# Patient Record
Sex: FEMALE | Race: WHITE | NOT HISPANIC OR LATINO | Employment: OTHER | URBAN - METROPOLITAN AREA
[De-identification: names, ages, dates, MRNs, and addresses within clinical notes are randomized per-mention and may not be internally consistent; named-entity substitution may affect disease eponyms.]

---

## 2017-01-30 ENCOUNTER — OFFICE VISIT (OUTPATIENT)
Dept: NEUROLOGY | Facility: CLINIC | Age: 72
End: 2017-01-30
Payer: MEDICARE

## 2017-01-30 VITALS
RESPIRATION RATE: 18 BRPM | DIASTOLIC BLOOD PRESSURE: 66 MMHG | BODY MASS INDEX: 29.87 KG/M2 | SYSTOLIC BLOOD PRESSURE: 112 MMHG | HEIGHT: 66 IN | WEIGHT: 185.88 LBS | HEART RATE: 76 BPM

## 2017-01-30 DIAGNOSIS — G56.03 CARPAL TUNNEL SYNDROME, BILATERAL: ICD-10-CM

## 2017-01-30 DIAGNOSIS — G24.3 CERVICAL DYSTONIA: Primary | ICD-10-CM

## 2017-01-30 DIAGNOSIS — M48.061 LUMBAR STENOSIS: ICD-10-CM

## 2017-01-30 PROCEDURE — 99999 PR PBB SHADOW E&M-EST. PATIENT-LVL III: CPT | Mod: PBBFAC,,, | Performed by: PSYCHIATRY & NEUROLOGY

## 2017-01-30 PROCEDURE — 99214 OFFICE O/P EST MOD 30 MIN: CPT | Mod: S$PBB,,, | Performed by: PSYCHIATRY & NEUROLOGY

## 2017-01-30 PROCEDURE — 99213 OFFICE O/P EST LOW 20 MIN: CPT | Mod: PBBFAC | Performed by: PSYCHIATRY & NEUROLOGY

## 2017-01-30 NOTE — PROGRESS NOTES
HPI: Evy Mendoza is a 71 y.o. female with history of Bilateral CTS prior s/p remote right CTR as well as history of cervical spinal meningioma removal 4 years ago after presenting with numb hands. Now with pain and numbness in the last 3 digits of the left hands (improved). MRI Cervical spine 2014 showed prior fusion and disc bulging and NF foraminal narrowing. EMG 2014 showed Mild Bilateral CTS, No ulnar neuropathy, reduced CMAP from likely prior cervical myelopathy  Now with increased neck pain and lumbar radicular pain.   NOTE CERVICAL DYSTONIA ON EXAM  Since the last visit with Botox in 11/2016, she had good response with Botox. She had this over 2 months ago. She had more response to the medication than first dose.She has a significant improvement in her pain and symptoms return closer to botox.   She is not having any facial pain.She states she has remote history of TN and had remote brain scan for this.   Currently being treated for URI/sinus infection.   Her lumbar pain depends on her activities.   This responds to rest  CTS symptoms are not very bothersome  Review of Systems   Constitutional: Negative for fever.   Eyes: Negative for double vision.   Respiratory: Negative for hemoptysis.    Cardiovascular: Negative for leg swelling.   Gastrointestinal: Negative for blood in stool.   Genitourinary: Negative for hematuria.   Musculoskeletal: Positive for back pain and neck pain.   Skin: Negative for rash.   Neurological: Negative for sensory change and headaches.   Psychiatric/Behavioral: The patient does not have insomnia.            Exam:  Gen Appearance, well developed/nourished in no apparent distress  CV: 2+ distal pulses with no edema or swelling    Neuro:  MS: Awake, alert, Sustains attention. Recent/remote memory intact, Language is full to spontaneous speech/comprehension. Fund of Knowledge is full  CN: Optic discs are flat with normal vasculature, PERRL, Extraoccular movements and visual  fields are full. Normal facial sensation and strength, Hearing symmetric, Tongue and Palate are midline and strong. Shoulder Shrug symmetric and strong.  Motor: Normal bulk, tone, no abnormal movements. 5/5 strength bilateral upper/lower extremities with 2+ reflexes and no clonus  Sensory: symmetric to  temp, and vibration.  Romberg negative  Cerebellar: Finger-nose intact  Gait: Normal stance, no ataxia  There is clear right more than left levator and trapezius muscle spasm. Neck is right lateral and vasu-flex as prior        EMG 2014: 1. Mild Bilateral Carpal Tunnel Syndrome, worse on the left  2. NO evidence of ulnar neuropathy on this study  3. Reduced CMAP in the arms and not legs suggesting this is from   from prior cervical myelopathy and not polyneuropathy    8/2016 MRI C spine: Posterior decompression with fusion of the C3-C6 levels.  There are moderate changes of the cervical spine, most pronounced at C6-7, without central canal stenosis or neural foraminal narrowing.    No abnormal cord edema or postcontrast enhancement.    8/2016 MRI L spine:   Slight retrolisthesis of L2 on L3 and L3 on L4 by approximately 4 mm.    Multilevel degenerative changes of the lumbar spine contributing to central canal stenosis and neural foraminal narrowing as detailed in the above level by level description.    Assessment/Plan: Evy Mendoza is a 71 y.o. female with history of Bilateral CTS prior s/p remote right CTR as well as history of cervical spinal meningioma removal 4 years ago after presenting with numb hands. Now with pain and numbness in the last 3 digits of the left hands (improved). MRI Cervical spine 2014 showed prior fusion and disc bulging and NF foraminal narrowing. EMG 2014 showed Mild Bilateral CTS, No ulnar neuropathy, reduced CMAP from likely prior cervical myelopathy  Now with increased neck pain and lumbar radicular pain.   NOTE CERVICAL DYSTONIA ON EXAM    I recommend:     1. Patient followed  up with Dr Block in 2014 and was discharged as she opted against spine surgery.   2. MRI  C spine 8/2016: no meningioma/ some degenerative changes without spinal stenosis. MRI L spine 8/2016: Moderate central canal stenosis. No further imaging needed at this time.   3.  Botox for cervical dystonia is starting to help: 200 units/ continue q 3 months for now/ watch for continued improvement.   4.  She is not interested in further treatment of Lumbar or cervical disc disease at this time.   5. CTS symptoms are not very active currently  6. PCP managing Trigeminal Neuralgia (she states she had brain imaging for this with Dr Joy years ago.) Tegretol helps  -- will monitor. This did not improve with Botox      RTC for Botox and continue q 3 months

## 2017-01-30 NOTE — MR AVS SNAPSHOT
[facility]  [address] [facility] [address]  Phone: [phone]  Fax: [phone]                  Evy Mendoza   2017 2:15 PM   Office Visit    Description:  Female : 1945   Provider:  Terry Clinton MD   Department:  [facility]           Reason for Visit     Neurologic Problem           Diagnoses this Visit        Comments    Cervical dystonia    -  Primary     Lumbar stenosis         Carpal tunnel syndrome, bilateral                To Do List           Goals (5 Years of Data)     None      Follow-Up and Disposition     Return for botox .      [facility]     [facility] -  Assistance  Registered nurses in the [facility] provide clinical advisement, health education, appointment booking, and other advisory services.  Call for this free service at [phone].             Medications           Message regarding Medications     Verify the changes and/or additions to your medication regime listed below are the same as discussed with your clinician today.  If any of these changes or additions are incorrect, please notify your healthcare provider.        These medications were administered today        Dose Freq    onabotulinumtoxina injection 200 Units 200 Units Once    Sig: Inject 200 Units into the muscle once.    Class: Normal    Route: Intramuscular      STOP taking these medications     back brace Misc 1 Device by Misc.(Non-Drug; Combo Route) route daily as needed.           Verify that the below list of medications is an accurate representation of the medications you are currently taking.  If none reported, the list may be blank. If incorrect, please contact your healthcare provider. Carry this list with you in case of emergency.           Current Medications     ACETAMINOPHEN (TYLENOL 8 HOUR ORAL) Take 1 tablet by mouth daily as needed.    alendronate (FOSAMAX) 70 MG tablet Take 1 tablet (70  "mg total) by mouth every 7 days.    calcium-vitamin D3 (CALCIUM 500 + D) 500 mg(1,250mg) -200 unit per tablet Take 1 tablet by mouth 2 (two) times daily with meals.    carbamazepine (TEGRETOL) 200 mg tablet Take 200 mg by mouth 2 (two) times daily.     cetirizine (ZYRTEC) 10 MG tablet Take 1 tablet by mouth continuous prn.     cyanocobalamin 500 MCG tablet Take 500 mcg by mouth once daily.    docusate sodium (COLACE) 100 MG capsule Take 100 mg by mouth once daily.    estrogen, conjugated,-medroxyprogesterone 0.3-1.5 mg (PREMPRO) 0.3-1.5 mg per tablet Take 1 tablet by mouth once daily.    fish oil-omega-3 fatty acids 300-1,000 mg capsule Take 1 g by mouth 3 (three) times daily.    fluticasone (FLONASE) 50 mcg/actuation nasal spray 1 spray by Nasal route continuous prn.     glucosamine-chondroitin 500-400 mg tablet Take 1 tablet by mouth 3 (three) times daily.    losartan-hydrochlorothiazide 50-12.5 mg (HYZAAR) 50-12.5 mg per tablet Take 1 tablet by mouth once daily.    metoprolol succinate (TOPROL-XL) 25 MG 24 hr tablet Take 25 mg by mouth once daily.    multivitamin (ONE DAILY MULTIVITAMIN) per tablet Take 1 tablet by mouth once daily.    olopatadine (PATADAY) 0.2 % Drop Place 1 drop into both eyes every evening.    pentosan polysulfate (ELMIRON) 100 mg capsule Take 1 tablet by mouth 3 (three) times daily.     rabeprazole (ACIPHEX) 20 mg tablet Take 20 mg by mouth once daily.    tramadol 100 mg TM24 Take 100 mg by mouth 2 (two) times daily as needed.     VYTORIN 10-20 10-20 mg per tablet Take 1 tablet by mouth once daily.           Clinical Reference Information           Vital Signs - Last Recorded  Most recent update: 1/30/2017  2:29 PM by Geno Cordova MA    BP Pulse Resp Ht Wt BMI    112/66 (BP Location: Right arm, Patient Position: Sitting, BP Method: Manual) 76 18 5' 6" (1.676 m) 84.3 kg (185 lb 13.6 oz) 30 kg/m2      Blood Pressure          Most Recent Value    BP  112/66      Allergies as of 1/30/2017  "    No Known Drug Allergies      Immunizations Administered on Date of Encounter - 1/30/2017     None      Orders Placed During Today's Visit      Normal Orders This Visit    Prior Authorization Order

## 2017-02-10 ENCOUNTER — PROCEDURE VISIT (OUTPATIENT)
Dept: NEUROLOGY | Facility: CLINIC | Age: 72
End: 2017-02-10
Payer: MEDICARE

## 2017-02-10 DIAGNOSIS — G24.3 CERVICAL DYSTONIA: ICD-10-CM

## 2017-02-10 PROCEDURE — 64616 CHEMODENERV MUSC NECK DYSTON: CPT | Mod: 50,S$PBB,, | Performed by: PSYCHIATRY & NEUROLOGY

## 2017-02-10 PROCEDURE — 64616 CHEMODENERV MUSC NECK DYSTON: CPT | Mod: PBBFAC | Performed by: PSYCHIATRY & NEUROLOGY

## 2017-02-10 RX ADMIN — ONABOTULINUMTOXINA 200 UNITS: 100 INJECTION, POWDER, LYOPHILIZED, FOR SOLUTION INTRADERMAL; INTRAMUSCULAR at 03:02

## 2017-02-10 NOTE — PROCEDURES
Procedures   BOTOX PROCEDURE NOTE     Date of Procedure: 2/10/17    Reason for Proceedure: Cervical dystonia      Informed consent was obtained prior to performing this study. Two patient identifiers were confirmed with the patient prior to performing this study. A time out to determine correct patient and and agreement on procedure performed was conducted prior to the injections.     Procedure Details: After informed consent obtained the patient's head and upper neck was cleansed with alcohol rub and 200 Units of Botox (diluted 1:1) was injected in the following bilateral muscles:   40 units in each Trapezius*  20 units in each Levator Scapulae*  10 units in each Splenius Capitus*  10 units in each Longissimus*  10 units in each Spenius Cervices*  10 units in each Semispinalis Capitus*    *= denotes bilateral injections    The patient tolerated the procedure well with less than 0.25cc of blood loss. She was observed for several minutes post injection and given a handout from [employer] regarding when and where to seek help if side effects are experienced.  RTC in 12 weeks are more for more Botox

## 2017-05-05 ENCOUNTER — PROCEDURE VISIT (OUTPATIENT)
Dept: NEUROLOGY | Facility: CLINIC | Age: 72
End: 2017-05-05
Payer: MEDICARE

## 2017-05-05 DIAGNOSIS — G24.3 CERVICAL DYSTONIA: Primary | ICD-10-CM

## 2017-05-05 PROCEDURE — 64616 CHEMODENERV MUSC NECK DYSTON: CPT | Mod: 50,S$PBB | Performed by: PSYCHIATRY & NEUROLOGY

## 2017-05-05 RX ORDER — LIDOCAINE AND PRILOCAINE 25; 25 MG/G; MG/G
CREAM TOPICAL 2 TIMES DAILY PRN
Qty: 30 G | Refills: 3 | Status: SHIPPED | OUTPATIENT
Start: 2017-05-05 | End: 2018-10-05

## 2017-05-05 RX ADMIN — ONABOTULINUMTOXINA 200 UNITS: 100 INJECTION, POWDER, LYOPHILIZED, FOR SOLUTION INTRADERMAL; INTRAMUSCULAR at 02:05

## 2017-05-05 NOTE — PROCEDURES
Procedures   BOTOX PROCEDURE NOTE     Date of Procedure:5/5/17    Reason for Proceedure: Cervical dystonia       Informed consent was obtained prior to performing this study. Two patient identifiers were confirmed with the patient prior to performing this study. A time out to determine correct patient and and agreement on procedure performed was conducted prior to the injections.     Procedure Details: After informed consent obtained the patient's head and upper neck was cleansed with alcohol rub and 200 Units of Botox (diluted 1:1) was injected in the following bilateral muscles:   40 units in each Trapezius*  20 units in each Levator Scapulae*  10 units in each Splenius Capitus*  10 units in each Longissimus*  10 units in each Spenius Cervices*  10 units in each Semispinalis Capitus*    *= denotes bilateral injections    The patient tolerated the procedure well with less than 0.25cc of blood loss. She was observed for several minutes post injection and given a handout from [employer] regarding when and where to seek help if side effects are experienced.  RTC in 12 weeks are more for more Botox as this helps her neck pain greatly for at least 10 weeks  Discussed if she is not improving more after the next injection, can consider increased Botox dose. She is not interested at this time.   She has relief with lidocaine for back pain: apply lidocaine/prilocaine cream to the back (quater size amount) bid PRN as prescribed unless rash

## 2017-05-30 ENCOUNTER — OFFICE VISIT (OUTPATIENT)
Dept: NEUROLOGY | Facility: CLINIC | Age: 72
End: 2017-05-30
Payer: MEDICARE

## 2017-05-30 VITALS
HEART RATE: 74 BPM | HEIGHT: 66 IN | DIASTOLIC BLOOD PRESSURE: 88 MMHG | RESPIRATION RATE: 14 BRPM | WEIGHT: 186.94 LBS | BODY MASS INDEX: 30.04 KG/M2 | SYSTOLIC BLOOD PRESSURE: 120 MMHG

## 2017-05-30 DIAGNOSIS — M48.061 LUMBAR STENOSIS: ICD-10-CM

## 2017-05-30 DIAGNOSIS — M54.12 CERVICAL RADICULAR PAIN: ICD-10-CM

## 2017-05-30 DIAGNOSIS — M54.81 OCCIPITAL NEURALGIA OF LEFT SIDE: ICD-10-CM

## 2017-05-30 DIAGNOSIS — G24.3 CERVICAL DYSTONIA: Primary | ICD-10-CM

## 2017-05-30 PROCEDURE — 1159F MED LIST DOCD IN RCRD: CPT | Performed by: PSYCHIATRY & NEUROLOGY

## 2017-05-30 PROCEDURE — 99214 OFFICE O/P EST MOD 30 MIN: CPT | Mod: S$PBB | Performed by: PSYCHIATRY & NEUROLOGY

## 2017-05-30 PROCEDURE — 99999 PR PBB SHADOW E&M-EST. PATIENT-LVL IV: CPT | Mod: PBBFAC,,, | Performed by: PSYCHIATRY & NEUROLOGY

## 2017-05-30 PROCEDURE — 1125F AMNT PAIN NOTED PAIN PRSNT: CPT | Performed by: PSYCHIATRY & NEUROLOGY

## 2017-05-30 PROCEDURE — 99214 OFFICE O/P EST MOD 30 MIN: CPT | Mod: PBBFAC | Performed by: PSYCHIATRY & NEUROLOGY

## 2017-05-30 NOTE — PROGRESS NOTES
"HPI:Evy Menodza is a 72 y.o. female with history of Bilateral CTS prior s/p remote right CTR as well as history of cervical spinal meningioma removal 4 years ago after presenting with numb hands. Now with pain and numbness in the last 3 digits of the left hands (improved). MRI Cervical spine 2014 showed prior fusion and disc bulging and NF foraminal narrowing. EMG 2014 showed Mild Bilateral CTS, No ulnar neuropathy, reduced CMAP from likely prior cervical myelopathy  Now with increased neck pain and lumbar radicular pain.   NOTE CERVICAL DYSTONIA ON EXAM    Patient is here today to discuss her long standing symptoms previously  believed to be Trigeminal Neuralgia. For the past 10 years she has felt "a different feeling in the left head" and a crawling feeling in the left ear. This has responded to Tegretol for years. Lately, she has had more pressure in the head. This occurs in the left head in a waylon's like distribution from the back of the head at the occiput to the frontal region. There is no reproduction of the pain with chewing or touching. There was never and shocking pain and there was constant strange sensation.   She states she has bad knees which causes her to sway to one side.  Her last Botox treatment did not seem to help as well as the others.   She is pending consultation with Dr ADAM Liang to discuss intervention    Review of Systems   Constitutional: Negative for fever.   Eyes: Negative for double vision.   Respiratory: Negative for hemoptysis.    Cardiovascular: Negative for leg swelling.   Gastrointestinal: Negative for blood in stool.   Genitourinary: Negative for hematuria.   Musculoskeletal: Positive for back pain and neck pain.   Skin: Negative for rash.   Neurological: Negative for sensory change and headaches.   Psychiatric/Behavioral: The patient does not have insomnia.            Exam:  Gen Appearance, well developed/nourished in no apparent distress  CV: 2+ distal pulses with no " edema or swelling    Neuro:  MS: Awake, alert, Sustains attention. Recent/remote memory intact, Language is full to spontaneous speech/comprehension. Fund of Knowledge is full  CN: Optic discs are flat with normal vasculature, PERRL, Extraoccular movements and visual fields are full. Normal facial sensation and strength, Hearing symmetric, Tongue and Palate are midline and strong. Shoulder Shrug symmetric and strong.  Motor: Normal bulk, tone, no abnormal movements. 5/5 strength bilateral upper/lower extremities with 2+ reflexes and no clonus  Sensory: symmetric to  temp, and vibration.  Romberg negative  Cerebellar: Finger-nose intact  Gait: Normal stance, no ataxia  There is clear right more than left levator and trapezius muscle spasm. Neck is right lateral and vasu-flex as prior which is improved but not resolved post botox  Some pain at the left occiput on palpation      EMG 2014: 1. Mild Bilateral Carpal Tunnel Syndrome, worse on the left  2. NO evidence of ulnar neuropathy on this study  3. Reduced CMAP in the arms and not legs suggesting this is from   from prior cervical myelopathy and not polyneuropathy    8/2016 MRI C spine: Posterior decompression with fusion of the C3-C6 levels.  There are moderate changes of the cervical spine, most pronounced at C6-7, without central canal stenosis or neural foraminal narrowing.    No abnormal cord edema or postcontrast enhancement.    8/2016 MRI L spine:   Slight retrolisthesis of L2 on L3 and L3 on L4 by approximately 4 mm.    Multilevel degenerative changes of the lumbar spine contributing to central canal stenosis and neural foraminal narrowing as detailed in the above level by level description.    Assessment/Plan: Evy Mendoza is a 72 y.o. female with history of Bilateral CTS prior s/p remote right CTR as well as history of cervical spinal meningioma removal 4 years ago after presenting with numb hands. Now with pain and numbness in the last 3 digits  "of the left hands (improved). MRI Cervical spine 2014 showed prior fusion and disc bulging and NF foraminal narrowing. EMG 2014 showed Mild Bilateral CTS, No ulnar neuropathy, reduced CMAP from likely prior cervical myelopathy  Now with increased neck pain and lumbar radicular pain.   NOTE CERVICAL DYSTONIA ON EXAM    I recommend:     1. Patient followed up with Dr Block in 2014 and was discharged as she opted against spine surgery.   2. MRI  C spine 8/2016: no meningioma/ some degenerative changes without spinal stenosis. MRI L spine 8/2016: Moderate central canal stenosis. No further imaging needed at this time.   3.  Botox for cervical dystonia is starting to help but did not help as well at last injection: Increase to 300 units/ continue q 3 months for now/ watch for continued improvement.   4.  She will see Dr ADAM Liang about  Lumbar or cervical disc disease at this time- to discuss intervention  5. CTS symptoms are not very active currently  6. PCP managing "Trigeminal Neuralgia" (she states she had brain imaging for this with Dr Joy years ago.) Tegretol was helping. This, upon further review sounds like left occipital neuralgia.  Refer to pain management to consider ON blocks- she is pending appointment with Dr ADAM Liang regarding her spinal pain and will review with him. Consider tapering Tegretol if this helps.       RTC for Botox     CC: Dr ADAM Liang      "

## 2017-08-04 ENCOUNTER — PROCEDURE VISIT (OUTPATIENT)
Dept: NEUROLOGY | Facility: CLINIC | Age: 72
End: 2017-08-04
Payer: MEDICARE

## 2017-08-04 DIAGNOSIS — G24.3 CERVICAL DYSTONIA: ICD-10-CM

## 2017-08-04 PROCEDURE — 99999 PR STA SHADOW: CPT | Mod: PBBFAC,,, | Performed by: PSYCHIATRY & NEUROLOGY

## 2017-08-04 PROCEDURE — 64616 CHEMODENERV MUSC NECK DYSTON: CPT | Mod: 50,S$PBB | Performed by: PSYCHIATRY & NEUROLOGY

## 2017-08-04 RX ADMIN — ONABOTULINUMTOXINA 300 UNITS: 100 INJECTION, POWDER, LYOPHILIZED, FOR SOLUTION INTRADERMAL; INTRAMUSCULAR at 01:08

## 2017-08-04 NOTE — PROCEDURES
Procedures     BOTOX PROCEDURE NOTE     Date of Procedure:8/4/17    Reason for Proceedure: Cervical dystonia       Informed consent was obtained prior to performing this study. Two patient identifiers were confirmed with the patient prior to performing this study. A time out to determine correct patient and and agreement on procedure performed was conducted prior to the injections.     Procedure Details: After informed consent obtained the patient's head and upper neck was cleansed with alcohol rub and 300 Units of Botox (diluted 1:1) was injected in the following bilateral muscles:   50 units in each Trapezius*  40 units in each Levator Scapulae*  20 units in each Splenius Capitus*  20 units in each Longissimus*  10 units in each Spenius Cervices*  10 units in each Semispinalis Capitus*    *= denotes bilateral injections    The patient tolerated the procedure well with less than 0.25cc of blood loss. She was observed for several minutes post injection and given a handout from [employer] regarding when and where to seek help if side effects are experienced.  RTC in 3 months for more Botox. She will notify me if any changes in symptoms which would prompt different or even less frequent treatment prior.

## 2017-08-08 ENCOUNTER — TELEPHONE (OUTPATIENT)
Dept: OBSTETRICS AND GYNECOLOGY | Facility: CLINIC | Age: 72
End: 2017-08-08

## 2017-08-08 DIAGNOSIS — Z12.31 ENCOUNTER FOR SCREENING MAMMOGRAM FOR BREAST CANCER: Primary | ICD-10-CM

## 2017-08-08 NOTE — TELEPHONE ENCOUNTER
----- Message from Yodit Solis MA sent at 8/8/2017  9:27 AM CDT -----  Contact: self  Evy Mendoza  MRN: [id number]  Home Phone      [phone]  Work Phone      Not on file.  Mobile          Not on file.    Patient Care Team:  Rakesh Devine MD as PCP - General (Internal Medicine)  OB? No  What phone number can you be reached at?   [phone]  Message:  Please link mammo orders to appt 10/03/17.  Thanks!

## 2017-09-06 DIAGNOSIS — M81.0 OSTEOPOROSIS: ICD-10-CM

## 2017-09-06 RX ORDER — ALENDRONATE SODIUM 70 MG/1
TABLET ORAL
Qty: 12 TABLET | Refills: 0 | Status: SHIPPED | OUTPATIENT
Start: 2017-09-06 | End: 2017-10-03 | Stop reason: SDUPTHER

## 2017-09-06 NOTE — TELEPHONE ENCOUNTER
Evy desire refill of Fosamax. Annual exam scheduled.   Patient Active Problem List   Diagnosis    Osteoporosis     Prior to Admission medications    Medication Sig Start Date End Date Taking? Authorizing Provider   ACETAMINOPHEN (TYLENOL 8 HOUR ORAL) Take 1 tablet by mouth daily as needed.    Historical Provider, MD   alendronate (FOSAMAX) 70 MG tablet Take 1 tablet (70 mg total) by mouth every 7 days. 9/28/16   Marisa Mcdowell MD   calcium-vitamin D3 (CALCIUM 500 + D) 500 mg(1,250mg) -200 unit per tablet Take 1 tablet by mouth once daily at 6am.     Historical Provider, MD   carbamazepine (TEGRETOL) 200 mg tablet Take 200 mg by mouth 2 (two) times daily.     Historical Provider, MD   cetirizine (ZYRTEC) 10 MG tablet Take 1 tablet by mouth continuous prn.  12/15/11   Historical Provider, MD   cyanocobalamin 500 MCG tablet Take 500 mcg by mouth once daily.    Historical Provider, MD   docusate sodium (COLACE) 100 MG capsule Take 100 mg by mouth once daily.    Historical Provider, MD   estrogen, conjugated,-medroxyprogesterone 0.3-1.5 mg (PREMPRO) 0.3-1.5 mg per tablet Take 1 tablet by mouth once daily. 9/28/16 5/30/17  Marisa Mcdowell MD   fish oil-omega-3 fatty acids 300-1,000 mg capsule Take 1 g by mouth once daily.     Historical Provider, MD   fluticasone (FLONASE) 50 mcg/actuation nasal spray 1 spray by Nasal route continuous prn.  12/15/11   Historical Provider, MD   glucosamine-chondroitin 500-400 mg tablet Take 1 tablet by mouth once daily at 6am.     Historical Provider, MD   lidocaine-prilocaine (EMLA) cream Apply topically 2 (two) times daily as needed. Apply to painful areas of skin 5/5/17   Terry Clinton MD   losartan-hydrochlorothiazide 50-12.5 mg (HYZAAR) 50-12.5 mg per tablet Take 1 tablet by mouth once daily. 7/10/15   Historical Provider, MD   metoprolol succinate (TOPROL-XL) 25 MG 24 hr tablet Take 25 mg by mouth once daily.    Historical Provider, MD   multivitamin (ONE  DAILY MULTIVITAMIN) per tablet Take 1 tablet by mouth once daily.    Historical Provider, MD   olopatadine (PATADAY) 0.2 % Drop Place 1 drop into both eyes every evening.    Historical Provider, MD   pentosan polysulfate (ELMIRON) 100 mg capsule Take 1 tablet by mouth 3 (three) times daily.  12/15/11   Historical Provider, MD   rabeprazole (ACIPHEX) 20 mg tablet Take 20 mg by mouth once daily. 9/8/15   Historical Provider, MD   tramadol 100 mg TM24 Take 100 mg by mouth 2 (two) times daily as needed.     Historical Provider, MD   VYTORIN 10-20 10-20 mg per tablet Take 1 tablet by mouth once daily. 9/8/15   Historical Provider, MD

## 2017-10-03 ENCOUNTER — OFFICE VISIT (OUTPATIENT)
Dept: OBSTETRICS AND GYNECOLOGY | Facility: CLINIC | Age: 72
End: 2017-10-03
Payer: MEDICARE

## 2017-10-03 ENCOUNTER — HOSPITAL ENCOUNTER (OUTPATIENT)
Dept: RADIOLOGY | Facility: HOSPITAL | Age: 72
Discharge: HOME | End: 2017-10-03
Attending: OBSTETRICS & GYNECOLOGY
Payer: MEDICARE

## 2017-10-03 VITALS
HEART RATE: 88 BPM | WEIGHT: 186 LBS | HEIGHT: 66 IN | SYSTOLIC BLOOD PRESSURE: 122 MMHG | DIASTOLIC BLOOD PRESSURE: 84 MMHG | BODY MASS INDEX: 29.89 KG/M2 | RESPIRATION RATE: 10 BRPM | HEIGHT: 66 IN | BODY MASS INDEX: 29.89 KG/M2 | WEIGHT: 186 LBS

## 2017-10-03 DIAGNOSIS — Z78.0 MENOPAUSE: ICD-10-CM

## 2017-10-03 DIAGNOSIS — Z12.31 ENCOUNTER FOR SCREENING MAMMOGRAM FOR BREAST CANCER: ICD-10-CM

## 2017-10-03 DIAGNOSIS — Z01.419 WELL WOMAN EXAM WITH ROUTINE GYNECOLOGICAL EXAM: Primary | ICD-10-CM

## 2017-10-03 DIAGNOSIS — M81.0 AGE-RELATED OSTEOPOROSIS WITHOUT CURRENT PATHOLOGICAL FRACTURE: ICD-10-CM

## 2017-10-03 DIAGNOSIS — N81.11 CYSTOCELE, MIDLINE: ICD-10-CM

## 2017-10-03 DIAGNOSIS — Z79.890 HORMONE REPLACEMENT THERAPY (HRT): ICD-10-CM

## 2017-10-03 PROCEDURE — 77063 BREAST TOMOSYNTHESIS BI: CPT | Mod: 26,,, | Performed by: RADIOLOGY

## 2017-10-03 PROCEDURE — 99999 PR STA SHADOW: CPT | Mod: PBBFAC,,, | Performed by: OBSTETRICS & GYNECOLOGY

## 2017-10-03 PROCEDURE — 77067 SCR MAMMO BI INCL CAD: CPT | Mod: 26,,, | Performed by: RADIOLOGY

## 2017-10-03 PROCEDURE — 99999 PR PBB SHADOW E&M-EST. PATIENT-LVL IV: CPT | Mod: PBBFAC,,, | Performed by: OBSTETRICS & GYNECOLOGY

## 2017-10-03 PROCEDURE — 77067 SCR MAMMO BI INCL CAD: CPT | Mod: TC

## 2017-10-03 PROCEDURE — G0101 CA SCREEN;PELVIC/BREAST EXAM: HCPCS | Mod: S$PBB | Performed by: OBSTETRICS & GYNECOLOGY

## 2017-10-03 PROCEDURE — 99214 OFFICE O/P EST MOD 30 MIN: CPT | Mod: PBBFAC,25 | Performed by: OBSTETRICS & GYNECOLOGY

## 2017-10-03 RX ORDER — TRAMADOL HYDROCHLORIDE 50 MG/1
50 TABLET ORAL 2 TIMES DAILY
Refills: 1 | COMMUNITY
Start: 2017-08-01 | End: 2018-04-09 | Stop reason: SDUPTHER

## 2017-10-03 RX ORDER — ALENDRONATE SODIUM 70 MG/1
70 TABLET ORAL
Qty: 12 TABLET | Refills: 3 | Status: SHIPPED | OUTPATIENT
Start: 2017-10-03 | End: 2018-10-05 | Stop reason: SDUPTHER

## 2017-10-03 RX ORDER — CONJUGATED ESTROGENS AND MEDROXYPROGESTERONE ACETATE .3; 1.5 MG/1; MG/1
1 TABLET, SUGAR COATED ORAL DAILY
Qty: 90 TABLET | Refills: 3 | Status: SHIPPED | OUTPATIENT
Start: 2017-10-03 | End: 2018-09-01 | Stop reason: SDUPTHER

## 2017-10-03 RX ORDER — METOPROLOL SUCCINATE 50 MG/1
25 TABLET, EXTENDED RELEASE ORAL DAILY
Refills: 5 | COMMUNITY
Start: 2017-07-31 | End: 2017-11-09

## 2017-10-03 RX ORDER — CONJUGATED ESTROGENS AND MEDROXYPROGESTERONE ACETATE .3; 1.5 MG/1; MG/1
1 TABLET, SUGAR COATED ORAL DAILY
Refills: 3 | COMMUNITY
Start: 2017-07-31 | End: 2017-10-03 | Stop reason: SDUPTHER

## 2017-10-03 RX ORDER — TRIAMCINOLONE ACETONIDE 1 MG/G
CREAM TOPICAL
Refills: 0 | COMMUNITY
Start: 2017-06-26 | End: 2017-10-03

## 2017-10-03 NOTE — PROGRESS NOTES
Subjective:    Patient ID: Evy Mendoza is a 72 y.o. y.o. female.     Chief Complaint: Annual Well Woman Exam     History of Present Illness:  Evy presents today for Annual Well Woman exam. She describes her menses as absent in menopause.She denies pelvic pain.  She denies breast tenderness, masses, nipple discharge. She denies difficulty with urination or bowel movements. She denies menopausal symptoms such as hotflashes, vaginal dryness, and night sweats. She denies bloating, early satiety, or weight changes. She is sexually active. Contraception is by no method.    Patient reports she has noticed recently lower abdominal pressure, low back pain, and vaginal bulge. She is concerned about prolapse.       Menstrual History:   No LMP recorded. Patient is postmenopausal..     OB History      Para Term  AB Living    2 2 2     2    SAB TAB Ectopic Multiple Live Births            2          The following portions of the patient's history were reviewed and updated as appropriate: allergies, current medications, past family history, past medical history, past social history, past surgical history and problem list.    ROS:   CONSTITUTIONAL: Negative for fever, chills, diaphoresis, weakness, fatigue, weight loss, weight gain  ENT: nasal congestion, nasal discharge  EYES: negative for blurry vision, decreased vision, loss of vision, eye pain, diplopia, photophobia, discharge  SKIN: Negative for rash, itching, hives  RESPIRATORY: negative for cough, hemoptysis, shortness of breath, pleuritic chest pain, wheezing  CARDIOVASCULAR: negative for chest pain, dyspnea on exertion, orthopnea, paroxysmal nocturnal dyspnea, edema, palpitations  BREAST: negative for breast  tenderness, breast mass, nipple discharge, or skin changes  GASTROINTESTINAL: constipation  GENITOURINARY: negative for abnormal vaginal bleeding, amenorrhea, decreased libido, dysuria, genital sores, hematuria, incontinence, menorrhagia,  pelvic pain, urinary frequency, vaginal discharge  HEMATOLOGIC/LYMPHATIC: negative for swollen lymph nodes, bleeding, bruising  MUSCULOSKELETAL: back pain, joint pain, muscle pain  NEUROLOGICAL: negative for dizzy/vertigo, headache, focal weakness, numbness/tingling, speech problems, loss of consciousness, confusion, memory loss  BEHAVORIAL/PSYCH: negative for anxiety, depression, psychosis  ENDOCRINE: negative for polydipsia/polyuria, palpitations, skin changes, temperature intolerance, unexpected weight changes  ALLERGIC/IMMUNOLOGIC: negative for urticaria, hay fever, angioedema      Objective:    Vital Signs:  Vitals:    10/03/17 1431   BP: 122/84   Pulse: 88   Resp: 10       Physical Exam:  General:  alert,normal appearing gravid female   Skin:  Skin color, texture, turgor normal. No rashes or lesions   HEENT:  conjunctivae/corneas clear. PERRL.   Neck: supple, trachea midline, no adenopathy or thyromegally   Respiratory:  clear to auscultation bilaterally   Heart:  regular rate and rhythm, S1, S2 normal, no murmur, click, rub or gallop   Breasts:  no discharge, erythema, or tenderness   Abdomen:  soft, non-tender; bowel sounds normal   Pelvis: External genitalia: normal general appearance  Urinary system: urethral meatus normal, bladder nontender  Vaginal: atrophic mucosa, cystocele present, stage 3  Cervix: normal appearance  Uterus: normal size, shape, position  Adnexa: normal size, nontender bilaterally   Extremities: Normal ROM; no edema, no cyanosis   Neurologial: Normal strength and tone. No focal numbness or weakness. Reflexes 2+ and equal.   Psychiatric: normal mood, speech, dress, and thought processes         Assessment:       Healthy female exam.     1. Well woman exam with routine gynecological exam    2. Age-related osteoporosis without current pathological fracture    3. Hormone replacement therapy (HRT)    4. Menopause    5. Cystocele, midline          Plan:      MMG preformed today; follow  up  DEXA preformed last year; revealed osteopenia with an elevated FRAX score  Colonoscopy up to date  Discussed weight bearing exercise, calcium, and vitamin d for osteoporosis prevention  Refill on Prempro  Refill on fosamax  Discussed cystocele findings and treatment with pessary; will schedule  RTC in 1 year    COUNSELING:  Evy was counseled on STD pevention, use and side-effects of various contraceptive measures, A.C.O.G. Pap guidelines and recommendations for yearly pelvic exams in addition to recommendations for monthly self breast exams; to see her PCP for other health maintenance.

## 2017-11-03 ENCOUNTER — PROCEDURE VISIT (OUTPATIENT)
Dept: NEUROLOGY | Facility: CLINIC | Age: 72
End: 2017-11-03
Payer: MEDICARE

## 2017-11-03 DIAGNOSIS — G24.3 CERVICAL DYSTONIA: Primary | ICD-10-CM

## 2017-11-03 PROCEDURE — 99999 PR STA SHADOW: CPT | Mod: PBBFAC,,, | Performed by: PSYCHIATRY & NEUROLOGY

## 2017-11-03 PROCEDURE — 64616 CHEMODENERV MUSC NECK DYSTON: CPT | Mod: 50,S$PBB | Performed by: PSYCHIATRY & NEUROLOGY

## 2017-11-03 RX ADMIN — ONABOTULINUMTOXINA 300 UNITS: 100 INJECTION, POWDER, LYOPHILIZED, FOR SOLUTION INTRADERMAL; INTRAMUSCULAR at 01:11

## 2017-11-03 NOTE — PROGRESS NOTES
BOTOX PROCEDURE NOTE     Date of Procedure:1/3/17    Reason for Proceedure: Cervical dystonia       Informed consent was obtained prior to performing this study. Two patient identifiers were confirmed with the patient prior to performing this study. A time out to determine correct patient and and agreement on procedure performed was conducted prior to the injections.     Procedure Details: After informed consent obtained the patient's head and upper neck was cleansed with alcohol rub and 300 Units of Botox (diluted 1:1) was injected in the following bilateral muscles:   50 units in each Trapezius*  40 units in each Levator Scapulae*  20 units in each Splenius Capitus*  20 units in each Longissimus*  10 units in each Spenius Cervices*  10 units in each Semispinalis Capitus*    *= denotes bilateral injections    The patient tolerated the procedure well with less than 0.25cc of blood loss. She was observed for several minutes post injection and given a handout from [employer] regarding when and where to seek help if side effects are experienced.  RTC in 3 months for more Botox. She will notify me if any changes in symptoms which would prompt different or even less frequent treatment prior.

## 2017-11-03 NOTE — PROCEDURES
BOTOX PROCEDURE NOTE     Date of Procedure:1/3/17    Reason for Proceedure: Cervical dystonia       Informed consent was obtained prior to performing this study. Two patient identifiers were confirmed with the patient prior to performing this study. A time out to determine correct patient and and agreement on procedure performed was conducted prior to the injections.     Procedure Details: After informed consent obtained the patient's head and upper neck was cleansed with alcohol rub and 300 Units of Botox (diluted 1:1) was injected in the following bilateral muscles:   50 units in each Trapezius*  40 units in each Levator Scapulae*  20 units in each Splenius Capitus*  20 units in each Longissimus*  10 units in each Spenius Cervices*  10 units in each Semispinalis Capitus*    *= denotes bilateral injections    The patient tolerated the procedure well with less than 0.25cc of blood loss. She was observed for several minutes post injection and given a handout from [employer] regarding when and where to seek help if side effects are experienced.  RTC in 3 months for more Botox as this greatly helps her symptoms of neck pain and dystonia until 3 months after last injection when pain recurs. She also hd good relief after lumbar injection with Dr ADAM Liang (she has follow up with him)

## 2017-11-09 ENCOUNTER — OFFICE VISIT (OUTPATIENT)
Dept: OBSTETRICS AND GYNECOLOGY | Facility: CLINIC | Age: 72
End: 2017-11-09
Payer: MEDICARE

## 2017-11-09 VITALS
BODY MASS INDEX: 30.05 KG/M2 | RESPIRATION RATE: 17 BRPM | DIASTOLIC BLOOD PRESSURE: 80 MMHG | HEIGHT: 66 IN | HEART RATE: 76 BPM | WEIGHT: 187 LBS | SYSTOLIC BLOOD PRESSURE: 110 MMHG

## 2017-11-09 DIAGNOSIS — Z46.89 ENCOUNTER FOR FITTING AND ADJUSTMENT OF PESSARY: Primary | ICD-10-CM

## 2017-11-09 DIAGNOSIS — N81.11 CYSTOCELE, MIDLINE: ICD-10-CM

## 2017-11-09 PROCEDURE — 99213 OFFICE O/P EST LOW 20 MIN: CPT | Mod: PBBFAC | Performed by: OBSTETRICS & GYNECOLOGY

## 2017-11-09 PROCEDURE — 99999 PR PBB SHADOW E&M-EST. PATIENT-LVL III: CPT | Mod: PBBFAC,,, | Performed by: OBSTETRICS & GYNECOLOGY

## 2017-11-09 PROCEDURE — 99999 PR STA SHADOW: CPT | Mod: PBBFAC,,, | Performed by: OBSTETRICS & GYNECOLOGY

## 2017-11-09 PROCEDURE — 99213 OFFICE O/P EST LOW 20 MIN: CPT | Mod: S$PBB | Performed by: OBSTETRICS & GYNECOLOGY

## 2017-11-09 NOTE — PROGRESS NOTES
Subjective:    Patient ID: Evy Mendoza is a 72 y.o. y.o. female.     Chief Complaint:   Chief Complaint   Patient presents with    Pessary Check     pessary fitting        History of Present Illness:   Evy presents for a pessary fitting. She was noted to have a stage 3 prolapse at her last visit. Patient reports she has noticed recently lower abdominal pressure, low back pain, and vaginal bulge.     MEDICATION LIST    Current Outpatient Prescriptions:     ACETAMINOPHEN (TYLENOL 8 HOUR ORAL), Take 1 tablet by mouth daily as needed., Disp: , Rfl:     alendronate (FOSAMAX) 70 MG tablet, Take 1 tablet (70 mg total) by mouth every 7 days., Disp: 12 tablet, Rfl: 3    calcium-vitamin D3 (CALCIUM 500 + D) 500 mg(1,250mg) -200 unit per tablet, Take 1 tablet by mouth once daily at 6am. , Disp: , Rfl:     carbamazepine (TEGRETOL) 200 mg tablet, Take 200 mg by mouth 2 (two) times daily. , Disp: , Rfl:     cetirizine (ZYRTEC) 10 MG tablet, Take 1 tablet by mouth continuous prn. , Disp: , Rfl:     cyanocobalamin 500 MCG tablet, Take 500 mcg by mouth once daily., Disp: , Rfl:     docusate sodium (COLACE) 100 MG capsule, Take 100 mg by mouth once daily., Disp: , Rfl:     fish oil-omega-3 fatty acids 300-1,000 mg capsule, Take 1 g by mouth once daily. , Disp: , Rfl:     fluticasone (FLONASE) 50 mcg/actuation nasal spray, 1 spray by Nasal route continuous prn. , Disp: , Rfl:     glucosamine-chondroitin 500-400 mg tablet, Take 1 tablet by mouth once daily at 6am. , Disp: , Rfl:     losartan-hydrochlorothiazide 50-12.5 mg (HYZAAR) 50-12.5 mg per tablet, Take 1 tablet by mouth once daily., Disp: , Rfl: 3    metoprolol succinate (TOPROL-XL) 25 MG 24 hr tablet, Take 25 mg by mouth once daily., Disp: , Rfl:     multivitamin (ONE DAILY MULTIVITAMIN) per tablet, Take 1 tablet by mouth once daily., Disp: , Rfl:     olopatadine (PATADAY) 0.2 % Drop, Place 1 drop into both eyes every evening., Disp: , Rfl:      pentosan polysulfate (ELMIRON) 100 mg capsule, Take 1 tablet by mouth 3 (three) times daily. , Disp: , Rfl:     PREMPRO 0.3-1.5 mg per tablet, Take 1 tablet by mouth once daily., Disp: 90 tablet, Rfl: 3    rabeprazole (ACIPHEX) 20 mg tablet, Take 20 mg by mouth once daily., Disp: , Rfl: 6    tramadol (ULTRAM) 50 mg tablet, Take 50 mg by mouth 2 (two) times daily., Disp: , Rfl: 1    VYTORIN 10-20 10-20 mg per tablet, Take 1 tablet by mouth once daily., Disp: , Rfl: 0    lidocaine-prilocaine (EMLA) cream, Apply topically 2 (two) times daily as needed. Apply to painful areas of skin, Disp: 30 g, Rfl: 3    PRE-PESSARY CHECK COUNSELING:  The patient was informed of the risks of pain or discomfort, continuous incontinence, urinary retention with insertion of a pessary and malodorous discharge and/or possible bleeding from granulation tissue after wearing the pessary for sometime. She was counseled on the alternatives to pessary insertion, including surgery or no treatment with continued symptoms, and she agrees to proceed.    PROCEDURE:  TIME OUT PERFORMED.  A number 4 ring with no support was inserted without difficulty. Patient was able to void without difficulty.     The patient was able to sit, stand, walk and either cough or urinate on the toilet after the procedure normally without expelling the pessary. The patient tolerated the procedure well.      ASSESSMENT:  Pelvic relaxation managed with pessary. 618.9.    POST PESSARY CHECK COUNSELING:  Report worsening urinary incontinency, urinary retention, pain, fever, foul smelling discharge or bleeding.  Importance of keeping follow-up appointments for a pessary check stressed.    Counseling lasted approximately 10 minutes and all her questions were answered.    FOLLOW-UP: In 3 months for pessary check.

## 2017-11-13 ENCOUNTER — OFFICE VISIT (OUTPATIENT)
Dept: OBSTETRICS AND GYNECOLOGY | Facility: CLINIC | Age: 72
End: 2017-11-13
Payer: MEDICARE

## 2017-11-13 VITALS
SYSTOLIC BLOOD PRESSURE: 120 MMHG | HEART RATE: 77 BPM | HEIGHT: 66 IN | WEIGHT: 185 LBS | DIASTOLIC BLOOD PRESSURE: 80 MMHG | RESPIRATION RATE: 13 BRPM | BODY MASS INDEX: 29.73 KG/M2

## 2017-11-13 DIAGNOSIS — N81.11 CYSTOCELE, MIDLINE: ICD-10-CM

## 2017-11-13 DIAGNOSIS — Z46.89 PESSARY MAINTENANCE: Primary | ICD-10-CM

## 2017-11-13 PROCEDURE — 99213 OFFICE O/P EST LOW 20 MIN: CPT | Mod: S$PBB | Performed by: OBSTETRICS & GYNECOLOGY

## 2017-11-13 PROCEDURE — 99999 PR PBB SHADOW E&M-EST. PATIENT-LVL III: CPT | Mod: PBBFAC,,, | Performed by: OBSTETRICS & GYNECOLOGY

## 2017-11-13 PROCEDURE — 99213 OFFICE O/P EST LOW 20 MIN: CPT | Mod: PBBFAC | Performed by: OBSTETRICS & GYNECOLOGY

## 2017-11-13 PROCEDURE — 99999 PR STA SHADOW: CPT | Mod: PBBFAC,,, | Performed by: OBSTETRICS & GYNECOLOGY

## 2017-11-13 RX ORDER — GUAIFENESIN AND CODEINE PHOSPHATE 100; 10 MG/5ML; MG/5ML
SYRUP ORAL
Refills: 0 | COMMUNITY
Start: 2017-11-12 | End: 2018-10-05

## 2017-11-13 RX ORDER — ALBUTEROL SULFATE 0.83 MG/ML
SOLUTION RESPIRATORY (INHALATION)
Refills: 0 | COMMUNITY
Start: 2017-11-12 | End: 2018-10-05

## 2017-11-13 RX ORDER — AZITHROMYCIN 250 MG/1
TABLET, FILM COATED ORAL
Refills: 0 | COMMUNITY
Start: 2017-11-12 | End: 2018-05-08

## 2017-11-14 NOTE — PROGRESS NOTES
Subjective:    Patient ID: Evy Mendoza is a 72 y.o. y.o. female.     Chief Complaint:   Chief Complaint   Patient presents with    Pessary Check       History of Present Illness:   Evy presents for a pessary check. She is unsure if it fell out when she had a bowel movement the day after it was placed.  She states she felt it slip prior to the BM but had pushed it back into place.  When she strained, she was unsure if it came out or not and could not tell due to blue urine from her medication usage.  She has no complaints of bleeding or pain. Has been able to void normally.     MEDICATION LIST    Current Outpatient Prescriptions:     ACETAMINOPHEN (TYLENOL 8 HOUR ORAL), Take 1 tablet by mouth daily as needed., Disp: , Rfl:     albuterol (PROVENTIL) 2.5 mg /3 mL (0.083 %) nebulizer solution, inhale contents of 1 vial in nebulizer four times a day for 10 days if needed for cough, Disp: , Rfl: 0    alendronate (FOSAMAX) 70 MG tablet, Take 1 tablet (70 mg total) by mouth every 7 days., Disp: 12 tablet, Rfl: 3    azithromycin (Z-GLADYS) 250 MG tablet, take 2 tablets by mouth today then take 1 tablet DAILY FOR 4 DAYS, Disp: , Rfl: 0    calcium-vitamin D3 (CALCIUM 500 + D) 500 mg(1,250mg) -200 unit per tablet, Take 1 tablet by mouth once daily at 6am. , Disp: , Rfl:     carbamazepine (TEGRETOL) 200 mg tablet, Take 200 mg by mouth 2 (two) times daily. , Disp: , Rfl:     cetirizine (ZYRTEC) 10 MG tablet, Take 1 tablet by mouth continuous prn. , Disp: , Rfl:     cyanocobalamin 500 MCG tablet, Take 500 mcg by mouth once daily., Disp: , Rfl:     docusate sodium (COLACE) 100 MG capsule, Take 100 mg by mouth once daily., Disp: , Rfl:     fish oil-omega-3 fatty acids 300-1,000 mg capsule, Take 1 g by mouth once daily. , Disp: , Rfl:     fluticasone (FLONASE) 50 mcg/actuation nasal spray, 1 spray by Nasal route continuous prn. , Disp: , Rfl:     glucosamine-chondroitin 500-400 mg tablet, Take 1 tablet by  mouth once daily at 6am. , Disp: , Rfl:     GUAIFENESIN AC  mg/5 mL liquid, TAKE 1 TEASPOONFUL BY MOUTH 4 TIMES DAILY AS NEEDED FOR COUGH, Disp: , Rfl: 0    lidocaine-prilocaine (EMLA) cream, Apply topically 2 (two) times daily as needed. Apply to painful areas of skin, Disp: 30 g, Rfl: 3    losartan-hydrochlorothiazide 50-12.5 mg (HYZAAR) 50-12.5 mg per tablet, Take 1 tablet by mouth once daily., Disp: , Rfl: 3    metoprolol succinate (TOPROL-XL) 25 MG 24 hr tablet, Take 25 mg by mouth once daily., Disp: , Rfl:     multivitamin (ONE DAILY MULTIVITAMIN) per tablet, Take 1 tablet by mouth once daily., Disp: , Rfl:     olopatadine (PATADAY) 0.2 % Drop, Place 1 drop into both eyes every evening., Disp: , Rfl:     pentosan polysulfate (ELMIRON) 100 mg capsule, Take 1 tablet by mouth 3 (three) times daily. , Disp: , Rfl:     PREMPRO 0.3-1.5 mg per tablet, Take 1 tablet by mouth once daily., Disp: 90 tablet, Rfl: 3    rabeprazole (ACIPHEX) 20 mg tablet, Take 20 mg by mouth once daily., Disp: , Rfl: 6    tramadol (ULTRAM) 50 mg tablet, Take 50 mg by mouth 2 (two) times daily., Disp: , Rfl: 1    VYTORIN 10-20 10-20 mg per tablet, Take 1 tablet by mouth once daily., Disp: , Rfl: 0    PRE-PESSARY CHECK COUNSELING:  The patient was informed of the risks of pain or discomfort, continuous incontinence, urinary retention with insertion of a pessary and malodorous discharge and/or possible bleeding from granulation tissue after wearing the pessary for sometime. She was counseled on the alternatives to pessary insertion, including surgery or no treatment with continued symptoms, and she agrees to proceed.    PROCEDURE:  TIME OUT PERFORMED.  Pessary noted to be in place.    The pessary was removed and cleaned with soap and water.  There was no granulation tissue, erythema, or ulceration present. There was no discharge present.  The pessary was re-inserted.  The patient was able to sit, stand, walk,cough and urinate  on the toilet after the procedure normally without expelling the pessary. Educated patient on removal and placement.  Pt attempted this but was unsuccessful; will continue to practice.  Re-examined patient to ensure pessary fitting properly. The patient tolerated the procedure well.    TRIMOSAN vaginal cream prescribed to be continued; pt already has this.         ASSESSMENT:    1. Pessary maintenance    2. Cystocele, midline        POST PESSARY CHECK COUNSELING:  Report worsening urinary incontinency, urinary retention, pain, fever, foul smelling discharge or bleeding.  Importance of keeping follow-up appointments for a pessary check stressed.    Counseling lasted approximately 10 minutes and all her questions were answered.    FOLLOW-UP: In 3 months for pessary check or sooner if she is unable to remove pessary or to place pessary back if she can remove it.

## 2017-12-08 ENCOUNTER — TELEPHONE (OUTPATIENT)
Dept: NEUROLOGY | Facility: CLINIC | Age: 72
End: 2017-12-08

## 2017-12-08 NOTE — TELEPHONE ENCOUNTER
Spoke with patient she states that she has this funny feeling all on one side of her head just like when she started with her Trigeminal Neuralgia. She said its like her head is split in half and its only on the left side, she didn't know if her Botox had anything to do with that. She has been on Tegretol 200 mg BID for sometime now.

## 2017-12-08 NOTE — TELEPHONE ENCOUNTER
Patient notified, voiced understanding. She will increase her Tegretol to TID and call us next week if not improved.

## 2017-12-08 NOTE — TELEPHONE ENCOUNTER
Since she has a long history of neuraglia, I think it is more likely related to that. If it is not resolved in the next week or 2 she can come in to adjust meds. If it is very painful, she can raise tegretol to TID now if not too sleepy. We can also perform brain imaging if not better soon.

## 2017-12-08 NOTE — TELEPHONE ENCOUNTER
----- Message from Ayde Dela Cruz sent at 2017  9:01 AM CST -----  Contact: self  Evy Mendoza  MRN: [id number]  : 1945  PCP: Rakesh Devine  Home Phone      [phone]  Work Phone      Not on file.  Mobile          [phone]      MESSAGE: the patient states she is having head pain on one side of her head and is concerned since she does not come back until February for another Botox injection.  Please advise.  Phone:[phone]

## 2018-02-01 ENCOUNTER — PROCEDURE VISIT (OUTPATIENT)
Dept: NEUROLOGY | Facility: CLINIC | Age: 73
End: 2018-02-01
Payer: MEDICARE

## 2018-02-01 DIAGNOSIS — G24.3 CERVICAL DYSTONIA: Primary | ICD-10-CM

## 2018-02-01 PROCEDURE — 99999 PR STA SHADOW: CPT | Mod: PBBFAC,,, | Performed by: PSYCHIATRY & NEUROLOGY

## 2018-02-01 PROCEDURE — 64616 CHEMODENERV MUSC NECK DYSTON: CPT | Mod: 50,PBBFAC | Performed by: PSYCHIATRY & NEUROLOGY

## 2018-02-01 RX ADMIN — ONABOTULINUMTOXINA 300 UNITS: 100 INJECTION, POWDER, LYOPHILIZED, FOR SOLUTION INTRADERMAL; INTRAMUSCULAR at 01:02

## 2018-02-01 NOTE — PROCEDURES
Procedures     BOTOX PROCEDURE NOTE     Date of Procedure:2/1/18    Reason for Proceedure: Cervical dystonia       Informed consent was obtained prior to performing this study. Two patient identifiers were confirmed with the patient prior to performing this study. A time out to determine correct patient and and agreement on procedure performed was conducted prior to the injections.     Procedure Details: After informed consent obtained the patient's head and upper neck was cleansed with alcohol rub and 300 Units of Botox (diluted 1:1) was injected in the following bilateral muscles:   50 units in each Trapezius*  40 units in each Levator Scapulae*  20 units in each Splenius Capitus*  20 units in each Longissimus*  10 units in each Spenius Cervices*  10 units in each Semispinalis Capitus*    *= denotes bilateral injections    The patient tolerated the procedure well with less than 0.25cc of blood loss. She was observed for several minutes post injection and given a handout from [employer] regarding when and where to seek help if side effects are experienced.  RTC in 3 months for more Botox as this greatly helps her symptoms of neck pain and dystonia until 3 months after injection when pain recurs. She follows with Dr ADAM Liang for lumbar injection PRN  Trigeminal neuralgia pain was further evaluated by PCP who recommended she avoid sleeping in certain positions that seemed to cause her pain- this is greatly improved now  An MRI C spine was ordered by PCP as it was thought her face pain was cervical radicular- she was told there was no change- will request report for review

## 2018-05-01 NOTE — TELEPHONE ENCOUNTER
----- Message from Ayde Dela Cruz sent at 2018 11:00 AM CDT -----  Contact: self  Evy Mendoza  MRN: [id number]  : 1945  PCP: Rakesh Devine  Home Phone      [phone]  Work Phone      Not on file.  Mobile          [phone]      MESSAGE: The patient states she had a pessary placed and was given a cream to use. She is not sure where she needs to get the cream since she is almost out of it.  Phone:[phone]  Pharmacy:[facility] in [place]

## 2018-05-01 NOTE — TELEPHONE ENCOUNTER
Rx authorized. Please inquire if patient still is wearing pessary.  If so, she will need an appointment for a pessary check at which time she would be given the cream.

## 2018-05-01 NOTE — TELEPHONE ENCOUNTER
[facility] Last annual 10/17  Patient Active Problem List   Diagnosis    Osteoporosis     Prior to Admission medications    Medication Sig Start Date End Date Taking? Authorizing Provider   ACETAMINOPHEN (TYLENOL 8 HOUR ORAL) Take 1 tablet by mouth daily as needed.    Historical Provider, MD   albuterol (PROVENTIL) 2.5 mg /3 mL (0.083 %) nebulizer solution inhale contents of 1 vial in nebulizer four times a day for 10 days if needed for cough 11/12/17   Historical Provider, MD   alendronate (FOSAMAX) 70 MG tablet Take 1 tablet (70 mg total) by mouth every 7 days. 10/3/17   Marisa Mcdowell MD   azithromycin (Z-GLADYS) 250 MG tablet take 2 tablets by mouth today then take 1 tablet DAILY FOR 4 DAYS 11/12/17   Historical Provider, MD   calcium-vitamin D3 (CALCIUM 500 + D) 500 mg(1,250mg) -200 unit per tablet Take 1 tablet by mouth once daily at 6am.     Historical Provider, MD   carbamazepine (TEGRETOL) 200 mg tablet Take 200 mg by mouth 2 (two) times daily.     Historical Provider, MD   cetirizine (ZYRTEC) 10 MG tablet Take 1 tablet by mouth continuous prn.  12/15/11   Historical Provider, MD   cyanocobalamin 500 MCG tablet Take 500 mcg by mouth once daily.    Historical Provider, MD   docusate sodium (COLACE) 100 MG capsule Take 100 mg by mouth once daily.    Historical Provider, MD   fish oil-omega-3 fatty acids 300-1,000 mg capsule Take 1 g by mouth once daily.     Historical Provider, MD   fluticasone (FLONASE) 50 mcg/actuation nasal spray 1 spray by Nasal route continuous prn.  12/15/11   Historical Provider, MD   glucosamine-chondroitin 500-400 mg tablet Take 1 tablet by mouth once daily at 6am.     Historical Provider, MD   GUAIFENESIN AC  mg/5 mL liquid TAKE 1 TEASPOONFUL BY MOUTH 4 TIMES DAILY AS NEEDED FOR COUGH 11/12/17   Historical Provider, MD   lidocaine-prilocaine (EMLA) cream Apply topically 2 (two) times daily as needed. Apply to painful areas of skin 5/5/17    Terry Clinton MD   losartan-hydrochlorothiazide 50-12.5 mg (HYZAAR) 50-12.5 mg per tablet Take 1 tablet by mouth once daily. 7/10/15   Historical Provider, MD   metoprolol succinate (TOPROL-XL) 25 MG 24 hr tablet Take 25 mg by mouth once daily.    Historical Provider, MD   multivitamin (ONE DAILY MULTIVITAMIN) per tablet Take 1 tablet by mouth once daily.    Historical Provider, MD   olopatadine (PATADAY) 0.2 % Drop Place 1 drop into both eyes every evening.    Historical Provider, MD   pentosan polysulfate (ELMIRON) 100 mg capsule Take 1 tablet by mouth 3 (three) times daily.  12/15/11   Historical Provider, MD   PREMPRO 0.3-1.5 mg per tablet Take 1 tablet by mouth once daily. 10/3/17   Marisa Mcdowell MD   rabeprazole (ACIPHEX) 20 mg tablet Take 20 mg by mouth once daily. 9/8/15   Historical Provider, MD   traMADol (ULTRAM) 50 mg tablet TAKE ONE TABLET BY MOUTH TWICE DAILY 4/10/18   Rakesh Devine MD   VYTORIN 10-20 10-20 mg per tablet Take 1 tablet by mouth once daily. 9/8/15   Historical Provider, MD

## 2018-05-03 ENCOUNTER — PROCEDURE VISIT (OUTPATIENT)
Dept: NEUROLOGY | Facility: CLINIC | Age: 73
End: 2018-05-03
Payer: MEDICARE

## 2018-05-03 DIAGNOSIS — G24.3 CERVICAL DYSTONIA: Primary | ICD-10-CM

## 2018-05-03 PROCEDURE — 99999 PR STA SHADOW: CPT | Mod: PBBFAC,,, | Performed by: PSYCHIATRY & NEUROLOGY

## 2018-05-03 PROCEDURE — 64616 CHEMODENERV MUSC NECK DYSTON: CPT | Mod: 50,PBBFAC | Performed by: PSYCHIATRY & NEUROLOGY

## 2018-05-03 RX ADMIN — ONABOTULINUMTOXINA 300 UNITS: 100 INJECTION, POWDER, LYOPHILIZED, FOR SOLUTION INTRADERMAL; INTRAMUSCULAR at 10:05

## 2018-05-03 NOTE — PROCEDURES
Procedures     BOTOX PROCEDURE NOTE     Date of Procedure:5/3/18    Reason for Proceedure: Cervical dystonia       Informed consent was obtained prior to performing this study. Two patient identifiers were confirmed with the patient prior to performing this study. A time out to determine correct patient and and agreement on procedure performed was conducted prior to the injections.     Procedure Details: After informed consent obtained the patient's head and upper neck was cleansed with alcohol rub and 300 Units of Botox (diluted 1:1) was injected in the following bilateral muscles:   50 units in each Trapezius*  40 units in each Levator Scapulae*  20 units in each Splenius Capitus*  20 units in each Longissimus*  10 units in each Spenius Cervices*  10 units in each Semispinalis Capitus*    *= denotes bilateral injections    The patient tolerated the procedure well with less than 0.25cc of blood loss. She was observed for several minutes post injection and given a handout from [employer] regarding when and where to seek help if side effects are experienced.  RTC in 3 months for more Botox as this greatly helps her symptoms of neck pain and dystonia until 3 months after injection when pain recurs. She follows with Dr ADAM Liang for lumbar injection PRN  Trigeminal neuralgia pain improved as prior  MRI C spine report from requested from last visit shows mild spinal stenosis.

## 2018-05-08 ENCOUNTER — OFFICE VISIT (OUTPATIENT)
Dept: OBSTETRICS AND GYNECOLOGY | Facility: CLINIC | Age: 73
End: 2018-05-08
Payer: MEDICARE

## 2018-05-08 VITALS
HEART RATE: 80 BPM | DIASTOLIC BLOOD PRESSURE: 79 MMHG | SYSTOLIC BLOOD PRESSURE: 123 MMHG | BODY MASS INDEX: 30.7 KG/M2 | WEIGHT: 191 LBS | RESPIRATION RATE: 17 BRPM | HEIGHT: 66 IN

## 2018-05-08 DIAGNOSIS — N81.11 MIDLINE CYSTOCELE: ICD-10-CM

## 2018-05-08 DIAGNOSIS — Z46.89 ENCOUNTER FOR PESSARY MAINTENANCE: Primary | ICD-10-CM

## 2018-05-08 PROCEDURE — 99999 PR PBB SHADOW E&M-EST. PATIENT-LVL III: CPT | Mod: PBBFAC,,, | Performed by: OBSTETRICS & GYNECOLOGY

## 2018-05-08 PROCEDURE — 99213 OFFICE O/P EST LOW 20 MIN: CPT | Mod: S$PBB | Performed by: OBSTETRICS & GYNECOLOGY

## 2018-05-08 PROCEDURE — 99213 OFFICE O/P EST LOW 20 MIN: CPT | Mod: PBBFAC | Performed by: OBSTETRICS & GYNECOLOGY

## 2018-05-08 PROCEDURE — 99999 PR STA SHADOW: CPT | Mod: PBBFAC,,, | Performed by: OBSTETRICS & GYNECOLOGY

## 2018-05-08 NOTE — PROGRESS NOTES
Subjective:    Patient ID: Evy Mendoza is a 73 y.o. y.o. female.     Chief Complaint:   Chief Complaint   Patient presents with    Pessary Check       History of Present Illness:   Evy presents for a pessary check. She has been doing well wearing the pessary and has no complaints of bleeding or pain. She removes it and replaces it herself.  States she occasionally has some difficulty due to hand strength but is able to clean it regularly.      MEDICATION LIST    Current Outpatient Prescriptions:     ACETAMINOPHEN (TYLENOL 8 HOUR ORAL), Take 1 tablet by mouth daily as needed., Disp: , Rfl:     albuterol (PROVENTIL) 2.5 mg /3 mL (0.083 %) nebulizer solution, inhale contents of 1 vial in nebulizer four times a day for 10 days if needed for cough, Disp: , Rfl: 0    alendronate (FOSAMAX) 70 MG tablet, Take 1 tablet (70 mg total) by mouth every 7 days., Disp: 12 tablet, Rfl: 3    calcium-vitamin D3 (CALCIUM 500 + D) 500 mg(1,250mg) -200 unit per tablet, Take 1 tablet by mouth once daily at 6am. , Disp: , Rfl:     carbamazepine (TEGRETOL) 200 mg tablet, Take 200 mg by mouth 2 (two) times daily. , Disp: , Rfl:     cetirizine (ZYRTEC) 10 MG tablet, Take 1 tablet by mouth continuous prn. , Disp: , Rfl:     cyanocobalamin 500 MCG tablet, Take 500 mcg by mouth once daily., Disp: , Rfl:     docusate sodium (COLACE) 100 MG capsule, Take 100 mg by mouth once daily., Disp: , Rfl:     fish oil-omega-3 fatty acids 300-1,000 mg capsule, Take 1 g by mouth once daily. , Disp: , Rfl:     fluticasone (FLONASE) 50 mcg/actuation nasal spray, 1 spray by Nasal route continuous prn. , Disp: , Rfl:     glucosamine-chondroitin 500-400 mg tablet, Take 1 tablet by mouth once daily at 6am. , Disp: , Rfl:     GUAIFENESIN AC  mg/5 mL liquid, TAKE 1 TEASPOONFUL BY MOUTH 4 TIMES DAILY AS NEEDED FOR COUGH, Disp: , Rfl: 0    lidocaine-prilocaine (EMLA) cream, Apply topically 2 (two) times daily as needed. Apply to  painful areas of skin, Disp: 30 g, Rfl: 3    losartan-hydrochlorothiazide 50-12.5 mg (HYZAAR) 50-12.5 mg per tablet, Take 1 tablet by mouth once daily., Disp: , Rfl: 3    metoprolol succinate (TOPROL-XL) 25 MG 24 hr tablet, Take 25 mg by mouth once daily., Disp: , Rfl:     multivitamin (ONE DAILY MULTIVITAMIN) per tablet, Take 1 tablet by mouth once daily., Disp: , Rfl:     olopatadine (PATADAY) 0.2 % Drop, Place 1 drop into both eyes every evening., Disp: , Rfl:     oxyquinoline-sod.lauryl sulfat (TRIMO-GRIGGS JELLY) 0.025-0.01 % Gel, Insert vaginally nightly three times per week., Disp: 113.4 g, Rfl: 2    pentosan polysulfate (ELMIRON) 100 mg capsule, Take 1 tablet by mouth 3 (three) times daily. , Disp: , Rfl:     PREMPRO 0.3-1.5 mg per tablet, Take 1 tablet by mouth once daily., Disp: 90 tablet, Rfl: 3    rabeprazole (ACIPHEX) 20 mg tablet, Take 20 mg by mouth once daily., Disp: , Rfl: 6    traMADol (ULTRAM) 50 mg tablet, TAKE ONE TABLET BY MOUTH TWICE DAILY, Disp: 180 tablet, Rfl: 0    VYTORIN 10-20 10-20 mg per tablet, Take 1 tablet by mouth once daily., Disp: , Rfl: 0    PRE-PESSARY CHECK COUNSELING:  The patient was informed of the risks of pain or discomfort, continuous incontinence, urinary retention with insertion of a pessary and malodorous discharge and/or possible bleeding from granulation tissue after wearing the pessary for sometime. She was counseled on the alternatives to pessary insertion, including surgery or no treatment with continued symptoms, and she agrees to proceed.    PROCEDURE:  TIME OUT PERFORMED.  The pessary was removed and cleaned with soap and water.  There was no granulation tissue, erythema, or ulceration present.   There was a slight discharge present.  The pessary was re-inserted.  The patient was able to sit, stand, walk and cough after the procedure normally without expelling the pessary. The patient tolerated the procedure well.    TRIMOSAN vaginal cream prescribed          ASSESSMENT:    1. Encounter for pessary maintenance    2. Midline cystocele        POST PESSARY CHECK COUNSELING:  Report worsening urinary incontinency, urinary retention, pain, fever, foul smelling discharge or bleeding.  Importance of keeping follow-up appointments for a pessary check stressed.    Counseling lasted approximately 10 minutes and all her questions were answered.    FOLLOW-UP: In  10/18 for annual and for pessary check.

## 2018-05-22 PROBLEM — I10 ESSENTIAL (PRIMARY) HYPERTENSION: Status: ACTIVE | Noted: 2018-05-22

## 2018-05-22 PROBLEM — G50.0 TRIGEMINAL NEURALGIA: Status: ACTIVE | Noted: 2018-05-22

## 2018-05-22 PROBLEM — E78.00 HYPERCHOLESTEREMIA: Status: ACTIVE | Noted: 2018-05-22

## 2018-05-22 PROBLEM — M81.0 AGE-RELATED OSTEOPOROSIS WITHOUT CURRENT PATHOLOGICAL FRACTURE: Status: ACTIVE | Noted: 2018-05-22

## 2018-05-22 PROBLEM — K59.04 CHRONIC IDIOPATHIC CONSTIPATION: Status: ACTIVE | Noted: 2018-05-22

## 2018-05-22 PROBLEM — Z78.0 MENOPAUSE: Status: ACTIVE | Noted: 2018-05-22

## 2018-05-30 PROBLEM — M17.10 KNEE ARTHROPATHY: Status: ACTIVE | Noted: 2018-05-30

## 2018-07-26 ENCOUNTER — PROCEDURE VISIT (OUTPATIENT)
Dept: NEUROLOGY | Facility: CLINIC | Age: 73
End: 2018-07-26
Payer: MEDICARE

## 2018-07-26 DIAGNOSIS — G24.3 CERVICAL DYSTONIA: Primary | ICD-10-CM

## 2018-07-26 PROCEDURE — 64616 CHEMODENERV MUSC NECK DYSTON: CPT | Mod: 50,S$PBB | Performed by: PSYCHIATRY & NEUROLOGY

## 2018-07-26 PROCEDURE — 99999 PR STA SHADOW: CPT | Mod: PBBFAC,,, | Performed by: PSYCHIATRY & NEUROLOGY

## 2018-07-26 RX ADMIN — ONABOTULINUMTOXINA 300 UNITS: 100 INJECTION, POWDER, LYOPHILIZED, FOR SOLUTION INTRADERMAL; INTRAMUSCULAR at 01:07

## 2018-07-26 NOTE — PROCEDURES
Procedures      BOTOX PROCEDURE NOTE     Date of Procedure:7/26/18    Reason for Proceedure: Cervical dystonia       Informed consent was obtained prior to performing this study. Two patient identifiers were confirmed with the patient prior to performing this study. A time out to determine correct patient and and agreement on procedure performed was conducted prior to the injections.     Procedure Details: After informed consent obtained the patient's head and upper neck was cleansed with alcohol rub and 300 Units of Botox (diluted 1:1) was injected in the following bilateral muscles:   50 units in each Trapezius*  40 units in each Levator Scapulae*  20 units in each Splenius Capitus*  20 units in each Longissimus*  10 units in each Spenius Cervices*  10 units in each Semispinalis Capitus*    *= denotes bilateral injections    The patient tolerated the procedure well with less than 0.25cc of blood loss. She was observed for several minutes post injection and given a handout from UpToDate regarding when and where to seek help if side effects are experienced.  RTC in 3 months for more Botox as this greatly helps her symptoms of neck pain and dystonia until 3 months after injection when pain recurs.   She follows with Dr ADAM Liang for lumbar injection PRN- has not returned recently as she is using tramadol PRN with PCP instead  Trigeminal neuralgia pain improved as prior  MRI C spine 1/2018 showed mild spinal stenosis per report

## 2018-09-01 RX ORDER — CONJUGATED ESTROGENS AND MEDROXYPROGESTERONE ACETATE .3; 1.5 MG/1; MG/1
TABLET, SUGAR COATED ORAL
Qty: 90 TABLET | Refills: 0 | Status: SHIPPED | OUTPATIENT
Start: 2018-09-01 | End: 2018-10-05 | Stop reason: SDUPTHER

## 2018-09-04 ENCOUNTER — TELEPHONE (OUTPATIENT)
Dept: OBSTETRICS AND GYNECOLOGY | Facility: CLINIC | Age: 73
End: 2018-09-04

## 2018-09-04 DIAGNOSIS — Z12.31 ENCOUNTER FOR SCREENING MAMMOGRAM FOR BREAST CANCER: Primary | ICD-10-CM

## 2018-09-04 NOTE — TELEPHONE ENCOUNTER
----- Message from Yodit Solis MA sent at 9/4/2018  4:13 PM CDT -----  Contact: garth Mendoza  MRN: [id number]  Home Phone      [phone]  Work Phone      Not on file.  Mobile          [phone]    Patient Care Team:  Rakesh Devine MD as PCP - General (Internal Medicine)  Marisa Mcdowell MD (Obstetrics and Gynecology)  OB? No  What phone number can you be reached at?   [phone]  Message:   Please link mammo orders to appt 10/05/18.  Thanks!

## 2018-10-05 ENCOUNTER — HOSPITAL ENCOUNTER (OUTPATIENT)
Dept: RADIOLOGY | Facility: HOSPITAL | Age: 73
Discharge: HOME | End: 2018-10-05
Attending: OBSTETRICS & GYNECOLOGY
Payer: MEDICARE

## 2018-10-05 ENCOUNTER — OFFICE VISIT (OUTPATIENT)
Dept: OBSTETRICS AND GYNECOLOGY | Facility: CLINIC | Age: 73
End: 2018-10-05
Payer: MEDICARE

## 2018-10-05 VITALS
RESPIRATION RATE: 16 BRPM | HEART RATE: 76 BPM | WEIGHT: 188 LBS | DIASTOLIC BLOOD PRESSURE: 84 MMHG | HEIGHT: 66 IN | BODY MASS INDEX: 30.22 KG/M2 | SYSTOLIC BLOOD PRESSURE: 126 MMHG

## 2018-10-05 VITALS — HEIGHT: 66 IN | BODY MASS INDEX: 30.37 KG/M2 | WEIGHT: 189 LBS

## 2018-10-05 DIAGNOSIS — Z01.419 WELL WOMAN EXAM WITH ROUTINE GYNECOLOGICAL EXAM: Primary | ICD-10-CM

## 2018-10-05 DIAGNOSIS — Z12.31 ENCOUNTER FOR SCREENING MAMMOGRAM FOR BREAST CANCER: ICD-10-CM

## 2018-10-05 DIAGNOSIS — Z12.39 SCREENING FOR BREAST CANCER: ICD-10-CM

## 2018-10-05 DIAGNOSIS — Z46.89 PESSARY MAINTENANCE: ICD-10-CM

## 2018-10-05 DIAGNOSIS — Z79.890 HORMONE REPLACEMENT THERAPY (HRT): ICD-10-CM

## 2018-10-05 DIAGNOSIS — M81.0 AGE-RELATED OSTEOPOROSIS WITHOUT CURRENT PATHOLOGICAL FRACTURE: ICD-10-CM

## 2018-10-05 PROCEDURE — 99213 OFFICE O/P EST LOW 20 MIN: CPT | Mod: PBBFAC,25 | Performed by: OBSTETRICS & GYNECOLOGY

## 2018-10-05 PROCEDURE — 99999 PR PBB SHADOW E&M-EST. PATIENT-LVL III: CPT | Mod: PBBFAC,,, | Performed by: OBSTETRICS & GYNECOLOGY

## 2018-10-05 PROCEDURE — 77067 SCR MAMMO BI INCL CAD: CPT | Mod: TC

## 2018-10-05 PROCEDURE — 99999 PR STA SHADOW: CPT | Mod: PBBFAC,,, | Performed by: OBSTETRICS & GYNECOLOGY

## 2018-10-05 PROCEDURE — G0101 CA SCREEN;PELVIC/BREAST EXAM: HCPCS | Mod: S$PBB | Performed by: OBSTETRICS & GYNECOLOGY

## 2018-10-05 PROCEDURE — 77067 SCR MAMMO BI INCL CAD: CPT | Mod: 26,,, | Performed by: RADIOLOGY

## 2018-10-05 PROCEDURE — 77063 BREAST TOMOSYNTHESIS BI: CPT | Mod: 26,,, | Performed by: RADIOLOGY

## 2018-10-05 PROCEDURE — 77063 BREAST TOMOSYNTHESIS BI: CPT | Mod: TC

## 2018-10-05 RX ORDER — CONJUGATED ESTROGENS AND MEDROXYPROGESTERONE ACETATE .3; 1.5 MG/1; MG/1
1 TABLET, SUGAR COATED ORAL DAILY
Qty: 90 TABLET | Refills: 3 | Status: SHIPPED | OUTPATIENT
Start: 2018-10-05 | End: 2018-12-28 | Stop reason: SDUPTHER

## 2018-10-05 RX ORDER — ALENDRONATE SODIUM 70 MG/1
70 TABLET ORAL
Qty: 12 TABLET | Refills: 3 | Status: SHIPPED | OUTPATIENT
Start: 2018-10-05 | End: 2020-11-17

## 2018-10-05 NOTE — PROGRESS NOTES
Subjective:    Patient ID: Evy Mendoza is a 73 y.o. y.o. female.     Chief Complaint: Annual Well Woman Exam     History of Present Illness:  Evy presents today for Annual Well Woman exam. She describes her menses as absent.She denies pelvic pain.  She denies breast tenderness, masses, nipple discharge. She denies difficulty with urination or bowel movements. She denies menopausal symptoms such as hotflashes, vaginal dryness, and night sweats. She denies bloating, early satiety, or weight changes. She is sexually active. Contraception is by no method.    Patient doing well with pessary. Desires refill of HRT.     The following portions of the patient's history were reviewed and updated as appropriate: allergies, current medications, past family history, past medical history, past social history, past surgical history and problem list.      Menstrual History:   No LMP recorded. Patient is postmenopausal..     OB History      Para Term  AB Living    2 2 2     2    SAB TAB Ectopic Multiple Live Births            2          The following portions of the patient's history were reviewed and updated as appropriate: allergies, current medications, past family history, past medical history, past social history, past surgical history and problem list.        ROS:     Review of Systems   Constitutional: Negative for activity change, appetite change, chills, diaphoresis, fatigue, fever and unexpected weight change.   HENT: Negative for mouth sores and tinnitus.    Eyes: Negative for discharge and visual disturbance.   Respiratory: Negative for cough, shortness of breath and wheezing.    Cardiovascular: Negative for chest pain, palpitations and leg swelling.   Gastrointestinal: Negative for abdominal pain, bloating, blood in stool, constipation, diarrhea, nausea and vomiting.   Endocrine: Negative for diabetes, hair loss, hot flashes, hyperthyroidism and hypothyroidism.   Genitourinary: Negative  "for dyspareunia, dysuria, flank pain, frequency, genital sores, hematuria, menorrhagia, menstrual problem, pelvic pain, urgency, vaginal bleeding, vaginal discharge, vaginal pain, dysmenorrhea, postcoital bleeding and vaginal odor.   Musculoskeletal: Negative for back pain, joint swelling and myalgias.   Skin:  Negative for rash and no acne.   Neurological: Negative for seizures, syncope, numbness and headaches.   Hematological: Negative for adenopathy. Does not bruise/bleed easily.   Psychiatric/Behavioral: Negative for depression and sleep disturbance. The patient is not nervous/anxious.    Breast: Negative for breast mass, breast pain, nipple discharge and skin changes      Objective:    Vital Signs:  Vitals:    10/05/18 1534   BP: 126/84   Pulse: 76   Resp: 16   Weight: 85.3 kg (188 lb)   Height: 5' 6" (1.676 m)         Physical Exam:  General:  alert, cooperative, appears stated age   Skin:  Skin color, texture, turgor normal. No rashes or lesions   HEENT:  conjunctivae/corneas clear. PERRL.   Neck: supple, trachea midline, no adenopathy or thyromegally   Respiratory:  clear to auscultation bilaterally   Heart:  regular rate and rhythm, S1, S2 normal, no murmur, click, rub or gallop   Breasts:  no discharge, erythema, or tenderness   Abdomen:  normal findings: bowel sounds normal, no masses palpable, no organomegaly and soft, non-tender   Pelvis: External genitalia: normal general appearance  Urinary system: urethral meatus normal, bladder nontender  Vaginal: normal mucosa without prolapse or lesions, atrophic mucosa  Cervix: normal appearance  Uterus: normal size, shape, position  Adnexa: normal size, nontender bilaterally   Extremities: Normal ROM; no edema, no cyanosis   Neurologial: Normal strength and tone. No focal numbness or weakness. Reflexes 2+ and equal.   Psychiatric: normal mood, speech, dress, and thought processes         Assessment:       Healthy female exam.     1. Well woman exam with routine " gynecological exam    2. Age-related osteoporosis without current pathological fracture    3. Hormone replacement therapy (HRT)    4. Screening for breast cancer    5. Pessary maintenance          Plan:       pap smear deferred per guidelines    Pessary removed, cleaned, and replaced  Refill HRT  Refill Fosamax  Discussed weight bearing exercise, calcium, and vitamin d for osteoporosis prevention  Colonoscopy up to date per patient  RTC in 3 months for pessary maintenance    COUNSELING:  Evy was counseled on STD pevention, use and side-effects of various contraceptive measures, A.C.O.G. Pap guidelines and recommendations for yearly pelvic exams in addition to recommendations for monthly self breast exams; to see her PCP for other health maintenance.

## 2018-10-25 ENCOUNTER — PROCEDURE VISIT (OUTPATIENT)
Dept: NEUROLOGY | Facility: CLINIC | Age: 73
End: 2018-10-25
Payer: MEDICARE

## 2018-10-25 DIAGNOSIS — G24.3 CERVICAL DYSTONIA: Primary | ICD-10-CM

## 2018-10-25 PROCEDURE — 64616 CHEMODENERV MUSC NECK DYSTON: CPT | Mod: 50,PBBFAC | Performed by: PSYCHIATRY & NEUROLOGY

## 2018-10-25 PROCEDURE — 99999 PR STA SHADOW: CPT | Mod: PBBFAC,,, | Performed by: PSYCHIATRY & NEUROLOGY

## 2018-10-25 RX ADMIN — ONABOTULINUMTOXINA 300 UNITS: 100 INJECTION, POWDER, LYOPHILIZED, FOR SOLUTION INTRADERMAL; INTRAMUSCULAR at 01:10

## 2018-10-25 NOTE — PROCEDURES
BOTOX PROCEDURE NOTE     Date of Procedure:10/25/18    Reason for Procedure: Cervical dystonia       Informed consent was obtained prior to performing this study. Two patient identifiers were confirmed with the patient prior to performing this study. A time out to determine correct patient and and agreement on procedure performed was conducted prior to the injections.     Procedure Details: After informed consent obtained the patient's head and upper neck was cleansed with alcohol rub and 300 Units of Botox (diluted 1:1) was injected in the following bilateral muscles:   50 units in each Trapezius*  40 units in each Levator Scapulae*  20 units in each Splenius Capitus*  20 units in each Longissimus*  10 units in each Spenius Cervices*  10 units in each Semispinalis Capitus*    *= denotes bilateral injections    The patient tolerated the procedure well with less than 0.25cc of blood loss. She was observed for several minutes post injection and given a handout from UpTo[employer] regarding when and where to seek help if side effects are experienced.  RTC in 3 months for more Botox which continues to  her symptoms of neck pain and dystonia until 3 months after injection when pain recurs.   She follows with Dr ADAM Liang for lumbar injection PRN- has not returned recently as she is using tramadol PRN with PCP instead  Trigeminal neuralgia pain improved as documented prior  MRI C spine 1/2018 showed mild spinal stenosis per report

## 2018-12-19 ENCOUNTER — TELEPHONE (OUTPATIENT)
Dept: NEUROLOGY | Facility: CLINIC | Age: 73
End: 2018-12-19

## 2018-12-19 DIAGNOSIS — M54.16 LUMBAR RADICULAR PAIN: Primary | ICD-10-CM

## 2018-12-19 NOTE — TELEPHONE ENCOUNTER
Patient is needing to see  for her back, but is needing to have an updated MRI. Can you please order. Thanks

## 2018-12-19 NOTE — TELEPHONE ENCOUNTER
----- Message from Stephania Hyatt sent at 2018 11:49 AM CST -----  Contact: PATIENT  Evy Mendoza  MRN: [id number]  : 1945  PCP: Rakesh Devine  Home Phone      [phone]  Work Phone      Not on file.  Mobile          [phone]      MESSAGE: Patient is wanting to see Dr. Dejesus but before she can be seen he wants her to have an MRI.  She would like to see if Dr. Clinton would give her the order to have it done.          Phone: [phone]

## 2018-12-20 NOTE — TELEPHONE ENCOUNTER
MRI scheduled for 12/21/18 at 10:15 am. Patient is aware. She is needing Lumbar spine and does not have any metals, clips, clautrophobia, etc.

## 2018-12-20 NOTE — TELEPHONE ENCOUNTER
Please clarify that we are talking about the L spine as requested per Dr Dejesus. Please make sure that she does not have any metal, clips, pacemakers, defibrillators or metal patches that she wears.  Schedule MRI L spine per orders otherwise.

## 2018-12-21 ENCOUNTER — HOSPITAL ENCOUNTER (OUTPATIENT)
Dept: RADIOLOGY | Facility: HOSPITAL | Age: 73
Discharge: HOME | End: 2018-12-21
Attending: PSYCHIATRY & NEUROLOGY
Payer: MEDICARE

## 2018-12-21 ENCOUNTER — TELEPHONE (OUTPATIENT)
Dept: NEUROLOGY | Facility: CLINIC | Age: 73
End: 2018-12-21

## 2018-12-21 DIAGNOSIS — M54.16 LUMBAR RADICULAR PAIN: ICD-10-CM

## 2018-12-21 PROCEDURE — 72148 MRI LUMBAR SPINE W/O DYE: CPT | Mod: 26,,, | Performed by: RADIOLOGY

## 2018-12-21 PROCEDURE — 72148 MRI LUMBAR SPINE W/O DYE: CPT | Mod: TC

## 2018-12-21 NOTE — TELEPHONE ENCOUNTER
Notify: She has moderate spinal stenosis (disc disease) which is mildly worse since her last MRI.  Please sent report to Dr Dejesus.

## 2018-12-21 NOTE — TELEPHONE ENCOUNTER
----- Message from Albertina Stevens MA sent at 2018 12:20 PM CST -----  Contact: Patient  Evy Mendoza  MRN: [id number]  : 1945  PCP: Rakesh Devine  Home Phone      [phone]  Work Phone      Not on file.  Mobile          [phone]      MESSAGE:  Patient is asking to please contact her as soon as you get her MRI results. She is wanting to know something before [place]. Patient is also asking if you can fax the results to [phone], Dr Dejesus's office. Patient can be reached at [phone] or [phone].

## 2018-12-28 RX ORDER — CONJUGATED ESTROGENS AND MEDROXYPROGESTERONE ACETATE .3; 1.5 MG/1; MG/1
1 TABLET, SUGAR COATED ORAL DAILY
Qty: 90 TABLET | Refills: 3 | Status: SHIPPED | OUTPATIENT
Start: 2018-12-28 | End: 2019-11-13 | Stop reason: SDUPTHER

## 2018-12-28 NOTE — TELEPHONE ENCOUNTER
Fax received from [facility] that patient requesting a prescription for 90 day supply of Prempro. Patient changing pharmacy for mail order due to insurance.

## 2019-01-03 ENCOUNTER — TELEPHONE (OUTPATIENT)
Dept: OBSTETRICS AND GYNECOLOGY | Facility: CLINIC | Age: 74
End: 2019-01-03

## 2019-01-04 ENCOUNTER — TELEPHONE (OUTPATIENT)
Dept: OBSTETRICS AND GYNECOLOGY | Facility: CLINIC | Age: 74
End: 2019-01-04

## 2019-01-04 NOTE — TELEPHONE ENCOUNTER
PA approved for Prempro 0.3mg tablet. Authorization good until 1/3/2021 per fax from [employer].

## 2019-01-16 ENCOUNTER — HOSPITAL ENCOUNTER (OUTPATIENT)
Dept: RADIOLOGY | Facility: HOSPITAL | Age: 74
Discharge: HOME | End: 2019-01-16
Attending: ORTHOPAEDIC SURGERY
Payer: MEDICARE

## 2019-01-16 DIAGNOSIS — M54.50 LOWER BACK PAIN: ICD-10-CM

## 2019-01-16 PROCEDURE — 72100 X-RAY EXAM L-S SPINE 2/3 VWS: CPT | Mod: 26,,, | Performed by: RADIOLOGY

## 2019-01-16 PROCEDURE — 72170 X-RAY EXAM OF PELVIS: CPT | Mod: TC

## 2019-01-16 PROCEDURE — 72120 X-RAY BEND ONLY L-S SPINE: CPT | Mod: TC

## 2019-01-16 PROCEDURE — 72100 XR LUMBAR SPINE AP AND LAT WITH FLEX/EXT: ICD-10-PCS | Mod: 26,,, | Performed by: RADIOLOGY

## 2019-01-16 PROCEDURE — 72120 X-RAY BEND ONLY L-S SPINE: CPT | Mod: 26,,, | Performed by: RADIOLOGY

## 2019-01-16 PROCEDURE — 72170 X-RAY EXAM OF PELVIS: CPT | Mod: 26,,, | Performed by: RADIOLOGY

## 2019-01-16 PROCEDURE — 72170 XR PELVIS ROUTINE AP: ICD-10-PCS | Mod: 26,,, | Performed by: RADIOLOGY

## 2019-01-16 PROCEDURE — 72120 XR LUMBAR SPINE AP AND LAT WITH FLEX/EXT: ICD-10-PCS | Mod: 26,,, | Performed by: RADIOLOGY

## 2019-01-18 ENCOUNTER — PROCEDURE VISIT (OUTPATIENT)
Dept: NEUROLOGY | Facility: CLINIC | Age: 74
End: 2019-01-18
Payer: MEDICARE

## 2019-01-18 DIAGNOSIS — G24.3 CERVICAL DYSTONIA: Primary | ICD-10-CM

## 2019-01-18 PROCEDURE — 64616 CHEMODENERV MUSC NECK DYSTON: CPT | Mod: 50,S$GLB,, | Performed by: PSYCHIATRY & NEUROLOGY

## 2019-01-18 PROCEDURE — 64616 PR CHEMODENERVATION NECK MUSCLES EXC LARNYNX, UNI: ICD-10-PCS | Mod: 50,S$GLB,, | Performed by: PSYCHIATRY & NEUROLOGY

## 2019-01-18 NOTE — PROCEDURES
BOTOX PROCEDURE NOTE     Date of Procedure:1/18/19    Reason for Procedure: Cervical dystonia       Informed consent was obtained prior to performing this study. Two patient identifiers were confirmed with the patient prior to performing this study. A time out to determine correct patient and and agreement on procedure performed was conducted prior to the injections.     Procedure Details: After informed consent obtained the patient's head and upper neck was cleansed with alcohol rub and 300 Units of Botox (diluted 1:1) was injected in the following bilateral muscles:   50 units in each Trapezius*  40 units in each Levator Scapulae*  20 units in each Splenius Capitus*  20 units in each Longissimus*  10 units in each Spenius Cervices*  10 units in each Semispinalis Capitus*    *= denotes bilateral injections    The patient tolerated the procedure well with less than 0.25cc of blood loss. She was observed for several minutes post injection and given a handout from UpToDate regarding when and where to seek help if side effects are experienced.  RTC in 3 months for more Botox which continues to  her symptoms of neck pain and dystonia until 3 months after injection when pain recurs.   She follows with Dr ADAM Liang for lumbar injection PRN- has not returned recently as she is using tramadol PRN with PCP instead, and had updated MRI L spine as requested by Dr Dejesus which was largely unchanged and he did not recommend surgery in 2018  Trigeminal neuralgia pain improved as documented prior  MRI C spine 1/2018 showed mild spinal stenosis per report

## 2019-01-29 PROBLEM — M48.061 SPINAL STENOSIS OF LUMBAR REGION AT MULTIPLE LEVELS: Status: ACTIVE | Noted: 2019-01-29

## 2019-02-21 RX ORDER — LIDOCAINE AND PRILOCAINE 25; 25 MG/G; MG/G
CREAM TOPICAL 2 TIMES DAILY PRN
Qty: 30 G | Refills: 3 | Status: SHIPPED | OUTPATIENT
Start: 2019-02-21 | End: 2021-03-02

## 2019-02-21 NOTE — TELEPHONE ENCOUNTER
----- Message from Stephania Hyatt sent at 2019  8:54 AM CST -----  Contact: PATIENT  Evy Mendoza  MRN: [id number]  : 1945  PCP: Rakesh Devine  Home Phone      [phone]  Work Phone      Not on file.  Mobile          [phone]      MESSAGE: Patient states that Dr. Clinton has prescribed a pain cream for her in the past and would like to see if Dr. Clinton can send a refill to [facility].  She has changed insurance company and [facility] is now her new pharmacy.      Phone: [phone]

## 2019-04-18 ENCOUNTER — PROCEDURE VISIT (OUTPATIENT)
Dept: NEUROLOGY | Facility: CLINIC | Age: 74
End: 2019-04-18
Payer: MEDICARE

## 2019-04-18 DIAGNOSIS — G24.3 CERVICAL DYSTONIA: Primary | ICD-10-CM

## 2019-04-18 PROCEDURE — 64616 CHEMODENERV MUSC NECK DYSTON: CPT | Mod: 50,S$GLB,, | Performed by: PSYCHIATRY & NEUROLOGY

## 2019-04-18 PROCEDURE — 64616 PR CHEMODENERVATION NECK MUSCLES EXC LARNYNX, UNI: ICD-10-PCS | Mod: 50,S$GLB,, | Performed by: PSYCHIATRY & NEUROLOGY

## 2019-04-18 NOTE — PROCEDURES
Procedures   BOTOX PROCEDURE NOTE     Date of Procedure:4/19/18    Reason for Procedure: Cervical dystonia       Informed consent was obtained prior to performing this study. Two patient identifiers were confirmed with the patient prior to performing this study. A time out to determine correct patient and and agreement on procedure performed was conducted prior to the injections.     Procedure Details: After informed consent obtained the patient's head and upper neck was cleansed with alcohol rub and 300 Units of Botox (diluted 1:1) was injected in the following bilateral muscles:   50 units in each Trapezius*  40 units in each Levator Scapulae*  20 units in each Splenius Capitus*  20 units in each Longissimus*  10 units in each Spenius Cervices*  10 units in each Semispinalis Capitus*    *= denotes bilateral injections    The patient tolerated the procedure well with less than 0.25cc of blood loss. She was observed for several minutes post injection and given a handout from UpToDate regarding when and where to seek help if side effects are experienced.  RTC in 3 months for more Botox which continues to  her symptoms of neck pain and dystonia until 3 months after injection when pain recurs.   She follows with Dr ADAM Liang for lumbar injection PRN- has not returned recently as she is using tramadol PRN with PCP instead and is considering another opinoin, and had updated MRI L spine as requested by Dr Dejesus which was largely unchanged and he did not recommend surgery in 2018  Trigeminal neuralgia pain improved as documented prior  MRI C spine 1/2018 showed mild spinal stenosis per report

## 2019-05-27 PROBLEM — M25.562 ACUTE PAIN OF LEFT KNEE: Status: ACTIVE | Noted: 2019-05-27

## 2019-06-17 ENCOUNTER — TELEPHONE (OUTPATIENT)
Dept: OBSTETRICS AND GYNECOLOGY | Facility: CLINIC | Age: 74
End: 2019-06-17

## 2019-06-17 DIAGNOSIS — Z12.31 ENCOUNTER FOR SCREENING MAMMOGRAM FOR BREAST CANCER: Primary | ICD-10-CM

## 2019-06-17 NOTE — TELEPHONE ENCOUNTER
----- Message from Silva Eller MA sent at 6/17/2019  9:54 AM CDT -----  Contact: self      ----- Message -----  From: Yodit Solis MA  Sent: 6/17/2019   9:51 AM  To: Jeremias Cunningham Staff    Evy Mendoza  MRN: [id number]  Home Phone      [phone]  Work Phone      Not on file.  [facility]          [phone]    Patient Care Team:  Rakesh Devine MD as PCP - General (Internal Medicine)  Marisa Mcdowell MD (Obstetrics and Gynecology)  OB? No  What phone number can you be reached at?  [phone]  Message:   Please link mammo orders to appt 10/08/19.  Thanks!

## 2019-07-18 ENCOUNTER — PROCEDURE VISIT (OUTPATIENT)
Dept: NEUROLOGY | Facility: CLINIC | Age: 74
End: 2019-07-18
Payer: MEDICARE

## 2019-07-18 DIAGNOSIS — G24.3 CERVICAL DYSTONIA: Primary | ICD-10-CM

## 2019-07-18 PROCEDURE — 64616 CHEMODENERV MUSC NECK DYSTON: CPT | Mod: 50,S$GLB,, | Performed by: PSYCHIATRY & NEUROLOGY

## 2019-07-18 PROCEDURE — 64616 PR CHEMODENERVATION NECK MUSCLES EXC LARNYNX, UNI: ICD-10-PCS | Mod: 50,S$GLB,, | Performed by: PSYCHIATRY & NEUROLOGY

## 2019-07-18 NOTE — PROCEDURES
Procedures     BOTOX PROCEDURE NOTE     Date of Procedure:7/118/19    Reason for Procedure: Cervical dystonia       Informed consent was obtained prior to performing this study. Two patient identifiers were confirmed with the patient prior to performing this study. A time out to determine correct patient and and agreement on procedure performed was conducted prior to the injections.     Procedure Details: After informed consent obtained the patient's head and upper neck was cleansed with alcohol rub and 300 Units of Botox (diluted 1:1) was injected in the following bilateral muscles:   50 units in each Trapezius*  40 units in each Levator Scapulae*  20 units in each Splenius Capitus*  20 units in each Longissimus*  10 units in each Spenius Cervices*  10 units in each Semispinalis Capitus*    *= denotes bilateral injections    The patient tolerated the procedure well with less than 0.25cc of blood loss. She was observed for several minutes post injection and given a handout from UpToDate regarding when and where to seek help if side effects are experienced.  RTC in 3 months for more Botox which continues to  her symptoms of neck pain and dystonia until 3 months after injection when pain recurs.   She follows with Dr ADAM Liang for lumbar injection PRN- has not returned recently as she is using tramadol PRN with PCP instead and is considering another opinoin, and had updated MRI L spine as requested by Dr Dejesus which was largely unchanged and he did not recommend surgery in 2018  Trigeminal neuralgia pain improved as documented prior and PCP follows this and labs  MRI C spine 1/2018 showed mild spinal stenosis per report  She states she has had one spell of light headedness with lower BP (systolic 100) noted. I advised her to review BP meds and this symptom with prescribing provider.

## 2019-10-10 ENCOUNTER — PROCEDURE VISIT (OUTPATIENT)
Dept: NEUROLOGY | Facility: CLINIC | Age: 74
End: 2019-10-10
Payer: MEDICARE

## 2019-10-10 DIAGNOSIS — G24.3 CERVICAL DYSTONIA: Primary | ICD-10-CM

## 2019-10-10 PROCEDURE — 64616 CHEMODENERV MUSC NECK DYSTON: CPT | Mod: 50,S$GLB,, | Performed by: PSYCHIATRY & NEUROLOGY

## 2019-10-10 PROCEDURE — 64616 PR CHEMODENERVATION NECK MUSCLES EXC LARNYNX, UNI: ICD-10-PCS | Mod: 50,S$GLB,, | Performed by: PSYCHIATRY & NEUROLOGY

## 2019-10-10 NOTE — PROCEDURES
Procedures     BOTOX PROCEDURE NOTE     Date of Procedure:7/118/19    Reason for Procedure: Cervical dystonia       Informed consent was obtained prior to performing this study. Two patient identifiers were confirmed with the patient prior to performing this study. A time out to determine correct patient and and agreement on procedure performed was conducted prior to the injections.     Procedure Details: After informed consent obtained the patient's head and upper neck was cleansed with alcohol rub and 300 Units of Botox (diluted 1:1) was injected in the following bilateral muscles:   50 units in each Trapezius*  40 units in each Levator Scapulae*  20 units in each Splenius Capitus*  20 units in each Longissimus*  10 units in each Spenius Cervices*  10 units in each Semispinalis Capitus*    *= denotes bilateral injections    The patient tolerated the procedure well with less than 0.25cc of blood loss. She was observed for several minutes post injection and given a handout from UpToDate regarding when and where to seek help if side effects are experienced.    RTC in 4 months for more Botox which continues to  her symptoms of neck pain and dystonia and she feels she can try to space injections at this point  She follows with Dr ADAM Liang for lumbar injection PRN- has not returned recently as she is using tramadol PRN with PCP instead  and had updated MRI L spine as requested by Dr Dejesus which was largely unchanged and he did not recommend surgery in 2018  Trigeminal neuralgia pain improved as documented prior and PCP follows this and labs  MRI C spine 1/2018 showed mild spinal stenosis per report

## 2019-10-23 PROBLEM — G50.0 TRIGEMINAL NEURALGIA: Chronic | Status: ACTIVE | Noted: 2018-05-22

## 2019-10-23 PROBLEM — K59.04 CHRONIC IDIOPATHIC CONSTIPATION: Chronic | Status: ACTIVE | Noted: 2018-05-22

## 2019-10-23 PROBLEM — M48.061 SPINAL STENOSIS OF LUMBAR REGION AT MULTIPLE LEVELS: Chronic | Status: ACTIVE | Noted: 2019-01-29

## 2019-10-23 PROBLEM — M81.0 AGE-RELATED OSTEOPOROSIS WITHOUT CURRENT PATHOLOGICAL FRACTURE: Chronic | Status: ACTIVE | Noted: 2018-05-22

## 2019-10-23 PROBLEM — I10 ESSENTIAL (PRIMARY) HYPERTENSION: Chronic | Status: ACTIVE | Noted: 2018-05-22

## 2019-10-23 PROBLEM — E78.00 HYPERCHOLESTEREMIA: Chronic | Status: ACTIVE | Noted: 2018-05-22

## 2019-11-08 ENCOUNTER — TELEPHONE (OUTPATIENT)
Dept: ORTHOPEDICS | Facility: CLINIC | Age: 74
End: 2019-11-08

## 2019-11-08 NOTE — TELEPHONE ENCOUNTER
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

## 2019-11-13 ENCOUNTER — HOSPITAL ENCOUNTER (OUTPATIENT)
Dept: RADIOLOGY | Facility: HOSPITAL | Age: 74
Discharge: HOME | End: 2019-11-13
Attending: OBSTETRICS & GYNECOLOGY
Payer: MEDICARE

## 2019-11-13 ENCOUNTER — OFFICE VISIT (OUTPATIENT)
Dept: OBSTETRICS AND GYNECOLOGY | Facility: CLINIC | Age: 74
End: 2019-11-13
Payer: MEDICARE

## 2019-11-13 VITALS — BODY MASS INDEX: 31.02 KG/M2 | HEIGHT: 66 IN | WEIGHT: 193 LBS

## 2019-11-13 VITALS
HEART RATE: 88 BPM | HEIGHT: 66 IN | SYSTOLIC BLOOD PRESSURE: 120 MMHG | BODY MASS INDEX: 30.7 KG/M2 | WEIGHT: 191 LBS | DIASTOLIC BLOOD PRESSURE: 80 MMHG | RESPIRATION RATE: 10 BRPM

## 2019-11-13 DIAGNOSIS — Z12.31 ENCOUNTER FOR SCREENING MAMMOGRAM FOR BREAST CANCER: ICD-10-CM

## 2019-11-13 DIAGNOSIS — N95.8 OTHER SPECIFIED MENOPAUSAL AND PERIMENOPAUSAL DISORDERS: ICD-10-CM

## 2019-11-13 DIAGNOSIS — Z46.89 PESSARY MAINTENANCE: ICD-10-CM

## 2019-11-13 DIAGNOSIS — Z12.39 SCREENING FOR BREAST CANCER: ICD-10-CM

## 2019-11-13 DIAGNOSIS — Z13.820 OSTEOPOROSIS SCREENING: ICD-10-CM

## 2019-11-13 DIAGNOSIS — Z01.419 WELL WOMAN EXAM WITH ROUTINE GYNECOLOGICAL EXAM: Primary | ICD-10-CM

## 2019-11-13 DIAGNOSIS — Z79.890 HORMONE REPLACEMENT THERAPY (HRT): ICD-10-CM

## 2019-11-13 PROCEDURE — 99999 PR PBB SHADOW E&M-EST. PATIENT-LVL III: ICD-10-PCS | Mod: PBBFAC,,, | Performed by: OBSTETRICS & GYNECOLOGY

## 2019-11-13 PROCEDURE — 77080 DXA BONE DENSITY AXIAL: CPT | Mod: TC

## 2019-11-13 PROCEDURE — 77067 SCR MAMMO BI INCL CAD: CPT | Mod: TC

## 2019-11-13 PROCEDURE — 99999 PR PBB SHADOW E&M-EST. PATIENT-LVL III: CPT | Mod: PBBFAC,,, | Performed by: OBSTETRICS & GYNECOLOGY

## 2019-11-13 PROCEDURE — 77067 MAMMO DIGITAL SCREENING BILAT WITH TOMOSYNTHESIS_CAD: ICD-10-PCS | Mod: 26,,, | Performed by: RADIOLOGY

## 2019-11-13 PROCEDURE — 77067 SCR MAMMO BI INCL CAD: CPT | Mod: 26,,, | Performed by: RADIOLOGY

## 2019-11-13 PROCEDURE — G0101 CA SCREEN;PELVIC/BREAST EXAM: HCPCS | Mod: S$GLB,,, | Performed by: OBSTETRICS & GYNECOLOGY

## 2019-11-13 PROCEDURE — G0101 PR CA SCREEN;PELVIC/BREAST EXAM: ICD-10-PCS | Mod: S$GLB,,, | Performed by: OBSTETRICS & GYNECOLOGY

## 2019-11-13 PROCEDURE — 77063 BREAST TOMOSYNTHESIS BI: CPT | Mod: 26,,, | Performed by: RADIOLOGY

## 2019-11-13 PROCEDURE — 77063 MAMMO DIGITAL SCREENING BILAT WITH TOMOSYNTHESIS_CAD: ICD-10-PCS | Mod: 26,,, | Performed by: RADIOLOGY

## 2019-11-13 RX ORDER — CONJUGATED ESTROGENS AND MEDROXYPROGESTERONE ACETATE .3; 1.5 MG/1; MG/1
1 TABLET, SUGAR COATED ORAL DAILY
Qty: 90 TABLET | Refills: 3 | Status: SHIPPED | OUTPATIENT
Start: 2019-11-13 | End: 2020-08-19

## 2019-11-13 NOTE — PROGRESS NOTES
Subjective:    Patient ID: Evy Mendoza is a 74 y.o. y.o. female.     Chief Complaint: Annual Well Woman Exam     History of Present Illness:  vEy presents today for Annual Well Woman exam. She describes her menses as absent.She denies pelvic pain.  She denies breast tenderness, masses, nipple discharge. She denies difficulty with urination or bowel movements. She denies menopausal symptoms such as hotflashes, vaginal dryness, and night sweats. She denies bloating, early satiety, or weight changes. She is sexually active. Contraception is by no method.    Patient doing well with pessary. Desires refill of HRT. Patient has been on Fosamax for many years, discussed stopping.     The following portions of the patient's history were reviewed and updated as appropriate: allergies, current medications, past family history, past medical history, past social history, past surgical history and problem list.      Menstrual History:   No LMP recorded. Patient is postmenopausal..     OB History        2    Para   2    Term   2            AB        Living   2       SAB        TAB        Ectopic        Multiple        Live Births   2                 The following portions of the patient's history were reviewed and updated as appropriate: allergies, current medications, past family history, past medical history, past social history, past surgical history and problem list.        ROS:     Review of Systems   Constitutional: Negative for activity change, appetite change, chills, diaphoresis, fatigue, fever and unexpected weight change.   HENT: Negative for mouth sores and tinnitus.    Eyes: Negative for discharge and visual disturbance.   Respiratory: Negative for cough, shortness of breath and wheezing.    Cardiovascular: Negative for chest pain, palpitations and leg swelling.   Gastrointestinal: Negative for abdominal pain, bloating, blood in stool, constipation, diarrhea, nausea and vomiting.  "  Endocrine: Negative for diabetes, hair loss, hot flashes, hyperthyroidism and hypothyroidism.   Genitourinary: Negative for dysmenorrhea, dyspareunia, dysuria, flank pain, frequency, genital sores, hematuria, menorrhagia, menstrual problem, pelvic pain, urgency, vaginal bleeding, vaginal discharge, vaginal pain, postcoital bleeding and vaginal odor.   Musculoskeletal: Negative for back pain, joint swelling and myalgias.   Integumentary:  Negative for rash, acne, breast mass, nipple discharge and breast skin changes.   Neurological: Negative for seizures, syncope, numbness and headaches.   Hematological: Negative for adenopathy. Does not bruise/bleed easily.   Psychiatric/Behavioral: Negative for depression and sleep disturbance. The patient is not nervous/anxious.    Breast: Negative for mass, mastodynia, nipple discharge and skin changes      Objective:    Vital Signs:  Vitals:    11/13/19 1007   BP: 120/80   Pulse: 88   Resp: 10   Weight: 86.6 kg (191 lb)   Height: 5' 6" (1.676 m)         Physical Exam:  General:  alert, cooperative, appears stated age   Skin:  Skin color, texture, turgor normal. No rashes or lesions   HEENT:  conjunctivae/corneas clear. PERRL.   Neck: supple, trachea midline, no adenopathy or thyromegally   Respiratory:  clear to auscultation bilaterally   Heart:  regular rate and rhythm, S1, S2 normal, no murmur, click, rub or gallop   Breasts:  no discharge, erythema, or tenderness   Abdomen:  normal findings: bowel sounds normal, no masses palpable, no organomegaly and soft, non-tender   Pelvis: External genitalia: normal general appearance  Urinary system: urethral meatus normal, bladder nontender  Vaginal: normal mucosa without prolapse or lesions, atrophic mucosa  Cervix: normal appearance  Uterus: normal size, shape, position  Adnexa: normal size, nontender bilaterally   Extremities: Normal ROM; no edema, no cyanosis   Neurologial: Normal strength and tone. No focal numbness or weakness. " Reflexes 2+ and equal.   Psychiatric: normal mood, speech, dress, and thought processes         Assessment:       Healthy female exam.     1. Well woman exam with routine gynecological exam    2. Osteoporosis screening    3. Other specified menopausal and perimenopausal disorders     4. Hormone replacement therapy (HRT)    5. Screening for breast cancer    6. Pessary maintenance          Plan:       pap smear deferred per guidelines    Pessary removed, cleaned, and replaced; patient does well with maintaining pessary herself  Refill HRT  Stop Fosamax  Discussed weight bearing exercise, calcium, and vitamin d for osteoporosis prevention  Colonoscopy up to date per patient  DEXA scan ordered     COUNSELING:  Evy was counseled on STD pevention, use and side-effects of various contraceptive measures, A.C.O.G. Pap guidelines and recommendations for yearly pelvic exams in addition to recommendations for monthly self breast exams; to see her PCP for other health maintenance.

## 2019-11-14 ENCOUNTER — HOSPITAL ENCOUNTER (OUTPATIENT)
Dept: RADIOLOGY | Facility: HOSPITAL | Age: 74
Discharge: HOME | End: 2019-11-14
Attending: ORTHOPAEDIC SURGERY
Payer: MEDICARE

## 2019-11-14 ENCOUNTER — TELEPHONE (OUTPATIENT)
Dept: OBSTETRICS AND GYNECOLOGY | Facility: CLINIC | Age: 74
End: 2019-11-14

## 2019-11-14 ENCOUNTER — TELEPHONE (OUTPATIENT)
Dept: RADIOLOGY | Facility: HOSPITAL | Age: 74
End: 2019-11-14

## 2019-11-14 ENCOUNTER — OFFICE VISIT (OUTPATIENT)
Dept: ORTHOPEDICS | Facility: CLINIC | Age: 74
End: 2019-11-14
Payer: MEDICARE

## 2019-11-14 VITALS — WEIGHT: 191.56 LBS | HEIGHT: 63 IN | BODY MASS INDEX: 33.94 KG/M2

## 2019-11-14 DIAGNOSIS — M25.562 CHRONIC PAIN OF LEFT KNEE: ICD-10-CM

## 2019-11-14 DIAGNOSIS — M17.12 PRIMARY OSTEOARTHRITIS OF LEFT KNEE: ICD-10-CM

## 2019-11-14 DIAGNOSIS — G89.29 CHRONIC PAIN OF LEFT KNEE: ICD-10-CM

## 2019-11-14 DIAGNOSIS — M25.562 CHRONIC PAIN OF LEFT KNEE: Primary | ICD-10-CM

## 2019-11-14 DIAGNOSIS — G89.29 CHRONIC PAIN OF LEFT KNEE: Primary | ICD-10-CM

## 2019-11-14 PROCEDURE — 99999 PR PBB SHADOW E&M-EST. PATIENT-LVL III: CPT | Mod: PBBFAC,,, | Performed by: ORTHOPAEDIC SURGERY

## 2019-11-14 PROCEDURE — 1101F PR PT FALLS ASSESS DOC 0-1 FALLS W/OUT INJ PAST YR: ICD-10-PCS | Mod: CPTII,S$GLB,, | Performed by: ORTHOPAEDIC SURGERY

## 2019-11-14 PROCEDURE — 73560 X-RAY EXAM OF KNEE 1 OR 2: CPT | Mod: TC,LT,59

## 2019-11-14 PROCEDURE — 1101F PT FALLS ASSESS-DOCD LE1/YR: CPT | Mod: CPTII,S$GLB,, | Performed by: ORTHOPAEDIC SURGERY

## 2019-11-14 PROCEDURE — 99204 PR OFFICE/OUTPT VISIT, NEW, LEVL IV, 45-59 MIN: ICD-10-PCS | Mod: S$GLB,,, | Performed by: ORTHOPAEDIC SURGERY

## 2019-11-14 PROCEDURE — 99999 PR PBB SHADOW E&M-EST. PATIENT-LVL III: ICD-10-PCS | Mod: PBBFAC,,, | Performed by: ORTHOPAEDIC SURGERY

## 2019-11-14 PROCEDURE — 73560 XR KNEE ORTHO LEFT: ICD-10-PCS | Mod: 26,59,RT, | Performed by: RADIOLOGY

## 2019-11-14 PROCEDURE — 73562 X-RAY EXAM OF KNEE 3: CPT | Mod: 26,LT,, | Performed by: RADIOLOGY

## 2019-11-14 PROCEDURE — 99204 OFFICE O/P NEW MOD 45 MIN: CPT | Mod: S$GLB,,, | Performed by: ORTHOPAEDIC SURGERY

## 2019-11-14 PROCEDURE — 73562 XR KNEE ORTHO LEFT: ICD-10-PCS | Mod: 26,LT,, | Performed by: RADIOLOGY

## 2019-11-14 PROCEDURE — 73560 X-RAY EXAM OF KNEE 1 OR 2: CPT | Mod: 26,LT,ICN, | Performed by: RADIOLOGY

## 2019-11-14 PROCEDURE — 73560 X-RAY EXAM OF KNEE 1 OR 2: CPT | Mod: 26,59,RT, | Performed by: RADIOLOGY

## 2019-11-14 PROCEDURE — 73560 X-RAY EXAM OF KNEE 1 OR 2: CPT | Mod: TC,RT

## 2019-11-14 PROCEDURE — 73560 XR KNEE 1 OR 2 VIEW LEFT: ICD-10-PCS | Mod: 26,LT,ICN, | Performed by: RADIOLOGY

## 2019-11-14 NOTE — PROGRESS NOTES
HPI:    Evy Mendoza is a 74 y.o. female who is here today for   Chief Complaint   Patient presents with    Left Knee - Pain   .     Duration: 12 months  Intensity: severe  Character of pain: sharp  Location: She reports that the pain is predominately  lateral  Patient's pain increases with activity.  Pain is increased with weightbearing and interferes with activities of daily living.    She has tried conservative management including NSAIDS, injections, and activity modification without relief.    She has discussed options with her family and wishes to schedule TKA.     PMSFSH reviewed per clinic record       Past Medical History:   Diagnosis Date    Arthritis     Cataract     Fibrocystic breast     Hyperlipidemia     Hypertension     Interstitial cystitis     Osteoporosis           Current Outpatient Medications:     ACETAMINOPHEN (TYLENOL 8 HOUR ORAL), Take 1 tablet by mouth daily as needed., Disp: , Rfl:     calcium-vitamin D3 (CALCIUM 500 + D) 500 mg(1,250mg) -200 unit per tablet, Take 1 tablet by mouth once daily at 6am. , Disp: , Rfl:     carBAMazepine (TEGRETOL) 200 mg tablet, TAKE 1 TABLET TWICE DAILY, Disp: 180 tablet, Rfl: 3    cetirizine (ZYRTEC) 10 MG tablet, Take 1 tablet by mouth continuous prn. , Disp: , Rfl:     cyanocobalamin 500 MCG tablet, Take 500 mcg by mouth once daily., Disp: , Rfl:     docusate sodium (COLACE) 100 MG capsule, Take 100 mg by mouth once daily., Disp: , Rfl:     ELMIRON 100 mg Cap, TAKE 1 CAPSULE THREE TIMES DAILY, Disp: 270 capsule, Rfl: 3    ezetimibe (ZETIA) 10 mg tablet, Take 1 tablet (10 mg total) by mouth once daily., Disp: 90 tablet, Rfl: 3    fish oil-omega-3 fatty acids 300-1,000 mg capsule, Take 1 g by mouth once daily. , Disp: , Rfl:     FLUAD 4932-0537, 65 YR UP,,PF, 45 mcg (15 mcg x 3)/0.5 mL Syrg, ADM 0.5ML IM UTD, Disp: , Rfl: 0    fluticasone (FLONASE) 50 mcg/actuation nasal spray, 1 spray by Nasal route continuous prn. , Disp: , Rfl:      glucosamine-chondroitin 500-400 mg tablet, Take 1 tablet by mouth once daily at 6am. , Disp: , Rfl:     metoprolol succinate (TOPROL-XL) 50 MG 24 hr tablet, TAKE 1/2 TABLET (25MG) BY MOUTH ONE TIME DAILY , Disp: 45 tablet, Rfl: 3    multivitamin (ONE DAILY MULTIVITAMIN) per tablet, Take 1 tablet by mouth once daily., Disp: , Rfl:     oxyquinoline-sod.lauryl sulfat (TRIMO-GRIGGS JELLY) 0.025-0.01 % Gel, Insert vaginally nightly three times per week., Disp: 113.4 g, Rfl: 2    PREMPRO 0.3-1.5 mg per tablet, Take 1 tablet by mouth once daily., Disp: 90 tablet, Rfl: 3    RABEprazole (ACIPHEX) 20 mg tablet, TAKE 1 TABLET EVERY DAY, Disp: 90 tablet, Rfl: 3    SHINGRIX, PF, 50 mcg/0.5 mL injection, ADM 0.5ML IM UTD, Disp: , Rfl: 0    simvastatin (ZOCOR) 20 MG tablet, Take 1 tablet (20 mg total) by mouth every evening., Disp: 90 tablet, Rfl: 3    traMADol (ULTRAM) 50 mg tablet, TAKE 1 TABLET TWICE DAILY, Disp: 60 tablet, Rfl: 0    albuterol (PROVENTIL) 2.5 mg /3 mL (0.083 %) nebulizer solution, INHALE THE CONTENTS OF 1 VIAL VIA NEBULIZER FOUR TIMES A DAY AS NEEDED FOR WHEEZING, Disp: , Rfl: 0    alendronate (FOSAMAX) 70 MG tablet, Take 1 tablet (70 mg total) by mouth every 7 days. (Patient not taking: Reported on 11/14/2019), Disp: 12 tablet, Rfl: 3    ciclopirox (PENLAC) 8 % Soln, APPLY TOPICALLY NIGHTLY. (Patient not taking: Reported on 11/13/2019), Disp: 3 mL, Rfl: 0    gabapentin (NEURONTIN) 300 MG capsule, Take 1 capsule (300 mg total) by mouth every evening. (Patient not taking: Reported on 11/14/2019), Disp: 30 capsule, Rfl: 0    lidocaine (LIDODERM) 5 %, Place 1 patch onto the skin once daily. Remove & Discard patch within 12 hours or as directed by MD (Patient not taking: Reported on 11/14/2019), Disp: 14 patch, Rfl: 0    lidocaine-prilocaine (EMLA) cream, Apply topically 2 (two) times daily as needed. (Patient not taking: Reported on 11/14/2019), Disp: 30 g, Rfl: 3    losartan-hydrochlorothiazide  "50-12.5 mg (HYZAAR) 50-12.5 mg per tablet, TAKE 1 TABLET EVERY DAY (Patient not taking: Reported on 11/14/2019), Disp: 90 tablet, Rfl: 3    mupirocin (BACTROBAN) 2 % ointment, Apply topically 3 (three) times daily. (Patient not taking: Reported on 11/14/2019), Disp: 15 g, Rfl: 0     Review of patient's allergies indicates:   Allergen Reactions    No known drug allergies         ROS  Constitutional: Negative for fever, Negative for weight loss  HENT Negative for congestion  Cardiovascular: Negative chest pain  Respiratory: Negative Shortness of breath  Heme: Negative excessive bleeding  Skin:NegativeItching, Negative breakdown  Musculoskeletal:Positive for back pain, Positive for joint pain, Positive muscle pain, Negative muscle weakness  Neurological: Negative for numbness and paresthesias   Psychiatric/Behavioral: Negative altered mental status, Negative for depression    Physical Exam:   Ht 5' 3" (1.6 m)   Wt 86.9 kg (191 lb 9.3 oz)   BMI 33.94 kg/m²   General appearance: This is a well-developed, Well nourished female No obvious acute distress.  Psychiatric: normal mood and affect;  pleasant and conversant; judgment and thought content normal  Gait is coordinated. Patient has antalgic gait to the left  Cardiovascular: There are no swelling or varicosities present.   Respiratory: normal respiratory effort   Lymphatic: no adenopathy   Neurologic: alert and oriented to person, place, and time   Deep Tendon Reflexes are normal;  Coordination and Balance: Normal   Musculoskeletal   Neck    Patient has decreased range of motion of the cervical spine.  There is tenderness in the cervical spine and guarding.     Back    ROM showsabnormal flexion, extension and     rotation    Palpation shows no masses    Stability is normal    Strength to flexion and extension well maintained    Core strength is diminished    Skin shows no rashes or cafe au lait spots;     Sensation is intact to light touch  Right hip   Range of " motion normal    Left Hip  Range of motionnormal    Right Knee  Swelling None  TendernessNone  Range of Motion: 125  Motion is painfulNo  Crepitance presentNo    Right Leg  Neurologic Intact  Pulses Intact    Left Knee: Swelling Mild  TendernessLateral joint line  Range of Motion: 5-110   Motion is painful Yes    Left Leg   Neurologic Intact  Pulses Intact    Radiograph: Show severe degenerative arthritis with subchondral sclerosis, periarticular osteophytes and narrowing of joint space.  Angle: mild valgus    Physical therapy is contraindicated due to potential bone loss on this severe arthritic joint.    Assessment:  Knee arthritis left      She will need to be cleared in our PreOp center.         .    She  has a past medical history of Arthritis, Cataract, Fibrocystic breast, Hyperlipidemia, Hypertension, Interstitial cystitis, and Osteoporosis. . We will have to take this into account. She  will be followed by the hospitalist service while in the hospital.       We have gone over the hospitalization and recovery with her as well.  This is typically around 2 weeks on a walker and transition to a cane after that.  She will have home health likely for 3-4 weeks and then transition to outpatient if necessary.  She is agreement with this plan of care and is ready to proceed.  I will see her back for clinical recheck at the 2-week postop valerie.  I will see her back for clinical recheck for any other questions or problems as needed and certainly for any other issues in the interim.    We have discussed risks of total knee replacement which include but are not limited to blood clots in the legs that can travel to the lungs (pulmonary embolism). Pulmonary embolism can cause shortness of breath, chest pain, and even shock. Other risks include urinary tract infection, nausea and vomiting (usually related to pain medication), chronic knee pain and stiffness, bleeding into the knee joint, nerve damage, blood vessel injury,  and infection of the knee which can require re-operation. Furthermore, the risks of anesthesia include potential heart, lung, kidney, and liver damage.

## 2019-11-14 NOTE — TELEPHONE ENCOUNTER
Explained findings to patient.  The patient expressed understanding.  The patient is scheduled for her Diagnostic mammogram and/or Breast Ultrasound.  Diagnostic Appointment Date/time    11-18-19@840a  A letter is being mailed to patient explaining findings and recommendations.

## 2019-11-14 NOTE — TELEPHONE ENCOUNTER
Pt informed of incomplete mammogram, orders placed for dx of left with ultrasound. All questions answered.

## 2019-11-14 NOTE — TELEPHONE ENCOUNTER
----- Message from Silva Eller MA sent at 11/14/2019  1:50 PM CST -----  Contact: Self      ----- Message -----  From: Deedee Feliberto  Sent: 11/14/2019   1:44 PM CST  To: [facility] Staff    Evy Mendoza  MRN: [id number]  Home Phone      [phone]  Work Phone      Not on file.  Mobile          [phone]    Patient Care Team:  Rakesh Devine MD as PCP - General (Internal Medicine)  Marisa Mcdowell MD (Obstetrics and Gynecology)  OB? No  What phone number can you be reached at? [phone]  Message:   Pt states she got a call about her mammogram needing to be redone. Was calling for clarification. Please advise.

## 2019-11-15 ENCOUNTER — TELEPHONE (OUTPATIENT)
Dept: PREADMISSION TESTING | Facility: HOSPITAL | Age: 74
End: 2019-11-15

## 2019-11-15 ENCOUNTER — TELEPHONE (OUTPATIENT)
Dept: ORTHOPEDICS | Facility: CLINIC | Age: 74
End: 2019-11-15

## 2019-11-15 DIAGNOSIS — M17.9 OSTEOARTHRITIS OF KNEE, UNSPECIFIED: Primary | ICD-10-CM

## 2019-11-15 DIAGNOSIS — M79.606 PAIN OF LOWER EXTREMITY, UNSPECIFIED LATERALITY: ICD-10-CM

## 2019-11-15 DIAGNOSIS — Z01.818 PRE-OP TESTING: Primary | ICD-10-CM

## 2019-11-15 NOTE — TELEPHONE ENCOUNTER
----- Message from Malinda Atkins LPN sent at 11/15/2019 10:40 AM CST -----  Surgery 12/9    Patient needs CBC,BMP,PT/INR,POC AND OPOC appt

## 2019-11-15 NOTE — TELEPHONE ENCOUNTER
We spoke  I asked if she could come in Friday Nov.22 to do her clearance with us   & do our class Tuesday Nov.26   She said yes to both    appt slips will be sent

## 2019-11-18 ENCOUNTER — TELEPHONE (OUTPATIENT)
Dept: OBSTETRICS AND GYNECOLOGY | Facility: CLINIC | Age: 74
End: 2019-11-18

## 2019-11-18 ENCOUNTER — HOSPITAL ENCOUNTER (OUTPATIENT)
Dept: RADIOLOGY | Facility: HOSPITAL | Age: 74
Discharge: HOME | End: 2019-11-18
Attending: OBSTETRICS & GYNECOLOGY
Payer: MEDICARE

## 2019-11-18 DIAGNOSIS — Z12.31 ENCOUNTER FOR SCREENING MAMMOGRAM FOR MALIGNANT NEOPLASM OF BREAST: ICD-10-CM

## 2019-11-18 DIAGNOSIS — R92.8 ABNORMAL MAMMOGRAM: ICD-10-CM

## 2019-11-18 DIAGNOSIS — Z12.39 BREAST CANCER SCREENING: Primary | ICD-10-CM

## 2019-11-18 PROCEDURE — 76642 US BREAST LEFT LIMITED: ICD-10-PCS | Mod: 26,LT,, | Performed by: RADIOLOGY

## 2019-11-18 PROCEDURE — 76642 ULTRASOUND BREAST LIMITED: CPT | Mod: TC,LT

## 2019-11-18 PROCEDURE — 76642 ULTRASOUND BREAST LIMITED: CPT | Mod: 26,LT,, | Performed by: RADIOLOGY

## 2019-11-18 NOTE — TELEPHONE ENCOUNTER
Patient notified that we will let her know as soon as we get her mammo results Dr. Del Rosario has not reviewed them as of yet.

## 2019-11-18 NOTE — TELEPHONE ENCOUNTER
----- Message from Silva Eller MA sent at 11/18/2019  2:37 PM CST -----  Contact: self      ----- Message -----  From: Guera Wiggins  Sent: 11/18/2019   2:27 PM CST  To: [facility]ias [facility] Staff    Evy Mendoza  MRN: [id number]  Home Phone      [phone]  Work Phone      Not on file.  Mobile          [phone]    Patient Care Team:  Rakesh Devine MD as PCP - General (Internal Medicine)  Marisa Mcdowell MD (Obstetrics and Gynecology)  OB? No  What phone number can you be reached at?[phone]   Message:   Pt would like mammogram results. Please return call.

## 2019-11-19 ENCOUNTER — TELEPHONE (OUTPATIENT)
Dept: OBSTETRICS AND GYNECOLOGY | Facility: CLINIC | Age: 74
End: 2019-11-19

## 2019-11-19 NOTE — TELEPHONE ENCOUNTER
----- Message from Mita Krishnan MA sent at 11/19/2019  4:31 PM CST -----  Contact: self      ----- Message -----  From: Guera Wiggins  Sent: 11/19/2019   4:26 PM CST  To: Jeremias [facility] Staff        ----- Message -----  From: Guera Wiggins  Sent: 11/18/2019   2:27 PM CST  To: Jeremias Cunningham Staff    Evy Mendoza  MRN: [id number]  Home Phone      [phone]  Work Phone      Not on file.  Mobile          [phone]    Patient Care Team:  Rakesh Devine MD as PCP - General (Internal Medicine)  Marisa Mcdowell MD (Obstetrics and Gynecology)  OB? No  What phone number can you be reached at?[phone]   Message:   Pt would like mammogram results. Please return call.

## 2019-11-19 NOTE — TELEPHONE ENCOUNTER
Pt called multiple times regarding mammo results for mammo that was done 11/18/19. Informed pt Dr Del Rosario was out of the clinic 11/18, and we will contact her with results once reviewed.

## 2019-11-21 ENCOUNTER — TELEPHONE (OUTPATIENT)
Dept: OBSTETRICS AND GYNECOLOGY | Facility: CLINIC | Age: 74
End: 2019-11-21

## 2019-11-21 NOTE — TELEPHONE ENCOUNTER
----- Message from Cindy Tejeda sent at 11/21/2019 11:55 AM CST -----  Contact: self      ----- Message -----  From: Yodit Solis MA  Sent: 11/21/2019  11:46 AM CST  To: [facility]ias [facility] Staff    Evy Mendoza  MRN: [id number]  Home Phone      [phone]  Work Phone      Not on file.  [employer]          [phone]    Patient Care Team:  Rakesh Devine MD as PCP - General (Internal Medicine)  Marisa Mcdowell MD (Obstetrics and Gynecology)  OB? No  What phone number can you be reached at?   [phone]  Message:   Would like ultrasound and dexa results.

## 2019-11-22 ENCOUNTER — OFFICE VISIT (OUTPATIENT)
Dept: ORTHOPEDICS | Facility: CLINIC | Age: 74
End: 2019-11-22
Payer: MEDICARE

## 2019-11-22 ENCOUNTER — LAB VISIT (OUTPATIENT)
Dept: LAB | Facility: HOSPITAL | Age: 74
End: 2019-11-22
Attending: ORTHOPAEDIC SURGERY
Payer: MEDICARE

## 2019-11-22 VITALS — HEIGHT: 63 IN | WEIGHT: 191.56 LBS | BODY MASS INDEX: 33.94 KG/M2

## 2019-11-22 DIAGNOSIS — M79.606 PAIN OF LOWER EXTREMITY, UNSPECIFIED LATERALITY: ICD-10-CM

## 2019-11-22 DIAGNOSIS — M17.9 OSTEOARTHRITIS OF KNEE, UNSPECIFIED: ICD-10-CM

## 2019-11-22 DIAGNOSIS — Z96.659 S/P KNEE REPLACEMENT: ICD-10-CM

## 2019-11-22 DIAGNOSIS — Z01.818 PRE-OP TESTING: ICD-10-CM

## 2019-11-22 DIAGNOSIS — M17.12 PRIMARY OSTEOARTHRITIS OF LEFT KNEE: Primary | ICD-10-CM

## 2019-11-22 LAB
ANION GAP SERPL CALC-SCNC: 8 MMOL/L (ref 8–16)
BUN SERPL-MCNC: 23 MG/DL (ref 8–23)
CALCIUM SERPL-MCNC: 9.6 MG/DL (ref 8.7–10.5)
CHLORIDE SERPL-SCNC: 104 MMOL/L (ref 95–110)
CO2 SERPL-SCNC: 29 MMOL/L (ref 23–29)
CREAT SERPL-MCNC: 0.9 MG/DL (ref 0.5–1.4)
CRP SERPL-MCNC: 2.8 MG/L (ref 0–8.2)
ERYTHROCYTE [DISTWIDTH] IN BLOOD BY AUTOMATED COUNT: 12.2 % (ref 11.5–14.5)
ERYTHROCYTE [SEDIMENTATION RATE] IN BLOOD BY WESTERGREN METHOD: 11 MM/HR (ref 0–36)
EST. GFR  (AFRICAN AMERICAN): >60 ML/MIN/1.73 M^2
EST. GFR  (NON AFRICAN AMERICAN): >60 ML/MIN/1.73 M^2
GLUCOSE SERPL-MCNC: 99 MG/DL (ref 70–110)
HCT VFR BLD AUTO: 41.6 % (ref 37–48.5)
HGB BLD-MCNC: 13.4 G/DL (ref 12–16)
INR PPP: 1 (ref 0.8–1.2)
MCH RBC QN AUTO: 30.9 PG (ref 27–31)
MCHC RBC AUTO-ENTMCNC: 32.2 G/DL (ref 32–36)
MCV RBC AUTO: 96 FL (ref 82–98)
PLATELET # BLD AUTO: 251 K/UL (ref 150–350)
PMV BLD AUTO: 8.8 FL (ref 9.2–12.9)
POTASSIUM SERPL-SCNC: 4.1 MMOL/L (ref 3.5–5.1)
PROTHROMBIN TIME: 10.3 SEC (ref 9–12.5)
RBC # BLD AUTO: 4.34 M/UL (ref 4–5.4)
SODIUM SERPL-SCNC: 141 MMOL/L (ref 136–145)
WBC # BLD AUTO: 5.86 K/UL (ref 3.9–12.7)

## 2019-11-22 PROCEDURE — 85027 COMPLETE CBC AUTOMATED: CPT

## 2019-11-22 PROCEDURE — 99999 PR PBB SHADOW E&M-EST. PATIENT-LVL III: CPT | Mod: PBBFAC,,, | Performed by: NURSE PRACTITIONER

## 2019-11-22 PROCEDURE — 99499 NO LOS: ICD-10-PCS | Mod: S$GLB,,, | Performed by: NURSE PRACTITIONER

## 2019-11-22 PROCEDURE — 99499 UNLISTED E&M SERVICE: CPT | Mod: S$GLB,,, | Performed by: NURSE PRACTITIONER

## 2019-11-22 PROCEDURE — 85652 RBC SED RATE AUTOMATED: CPT

## 2019-11-22 PROCEDURE — 80048 BASIC METABOLIC PNL TOTAL CA: CPT

## 2019-11-22 PROCEDURE — 36415 COLL VENOUS BLD VENIPUNCTURE: CPT

## 2019-11-22 PROCEDURE — 86140 C-REACTIVE PROTEIN: CPT

## 2019-11-22 PROCEDURE — 85610 PROTHROMBIN TIME: CPT

## 2019-11-22 PROCEDURE — 99999 PR PBB SHADOW E&M-EST. PATIENT-LVL III: ICD-10-PCS | Mod: PBBFAC,,, | Performed by: NURSE PRACTITIONER

## 2019-11-24 RX ORDER — PREGABALIN 25 MG/1
75 CAPSULE ORAL NIGHTLY
Status: CANCELLED | OUTPATIENT
Start: 2019-11-24

## 2019-11-24 RX ORDER — SODIUM CHLORIDE 9 MG/ML
INJECTION, SOLUTION INTRAVENOUS CONTINUOUS
Status: CANCELLED | OUTPATIENT
Start: 2019-11-24 | End: 2019-11-25

## 2019-11-24 RX ORDER — ONDANSETRON 2 MG/ML
4 INJECTION INTRAMUSCULAR; INTRAVENOUS EVERY 8 HOURS PRN
Status: CANCELLED | OUTPATIENT
Start: 2019-11-24

## 2019-11-24 RX ORDER — OXYCODONE HYDROCHLORIDE 5 MG/1
10 TABLET ORAL
Status: CANCELLED | OUTPATIENT
Start: 2019-11-24

## 2019-11-24 RX ORDER — MORPHINE SULFATE 10 MG/ML
2 INJECTION, SOLUTION INTRAMUSCULAR; INTRAVENOUS
Status: CANCELLED | OUTPATIENT
Start: 2019-11-24

## 2019-11-24 RX ORDER — TALC
6 POWDER (GRAM) TOPICAL NIGHTLY PRN
Status: CANCELLED | OUTPATIENT
Start: 2019-11-24

## 2019-11-24 RX ORDER — ASPIRIN 81 MG/1
81 TABLET ORAL 2 TIMES DAILY
Status: CANCELLED | OUTPATIENT
Start: 2019-11-24

## 2019-11-24 RX ORDER — OXYCODONE HYDROCHLORIDE 5 MG/1
5 TABLET ORAL
Status: CANCELLED | OUTPATIENT
Start: 2019-11-24

## 2019-11-24 RX ORDER — PREGABALIN 25 MG/1
75 CAPSULE ORAL
Status: CANCELLED | OUTPATIENT
Start: 2019-11-24

## 2019-11-24 RX ORDER — MIDAZOLAM HYDROCHLORIDE 1 MG/ML
1 INJECTION INTRAMUSCULAR; INTRAVENOUS EVERY 5 MIN PRN
Status: CANCELLED | OUTPATIENT
Start: 2019-11-24

## 2019-11-24 RX ORDER — FAMOTIDINE 20 MG/1
20 TABLET, FILM COATED ORAL 2 TIMES DAILY
Status: CANCELLED | OUTPATIENT
Start: 2019-11-24

## 2019-11-24 RX ORDER — NALOXONE HCL 0.4 MG/ML
0.02 VIAL (ML) INJECTION
Status: CANCELLED | OUTPATIENT
Start: 2019-11-24

## 2019-11-24 RX ORDER — ACETAMINOPHEN 500 MG
1000 TABLET ORAL EVERY 6 HOURS
Status: CANCELLED | OUTPATIENT
Start: 2019-11-24 | End: 2019-11-26

## 2019-11-24 RX ORDER — OXYCODONE HYDROCHLORIDE 5 MG/1
15 TABLET ORAL
Status: CANCELLED | OUTPATIENT
Start: 2019-11-24

## 2019-11-24 RX ORDER — POLYETHYLENE GLYCOL 3350 17 G/17G
17 POWDER, FOR SOLUTION ORAL DAILY
Status: CANCELLED | OUTPATIENT
Start: 2019-11-25

## 2019-11-24 RX ORDER — ROPIVACAINE HYDROCHLORIDE 2 MG/ML
8 INJECTION, SOLUTION EPIDURAL; INFILTRATION; PERINEURAL CONTINUOUS
Status: CANCELLED | OUTPATIENT
Start: 2019-11-24

## 2019-11-24 RX ORDER — MUPIROCIN 20 MG/G
1 OINTMENT TOPICAL 2 TIMES DAILY
Status: CANCELLED | OUTPATIENT
Start: 2019-11-24 | End: 2019-11-29

## 2019-11-24 RX ORDER — BISACODYL 10 MG
10 SUPPOSITORY, RECTAL RECTAL EVERY 12 HOURS PRN
Status: CANCELLED | OUTPATIENT
Start: 2019-11-24

## 2019-11-24 RX ORDER — SODIUM CHLORIDE 9 MG/ML
INJECTION, SOLUTION INTRAVENOUS
Status: CANCELLED | OUTPATIENT
Start: 2019-11-24

## 2019-11-24 RX ORDER — CELECOXIB 100 MG/1
200 CAPSULE ORAL DAILY
Status: CANCELLED | OUTPATIENT
Start: 2019-11-25

## 2019-11-24 RX ORDER — LIDOCAINE HYDROCHLORIDE 10 MG/ML
1 INJECTION, SOLUTION EPIDURAL; INFILTRATION; INTRACAUDAL; PERINEURAL
Status: CANCELLED | OUTPATIENT
Start: 2019-11-24

## 2019-11-24 RX ORDER — CELECOXIB 100 MG/1
400 CAPSULE ORAL
Status: CANCELLED | OUTPATIENT
Start: 2019-11-24

## 2019-11-24 RX ORDER — AMOXICILLIN 250 MG
1 CAPSULE ORAL 2 TIMES DAILY
Status: CANCELLED | OUTPATIENT
Start: 2019-11-24

## 2019-11-24 RX ORDER — FENTANYL CITRATE 50 UG/ML
25 INJECTION, SOLUTION INTRAMUSCULAR; INTRAVENOUS EVERY 5 MIN PRN
Status: CANCELLED | OUTPATIENT
Start: 2019-11-24

## 2019-11-24 RX ORDER — MUPIROCIN 20 MG/G
1 OINTMENT TOPICAL
Status: CANCELLED | OUTPATIENT
Start: 2019-11-24

## 2019-11-24 RX ORDER — SODIUM CHLORIDE 0.9 % (FLUSH) 0.9 %
10 SYRINGE (ML) INJECTION
Status: CANCELLED | OUTPATIENT
Start: 2019-11-24

## 2019-11-25 NOTE — H&P
CC: Left knee pain    Evy Mendoza is a 74 y.o. female with a history of Left knee pain. Pain is worse with activity and weight bearing.  Patient has experienced interference of activities of daily living due to decreased range of motion and an increase in joint pain and swelling.  Patient has failed non-operative treatment including NSAIDs, corticosteroid injections, viscosupplement injections, and activity modification.  Evy Mendoza currently ambulates independently.     Relevant medical conditions of significance in perioperative period:  HTN- on medication managed by pcp    Past Medical History:   Diagnosis Date    Arthritis     Cataract     Fibrocystic breast     Hyperlipidemia     Hypertension     Interstitial cystitis     Osteoporosis        Past Surgical History:   Procedure Laterality Date    Benign Tumor Removed from neck      BREAST CYST ASPIRATION Left     COLONOSCOPY  8/2013    EYE SURGERY      KNEE SURGERY      X3    Tonsillectomy      TUBAL LIGATION         Family History   Problem Relation Age of Onset    Diabetes Maternal Grandfather     Prostate cancer Father     Diabetes Mother     Lung cancer Mother     Diabetes Brother     Breast cancer Neg Hx     Ovarian cancer Neg Hx     Colon cancer Neg Hx        Review of patient's allergies indicates:   Allergen Reactions    No known drug allergies          Current Outpatient Medications:     ACETAMINOPHEN (TYLENOL 8 HOUR ORAL), Take 1 tablet by mouth daily as needed., Disp: , Rfl:     albuterol (PROVENTIL) 2.5 mg /3 mL (0.083 %) nebulizer solution, INHALE THE CONTENTS OF 1 VIAL VIA NEBULIZER FOUR TIMES A DAY AS NEEDED FOR WHEEZING, Disp: , Rfl: 0    alendronate (FOSAMAX) 70 MG tablet, Take 1 tablet (70 mg total) by mouth every 7 days., Disp: 12 tablet, Rfl: 3    calcium-vitamin D3 (CALCIUM 500 + D) 500 mg(1,250mg) -200 unit per tablet, Take 1 tablet by mouth once daily at 6am. , Disp: , Rfl:     carBAMazepine  (TEGRETOL) 200 mg tablet, TAKE 1 TABLET TWICE DAILY, Disp: 180 tablet, Rfl: 3    cetirizine (ZYRTEC) 10 MG tablet, Take 1 tablet by mouth continuous prn. , Disp: , Rfl:     ciclopirox (PENLAC) 8 % Soln, APPLY TOPICALLY NIGHTLY., Disp: 3 mL, Rfl: 0    cyanocobalamin 500 MCG tablet, Take 500 mcg by mouth once daily., Disp: , Rfl:     docusate sodium (COLACE) 100 MG capsule, Take 100 mg by mouth once daily., Disp: , Rfl:     ELMIRON 100 mg Cap, TAKE 1 CAPSULE THREE TIMES DAILY, Disp: 270 capsule, Rfl: 3    ezetimibe (ZETIA) 10 mg tablet, Take 1 tablet (10 mg total) by mouth once daily., Disp: 90 tablet, Rfl: 3    fish oil-omega-3 fatty acids 300-1,000 mg capsule, Take 1 g by mouth once daily. , Disp: , Rfl:     FLUAD [id number], 65 YR UP,,PF, 45 mcg (15 mcg x 3)/0.5 mL Syrg, ADM 0.5ML IM UTD, Disp: , Rfl: 0    fluticasone (FLONASE) 50 mcg/actuation nasal spray, 1 spray by Nasal route continuous prn. , Disp: , Rfl:     gabapentin (NEURONTIN) 300 MG capsule, Take 1 capsule (300 mg total) by mouth every evening., Disp: 30 capsule, Rfl: 0    glucosamine-chondroitin 500-400 mg tablet, Take 1 tablet by mouth once daily at 6am. , Disp: , Rfl:     lidocaine (LIDODERM) 5 %, Place 1 patch onto the skin once daily. Remove & Discard patch within 12 hours or as directed by MD, Disp: 14 patch, Rfl: 0    lidocaine-prilocaine (EMLA) cream, Apply topically 2 (two) times daily as needed., Disp: 30 g, Rfl: 3    losartan-hydrochlorothiazide 50-12.5 mg (HYZAAR) 50-12.5 mg per tablet, TAKE 1 TABLET EVERY DAY, Disp: 90 tablet, Rfl: 3    metoprolol succinate (TOPROL-XL) 50 MG 24 hr tablet, TAKE 1/2 TABLET (25MG) BY MOUTH ONE TIME DAILY , Disp: 45 tablet, Rfl: 3    multivitamin (ONE DAILY MULTIVITAMIN) per tablet, Take 1 tablet by mouth once daily., Disp: , Rfl:     mupirocin (BACTROBAN) 2 % ointment, Apply topically 3 (three) times daily., Disp: 15 g, Rfl: 0    oxyquinoline-sod.lauryl sulfat (TRIMO-GRIGGS JELLY) 0.025-0.01 %  "Gel, Insert vaginally nightly three times per week., Disp: 113.4 g, Rfl: 2    PREMPRO 0.3-1.5 mg per tablet, Take 1 tablet by mouth once daily., Disp: 90 tablet, Rfl: 3    RABEprazole (ACIPHEX) 20 mg tablet, TAKE 1 TABLET EVERY DAY, Disp: 90 tablet, Rfl: 3    SHINGRIX, PF, 50 mcg/0.5 mL injection, ADM 0.5ML IM UTD, Disp: , Rfl: 0    simvastatin (ZOCOR) 20 MG tablet, Take 1 tablet (20 mg total) by mouth every evening., Disp: 90 tablet, Rfl: 3    traMADol (ULTRAM) 50 mg tablet, TAKE 1 TABLET TWICE DAILY, Disp: 60 tablet, Rfl: 0    Review of Systems:  Constitutional: no fever or chills  Eyes: no visual changes  ENT: no nasal congestion or sore throat  Respiratory: no cough or shortness of breath  Cardiovascular: no chest pain or palpitations  Gastrointestinal: no nausea or vomiting, tolerating diet  Genitourinary: no hematuria or dysuria  Integument/Breast: no rash or pruritis  Hematologic/Lymphatic: no easy bruising or lymphadenopathy  Musculoskeletal: positive for c/o left knee pain worse with increased activity  Neurological: no seizures or tremors  Behavioral/Psych: no auditory or visual hallucinations  Endocrine: no heat or cold intolerance    PE:  Ht 5' 3" (1.6 m)   Wt 86.9 kg (191 lb 9.3 oz)   BMI 33.94 kg/m²   General: Pleasant, cooperative, NAD   Gait: antalgic  HEENT: NCAT, sclera nonicteric   Lungs: Respirations clear bilaterally; equal and unlabored.   CV: S1S2; 2+ bilateral upper and lower extremity pulses.   Skin: Intact throughout with no rashes, erythema, or lesions  Extremities: No LE edema,  no erythema or warmth of the skin in either lower extremity.    Left knee exam:  Knee Range of Motion:normal, pain with passive range of motion  Effusion:none  Condition of skin:intact  Location of tenderness:Medial joint line   Strength:normal  Stability: stable to testing    Hip Examination:normal    Radiographs: Radiographs reveal Left knee: There is mild-moderate DJD and a valgus deformity.  No fracture " dislocation bone destruction seen.    Knee Alignment:  Moderate valgus    Diagnosis: osteoarthritis Left knee    Plan: Left total knee arthroplasty    Due to the serious nature of total joint infection and the high prevalence of community acquired MRSA, vancomycin will be used perioperatively.

## 2019-11-25 NOTE — PROGRESS NOTES
Evy Mendoza is a 74 y.o. year old here today for a pre-operative visit in preparation for a Left total knee arthroplasty to be performed by  Dr. [facility] on 12/19/19.  she was last seen and treated in the clinic on 11/14/2019. she will be medically optimized by the pre op center. There has been no significant change in medical status since last visit. No fever, chills, malaise, or unexplained weight change.      Allergies, Medications, past medical and surgical history reviewed.    Focused examination performed.    Dr. [facility] saw this patient today in clinic. All questions answered. Patient encouraged to call with questions. Contact information given.     Pre, ronna, and post operative procedures and expectations discussed. Questions were answered. Evy Mendoza has been educated and is ready to proceed with surgery. Approximately 30 minutes was spent discussing surgical outcomes, plans, procedures pre, ronna, and post operative expections and care.  Surgical consent signed.    Evy Mendoza will contact us if there are any questions, concerns, or changes in medical status prior to surgery.

## 2019-11-27 ENCOUNTER — TELEPHONE (OUTPATIENT)
Dept: ORTHOPEDICS | Facility: CLINIC | Age: 74
End: 2019-11-27

## 2019-11-27 NOTE — TELEPHONE ENCOUNTER
----- Message from James Powers sent at 11/27/2019  8:40 AM CST -----  Contact: pt   Please call pt at [phone]    Patient has questions regarding future surgery    Thank you

## 2019-11-27 NOTE — TELEPHONE ENCOUNTER
----- Message from James Powers sent at 11/27/2019  8:40 AM CST -----  Contact: pt   Please call pt at [phone]    Patient has questions regarding future surgery    Thank you

## 2019-11-27 NOTE — TELEPHONE ENCOUNTER
----- Message from James Powers sent at 11/27/2019  8:40 AM CST -----  Contact: pt   Please call pt at [phone]    Patient has questions regarding future surgery    Thank you          No answer  I left a message to call me back

## 2019-11-28 NOTE — ANESTHESIA PAT ROS NOTE
11/27/2019  Evy Mendoza is a 74 y.o., female.      Pre-op Assessment         Review of Systems         Anesthesia Assessment: Preoperative EQUATION    Planned Procedure: Procedure(s) (LRB):  ARTHROPLASTY, KNEE, TOTAL (Left)  Requested Anesthesia Type:Femoral Block  Surgeon: [facility] MD  Service: Orthopedics  Known or anticipated Date of Surgery:12/9/2019    Surgeon notes: reviewed    Previous anesthesia records:Not available    Last PCP note: within [facility] , Dr Abou-Braydon 10/23/19    Other important co-morbidities: HLP and HTN      Tests already available:  No recent tests.               Plan:    Testing:  BMP, EKG, Hematology Profile and PT/INR-completed labs 11/22/19   Pre-anesthesia  visit       Visit focus: possible regional anesthesia and/or nerve block      Consultation:IM Perioperative Hospitalist       Navigation: Tests Scheduled-(EKG)             Consults scheduled.             Results will be tracked by Preop Clinic.

## 2019-12-02 ENCOUNTER — HOSPITAL ENCOUNTER (OUTPATIENT)
Dept: CARDIOLOGY | Facility: CLINIC | Age: 74
Discharge: HOME | End: 2019-12-02
Payer: MEDICARE

## 2019-12-02 ENCOUNTER — HOSPITAL ENCOUNTER (OUTPATIENT)
Dept: PREADMISSION TESTING | Facility: HOSPITAL | Age: 74
Discharge: HOME | End: 2019-12-02
Attending: ANESTHESIOLOGY
Payer: MEDICARE

## 2019-12-02 ENCOUNTER — INITIAL CONSULT (OUTPATIENT)
Dept: INTERNAL MEDICINE | Facility: CLINIC | Age: 74
End: 2019-12-02
Payer: MEDICARE

## 2019-12-02 VITALS
HEART RATE: 80 BPM | DIASTOLIC BLOOD PRESSURE: 82 MMHG | OXYGEN SATURATION: 97 % | HEIGHT: 63 IN | BODY MASS INDEX: 33.73 KG/M2 | WEIGHT: 190.38 LBS | SYSTOLIC BLOOD PRESSURE: 130 MMHG

## 2019-12-02 DIAGNOSIS — Z01.818 PRE-OP TESTING: ICD-10-CM

## 2019-12-02 DIAGNOSIS — R94.31 ABNORMAL EKG: ICD-10-CM

## 2019-12-02 DIAGNOSIS — E78.00 HYPERCHOLESTEREMIA: Chronic | ICD-10-CM

## 2019-12-02 DIAGNOSIS — Z98.890 PERSONAL HISTORY OF SPINE SURGERY: ICD-10-CM

## 2019-12-02 DIAGNOSIS — Z01.818 PREOP EXAMINATION: Primary | ICD-10-CM

## 2019-12-02 DIAGNOSIS — I10 ESSENTIAL (PRIMARY) HYPERTENSION: Chronic | ICD-10-CM

## 2019-12-02 DIAGNOSIS — N81.10 FEMALE BLADDER PROLAPSE: ICD-10-CM

## 2019-12-02 DIAGNOSIS — Z79.890 HORMONE REPLACEMENT THERAPY (HRT): ICD-10-CM

## 2019-12-02 DIAGNOSIS — K21.9 GASTROESOPHAGEAL REFLUX DISEASE, ESOPHAGITIS PRESENCE NOT SPECIFIED: ICD-10-CM

## 2019-12-02 DIAGNOSIS — B35.1 ONYCHOMYCOSIS: ICD-10-CM

## 2019-12-02 DIAGNOSIS — Z82.49 FAMILY HISTORY OF THROMBOSIS: ICD-10-CM

## 2019-12-02 DIAGNOSIS — R11.2 NAUSEA AND VOMITING, INTRACTABILITY OF VOMITING NOT SPECIFIED, UNSPECIFIED VOMITING TYPE: ICD-10-CM

## 2019-12-02 DIAGNOSIS — N30.10 INTERSTITIAL CYSTITIS: ICD-10-CM

## 2019-12-02 DIAGNOSIS — G47.30 SLEEP APNEA, UNSPECIFIED TYPE: ICD-10-CM

## 2019-12-02 DIAGNOSIS — G24.3 CERVICAL DYSTONIA: ICD-10-CM

## 2019-12-02 DIAGNOSIS — G50.0 TRIGEMINAL NEURALGIA: Chronic | ICD-10-CM

## 2019-12-02 DIAGNOSIS — K59.04 CHRONIC IDIOPATHIC CONSTIPATION: Chronic | ICD-10-CM

## 2019-12-02 DIAGNOSIS — Z83.3 FAMILY HISTORY OF DIABETES MELLITUS: ICD-10-CM

## 2019-12-02 DIAGNOSIS — Z91.018 MULTIPLE FOOD ALLERGIES: ICD-10-CM

## 2019-12-02 PROCEDURE — 1101F PT FALLS ASSESS-DOCD LE1/YR: CPT | Mod: CPTII,S$GLB,, | Performed by: HOSPITALIST

## 2019-12-02 PROCEDURE — 93005 EKG 12-LEAD: ICD-10-PCS | Mod: S$GLB,,, | Performed by: ORTHOPAEDIC SURGERY

## 2019-12-02 PROCEDURE — 1125F AMNT PAIN NOTED PAIN PRSNT: CPT | Mod: S$GLB,,, | Performed by: HOSPITALIST

## 2019-12-02 PROCEDURE — 1125F PR PAIN SEVERITY QUANTIFIED, PAIN PRESENT: ICD-10-PCS | Mod: S$GLB,,, | Performed by: HOSPITALIST

## 2019-12-02 PROCEDURE — 93005 ELECTROCARDIOGRAM TRACING: CPT | Mod: S$GLB,,, | Performed by: ORTHOPAEDIC SURGERY

## 2019-12-02 PROCEDURE — 99214 PR OFFICE/OUTPT VISIT, EST, LEVL IV, 30-39 MIN: ICD-10-PCS | Mod: S$GLB,,, | Performed by: HOSPITALIST

## 2019-12-02 PROCEDURE — 99999 PR PBB SHADOW E&M-EST. PATIENT-LVL III: ICD-10-PCS | Mod: PBBFAC,,, | Performed by: HOSPITALIST

## 2019-12-02 PROCEDURE — 99214 OFFICE O/P EST MOD 30 MIN: CPT | Mod: S$GLB,,, | Performed by: HOSPITALIST

## 2019-12-02 PROCEDURE — 93010 EKG 12-LEAD: ICD-10-PCS | Mod: S$GLB,,, | Performed by: INTERNAL MEDICINE

## 2019-12-02 PROCEDURE — 1159F MED LIST DOCD IN RCRD: CPT | Mod: S$GLB,,, | Performed by: HOSPITALIST

## 2019-12-02 PROCEDURE — 1159F PR MEDICATION LIST DOCUMENTED IN MEDICAL RECORD: ICD-10-PCS | Mod: S$GLB,,, | Performed by: HOSPITALIST

## 2019-12-02 PROCEDURE — 3075F PR MOST RECENT SYSTOLIC BLOOD PRESS GE 130-139MM HG: ICD-10-PCS | Mod: CPTII,S$GLB,, | Performed by: HOSPITALIST

## 2019-12-02 PROCEDURE — 3079F DIAST BP 80-89 MM HG: CPT | Mod: CPTII,S$GLB,, | Performed by: HOSPITALIST

## 2019-12-02 PROCEDURE — 3079F PR MOST RECENT DIASTOLIC BLOOD PRESSURE 80-89 MM HG: ICD-10-PCS | Mod: CPTII,S$GLB,, | Performed by: HOSPITALIST

## 2019-12-02 PROCEDURE — 99999 PR PBB SHADOW E&M-EST. PATIENT-LVL III: CPT | Mod: PBBFAC,,, | Performed by: HOSPITALIST

## 2019-12-02 PROCEDURE — 3075F SYST BP GE 130 - 139MM HG: CPT | Mod: CPTII,S$GLB,, | Performed by: HOSPITALIST

## 2019-12-02 PROCEDURE — 1101F PR PT FALLS ASSESS DOC 0-1 FALLS W/OUT INJ PAST YR: ICD-10-PCS | Mod: CPTII,S$GLB,, | Performed by: HOSPITALIST

## 2019-12-02 PROCEDURE — 93010 ELECTROCARDIOGRAM REPORT: CPT | Mod: S$GLB,,, | Performed by: INTERNAL MEDICINE

## 2019-12-02 RX ORDER — ACETAMINOPHEN 325 MG/1
325 TABLET ORAL EVERY 6 HOURS PRN
COMMUNITY

## 2019-12-02 RX ORDER — GUAIFENESIN AND PHENYLEPHRINE HCL 400; 10 MG/1; MG/1
TABLET ORAL
COMMUNITY

## 2019-12-02 NOTE — ASSESSMENT & PLAN NOTE
Still has uterus  For hot flashes , joint pains    On hormone replacement therapy   Risk / benefits ofhormone replacement therpay   use in the perioperative period discussed   Risk of thrombosis discussed with hormone replacement therpay  use  Preferable to hold hormone replacement therpay 1-2 weeks pre op until  Mobility returns to base line     Suggested discussing with Gynecologist about his    Risk of withdrawal vaginal bleeding discussed

## 2019-12-02 NOTE — ASSESSMENT & PLAN NOTE
Tegretol helps   I suggest monitoring the sodium as SIADH from Tegretol   use and hypersecretion of ADH associated with surgery can reduce sodium in the perioperative period

## 2019-12-02 NOTE — ASSESSMENT & PLAN NOTE
Toprol xl  Losartan-HCTZ    Home BP readings -120/80's  Recent BP readings in the record-120/80's  Hypertension-  Blood pressure is acceptable . I suggest continuation of  Toprol xl - during the entire perioperative period. I suggest holding   Losartan-HCTZ- on the morning of the surgery and can continue that  post operatively under blood pressure, electrolyte and renal function monitoring as long as they are acceptable.I suggest addressing pain control as uncontrolled pain can increased blood pressure

## 2019-12-02 NOTE — HPI
"History of present illness- I had the pleasure of meeting this pleasant 74 y.o. lady in the pre op clinic prior to her elective Orthopedic surgery. The patient is new to me . Goes by "James " was accompanied by  Brent .    I have obtained the history by speaking to the patient and by reviewing the electronic health records.    Events leading up to surgery / History of presenting illness -    She has been troubled with moderate-severe  Left knee  pain for 6 months . Pain increases with activity and decreases with resting.Tramadol, Tylenol    Had 2 meniscus surgeries in the past ( long time ago , could not recollect the timing )     Relevant health conditions of significance for the perioperative period/ History of presenting illness -    Health conditions of significance for the perioperative period     HTN    HRT    Interstitial cystitis     Not known to have heart disease , Diabetes Mellitus, Lung disease     Lives with  who can help   Single level house   Walk in tub , handicap commodes  "

## 2019-12-02 NOTE — ASSESSMENT & PLAN NOTE
Has limitation of neck movements on both sides   Gets Botox injections every 3 months  Last procedure Oct 2019   Unlikely to require Endo tracheal intubation

## 2019-12-02 NOTE — ASSESSMENT & PLAN NOTE
Left great toe   Off Lamisil for 2 years   Uses Ciclopirox, Neosporin   suggested not to pick on with a probe for ingrowing toe tail   No suggestion of cellulitis for bacterial fever infection - Left foot

## 2019-12-02 NOTE — ANESTHESIA PAT ROS NOTE
12/02/2019  Evy Mendoza is a 74 y.o., female.      Pre-op Assessment      I have reviewed the Medications.   Steroids Taken In Past Year:     Review of Systems  Anesthesia Hx:  History of prior surgery of interest to airway management or planning: Previous anesthesia: MAC  cATARACT sURGERY: 6/2019 with MAC.  Denies Family Hx of Anesthesia complications.   Denies Personal Hx of Anesthesia complications.   Social:  Denies Tobacco Use. Denies Alcohol Use.   Hematology/Oncology:  Hematology Normal   Oncology Normal     EENT/Dental:EENT/Dental Normal   Cardiovascular:  Functional Capacity good / => 4 METS, rides stationary bike x 5 miles daily: denies CP/SOB  Denies Coronary Artery Disease.  Denies Deep Venous Thrombosis (DVT)  Hypertension , Recent typical clinic B/P of 130/82 @ POC visit    Pulmonary:  Denies Asthma.  Denies Chronic Obstructive Pulmonary Disease (COPD).  Obstructive Sleep Apnea (COLTEN), CPAP used.   Renal/:   Bladder prolapse Other Renal / Gu Conditions: Interstitial Cystitis (Has Pessary)  Hepatic/GI:  Hepatic/GI Symptoms: constipation.  Esophageal / Stomach Disorders Gerd Controlled by chronic antireflux medication.  Hernia, Hiatal Hernia Denies Liver Disease    Musculoskeletal:   Cervical dystonia  S/P Benign tumor removed from neck    Spinal Stenosis- lumbar Bone Disorders: Osteoporosis  Spine Disorders: lumbar    Neurological:  Neurology Normal  Denies Pain  Pain Etiology/Diagnosis , secondary to Trigeminal Neuralgia    Endocrine:  Denies Diabetes  Denies Thyroid Disease  Metabolic Disorders, Hyperlipoproteinemia, hypercholesterolemia      Physical Exam  General:  Obesity    Airway/Jaw/Neck:  Airway Findings: Mouth Opening: Normal Jaw/Neck Findings:  Neck ROM: Decreased Lateral Motion, to the right, to the left      Dental:  Dental Findings: Lower Dentures        Mental  Status:  Mental Status Findings:  Cooperative, Alert and Oriented         The patient was seen by Perioperative Internal Medicine physician Dr. Hernandez on 12/2/19 , please see recommendations.    EKG pending    Discharge Plan: To home with  (Brent: [phone])      Jennifer Zhang RN

## 2019-12-02 NOTE — PROGRESS NOTES
"[facility] - Pre Op Consult  Progress Note    Patient Name: Evy Mendoza  MRN: [id number]  Date of Evaluation- 12/08/2019  PCP- Rakesh Devine MD    Future cases for James Mendoza [[id number]]     Case ID Status Date Time [place] Procedure Provider Location    [id number] [facility] 12/9/2019  1:01  ARTHROPLASTY, KNEE, TOTAL [facility] [[id number]] [facility] OR        Left   HPI:  History of present illness- I had the pleasure of meeting this pleasant 74 y.o. lady in the pre op clinic prior to her elective Orthopedic surgery. The patient is new to me . Goes by "James " was accompanied by  Brent .    I have obtained the history by speaking to the patient and by reviewing the electronic health records.    Events leading up to surgery / History of presenting illness -    She has been troubled with moderate-severe  Left knee  pain for 6 months . Pain increases with activity and decreases with resting.Tramadol, Tylenol    Had 2 meniscus surgeries in the past ( long time ago , could not recollect the timing )     Relevant health conditions of significance for the perioperative period/ History of presenting illness -    Health conditions of significance for the perioperative period     HTN    HRT    Interstitial cystitis     Not known to have heart disease , Diabetes Mellitus, Lung disease     Lives with  who can help   Single level house   Walk in tub , handicap commodes      Subjective/ Objective:       Chief complaint-Preoperative evaluation, Perioperative Medical management, complication reduction plan     Active cardiac conditions- none    Revised cardiac risk index predictors- none    Functional capacity -Examples of physical activity, does stretching, can ride a stationary bike for 25 minutes, does vacuuming, walks on the [employer],    house work and can take 1 flight of stairs----- She can undertake all the above activities without  chest pain,chest tightness, Shortness of breath " ",dizziness,lightheadedness making her exercise tolerance more,   than 4 Mets.       Review of Systems   Constitutional: Negative for chills and fever.            Weight gain from reduced activity   HENT:        Sleep apnea   Eyes:        No sudden visual changes   Respiratory:        No cough , phlegm    No Hemoptysis   Cardiovascular:        As noted   Gastrointestinal:        No overt GI/ blood losses  Bowel movements- once in 2-3 days- long standing     Endocrine:        Prednisone use > 20 mg daily for 3 weeks- none    Genitourinary: Negative for dysuria.        No urinary hesitancy    Musculoskeletal:        As above      Skin: Negative for rash.   Neurological: Negative for syncope.        No unilateral weakness   Hematological:        Current use of Anticoagulants  None    Psychiatric/Behavioral:        No Depression,Anxiety       No vascular stenting           No anesthesia, bleeding, cardiac problems with previous surgeries/procedures.  Medications and Allergies reviewed in epic.   FH- No anesthesia,bleeding  , early onset heart disease in family     Physical Exam  Blood pressure 130/82, pulse 80, height 5' 3" (1.6 m), weight 86.4 kg (190 lb 6.4 oz), SpO2 97 %.      Physical Exam  Constitutional- Vitals - Body mass index is 33.73 kg/m².,   Vitals:    12/02/19 1407   BP: 130/82   Pulse: 80     General appearance-Conscious,Coherent  Eyes- No conjunctival icterus,pupils  round , reactive to light  and  bilateral intra ocular lenses  ENT-Oral cavity- moist  and lower partial denture  , Hearing grossly normal   Neck- No thyromegaly ,Trachea -central, No jugular venous distension,   No Carotid Bruit   Cardiovascular -Heart Sounds- Normal  and  no murmur   , No gallop rhythm   Respiratory - Normal Respiratory Effort, Normal breath sounds, crepitations bases,  no wheeze  and  no forced expiratory wheeze    Peripheral pitting pedal edema-- none , no calf pain   Gastrointestinal -Soft abdomen, No palpable masses, " Non Tender,Liver,Spleen not palpable. No-- free fluid and shifting dullness  Musculoskeletal- No finger Clubbing. Strength grossly normal   Lymphatic-No Palpable cervical, axillary,Inguinal lymphadenopathy   Psychiatric - normal effect,Orientation  Rt Dorsalis pedis pulses-palpable    Lt Dorsalis pedis pulses- palpable   Rt Posterior tibial pulses -palpable   Left posterior tibial pulses -palpable   Miscellaneous -  Surgical scarposterior neck ,  no renal bruit and osteoarthritic nodes   Investigations  Lab and Imaging have been reviewed in epic.    Review of Medicine tests    EKG-pending     Review of clinical lab tests:  Lab Results   Component Value Date    CREATININE 0.9 11/22/2019    HGB 13.4 11/22/2019     11/22/2019           Review of old records- Was done and information gathered regards to events leading to surgery and health conditions of significance in the perioperative period.        Preoperative cardiac risk assessment-  The patient does not have any active cardiac conditions . Revised cardiac risk index predictors- 0---.Functional capacity is more than 4 Mets. She will be undergoing a Orthopedic procedure that carries a intermediate risk     Risk of a major Cardiac event ( Defined as death, myocardial infarction, or cardiac arrest at 30 days after noncardiac surgery), based on RCRI score     -3.9%       No further cardiac work up is indicated prior to proceeding with the surgery          American Society of Anesthesiologists Physical status classification ( ASA ) class:      Postoperative pulmonary complication risk assessment:      ARISCAT ( Canet) risk index- risk class -  Low, if duration of surgery is under 3 hours, intermediate, if duration of surgery is over 3 hours          Assessment/Plan:     Essential (primary) hypertension  Toprol xl  Losartan-HCTZ    Home BP readings -120/80's  Recent BP readings in the record-120/80's  Hypertension-  Blood pressure is acceptable . I suggest  continuation of  Toprol xl - during the entire perioperative period. I suggest holding   Losartan-HCTZ- on the morning of the surgery and can continue that  post operatively under blood pressure, electrolyte and renal function monitoring as long as they are acceptable.I suggest addressing pain control as uncontrolled pain can increased blood pressure     Multiple food allergies  Coffee   Lettuce   Cely   Onion   Cotton seed    Acid reflux  Controlled on aciphex  GERD  Symptoms -acid taste to the throat   GERD-  I suggest continuation of the Proton pump inhibitor in the perioperative period . I suggest aspiration precautions    Interstitial cystitis  Doing well on Elmiron   Unable to hold Elmiron for long   Wants to hold 5 days pre op   No suggestions of UTI - she knows how she feels when has UTI    Sleep apnea  Sleep apnea .Uses CPAP .Suggested bringing for hospital use .   Informed the risk of worsening sleep apnea in the perioperative period and suggest using CPAP use any time in 24 hrs ( day or night )for planned sleep    I suggest  caution with usage of medication that can cause respiratory suppression in the perioperative period    Avoidance of  supine sleep, weight gain , alcoholic beverages , care with , sedative , CNS depressant use indicated  since all of these can worsen COLTEN     Care with sedating Medication suggested              Cervical dystonia  Has limitation of neck movements on both sides   Gets Botox injections every 3 months  Last procedure Oct 2019   Unlikely to require Endo tracheal intubation      Trigeminal neuralgia  Tegretol helps   I suggest monitoring the sodium as 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    Chronic idiopathic constipation  Constipation- I suggest giving laxative regimen as opioid use,reduced  ambulation  can increase the constipation   Up to date with colonoscopy    Hormone replacement therapy (HRT)  Still has uterus  For hot flashes , joint pains    On hormone replacement therapy   Risk / benefits ofhormone replacement therpay   use in the perioperative period discussed   Risk of thrombosis discussed with hormone replacement therpay  use  Preferable to hold hormone replacement therpay 1-2 weeks pre op until  Mobility returns to base line     Suggested discussing with Gynecologist about his    Risk of withdrawal vaginal bleeding discussed       Female bladder prolapse  Urinary   Using Pessary- suggested staying on it  No suggestion of UTI     Personal history of spine surgery  Cervical spine - about 2009   For mengioma    Nausea and vomiting  With opioids , after surgeries   I suggest that the perioperative team be aware of this so that appropriate preventive care can be taken     Family history of diabetes mellitus  As per her, no personal history of Diabetes   Was checked    Family history of thrombosis  Father , in the post op setting   Seems to provoked   No multiple family members with thrombosis     Onychomycosis  Left great toe   Off Lamisil for 2 years   Uses Ciclopirox, Neosporin   suggested not to pick on with a probe for ingrowing toe tail   No suggestion of cellulitis for bacterial fever infection - Left foot     Abnormal EKG-12/2/2019  No suggestion of symptomatic CAD with good functional capacity         Preventive perioperative care    Thromboembolic prophylaxis:  Her risk factors for thrombosis include surgical procedure, age and hormone replacement therapy.I suggest  thromboembolic prophylaxis ( mechanical/pharmacological, weighing the risk benefits of pharmacological agent use considering ronna procedural bleeding )  during the perioperative period.I suggested being active in the post operative period.   The patient is a candidate for extended DVT prophylaxis    Postoperative pulmonary  complication prophylaxis-Risk factors for post operative pulmonary complications include sleep apnea  age over 65 years and ASA class >2- I suggest incentive spirometry use, early ambulation and end tidal carbon dioxide monitoring  , oral care , head end of bed elevation      Renal complication prophylaxis-Risk factors for renal complications include hypertension . I suggest keeping her well hydrated in the perioperative period.     Surgical site Infection Prophylaxis-I  suggest appropriate antibiotic for Prophylaxis against Surgical site infections     Delirium prophylaxis-Risk factors - Advanced Age - I suggest avoidance / minimizing the use of  Benzodiazepines ( unless the patient has been taking it on a regular basis ),Anticholinergic medication,Antihistamines ( like  Benadryl).I suggest minimizing the use of opioid medication and use of IV tylenol,if it is appropriate. I suggest using the lowest possible dose of opioids for the shortest duration possible in the perioperative period. I suggest to Keep shades/blinds open during the day, lights off and shades closed at night to encourage normal sleep/wake cycle.I encourage the presence of the family member with the patient at all times, if at all possible as mental status changes can be picked up early by the family members and they help with reorientation. I encouraged the presence of family to help with orientation in the perioperative period. Benadryl avoidance suggested      In view of regional Anesthesia  the patient  is at risk of postoperative urinary retention.  I suggest avoidance / minimizing the of  Benzodiazepines,Anticholinergic medication,antihistamines ( Benadryl) , if possible in the perioperative period. I suggest using the minimum possible use of opioids for the minimum period of time in the perioperative period. Benadryl avoidance suggested      This visit was focused on Preoperative evaluation, Perioperative Medical management, complication  reduction plans. I suggest that the patient follows up with primary care or relevant sub specialists for ongoing health care.    I appreciate the opportunity to be involved in this patients care. Please feel free to contact me if there were any questions about this consultation.    Patient is optimized     Patient was instructed to call and update me about any changes to health,  medication, office visits ,testing out side of the ronna operative care center , hospitalizations between now and surgery     Christine Hernandez MD  Perioperative Medicine  [facility]   Pager [phone]  --  12/2- 18 28     EKG from 12/2/2019 personally reviewed , report pending showed , no acute changes   -  12/4- 1310     EKG report from 12/2  Normal sinus rhythm  Inferior infarct ,age undetermined  Abnormal ECG  No previous ECGs available    Called and spoke to her   Never had heart trouble   Had chest tightness in the past 5 years ago - felt like acid reflux - did not last long   Stays active     No suggestion of symptomatic CAD with good functional capacity     She is under cardiology care   She is not aware of CAD, heart failure- suggested calling Cardiologist   Holding HRT- okayed per Gynecology     Requested office to  obtain Cardiologist office note , stress test, echo, Carotid doppler   --  12/5- 18 09     Cardiology records         Echo Sept 2011      -  18 37     Called to follow up , spoke to her, to address any concerns with the up coming surgery or any questions on Medication instructions-   Doing well ,No changes to Medication, Health -  Saw PCP yesterday- as per her understanding , can proceed with surgery   She plans on following up with Dr Alcantara her cardiologist , post surgery  No redness around toe nail   No exertional chest pain, chest tightness , SOB  -  12/8- 19 26    Older EKG from 5/29/2018 compared to most recent from 12/2/2019  No suggestion of symptomatic CAD with good functional capacity   On  Beta blocker

## 2019-12-02 NOTE — LETTER
December 2, 2019      Rhonda G Leopold, MD  [address]  [place] [address]           [facility] - Pre [facility] Consult  [address] [facility] [address]  Phone: [phone]          Patient: Evy Mendoza   MR Number: [id number]   YOB: 1945   Date of Visit: 12/2/2019       Dear Dr. Rhonda G Leopold:    Thank you for referring Evy Mendoza to me for evaluation. Attached you will find relevant portions of my assessment and plan of care.    If you have questions, please do not hesitate to call me. I look forward to following Evy Mendoza along with you.    Sincerely,    Christine Hernandez MD    Enclosure  CC:  [facility] MD    If you would like to receive this communication electronically, please contact [email or web address] or [phone] to request more information on [email or web address] Link access.    For providers and/or their staff who would like to refer a patient to [facility], please contact us through our one-stop-shop provider referral line, [facility], at [phone].    If you feel you have received this communication in error or would no longer like to receive these types of communications, please e-mail [email or web address]

## 2019-12-02 NOTE — DISCHARGE INSTRUCTIONS
Your surgery has been scheduled for:__________________________________________    You should report to:  ____[facility], located on the [place] side of the first floor of the           [facility] ([phone])  ____[facility], located on the [facility] side of the            Second floor of the [facility] ([phone])  ____3rd Floor [facility] located on the [facility] side of the [facility] [phone]  ____[facility]: located at [address] [place] [address]. Check in on the first floor of the hospital.  Please Note   - Tell your doctor if you take Aspirin, products containing Aspirin, herbal medications  or blood thinners, such as Coumadin, Ticlid, or Plavix.  (Consult your provider regarding holding or stopping before surgery).  - Arrange for someone to drive you home following surgery.  You will not be allowed to leave the surgical facility alone or drive yourself home following sedation and anesthesia.  Before Surgery  - Stop taking all herbal medications 14 days prior to surgery  - No Motrin/Advil (Ibuprofen)  1 day before surgery  - No Aleve (Naproxen) 7 days before surgery  - Stop Taking Asprin, products containing Asprin _____days before surgery  - Stop taking blood thinners_______days before surgery  - No Goody's/BC  Powder 7 days before surgery  - Refrain from drinking alcoholic beverages for 24hours before and after surgery  - Stop or limit smoking _________days before surgery  - You may take Tylenol for pain  Night before Surgery  - Do not eat or drink after midnight  - Take a shower or bath (shower is recommended).  Bathe with Hibiclens soap or an antibacterial soap from the neck down.  If not supplied by your surgeon, hibiclens soap will need to be purchased over the counter in pharmacy.  Rinse soap off thoroughly.  - Shampoo your hair with your regular  shampoo  The Day of Surgery  - Take another bath or shower with hibiclens or any antibacterial soap, to reduce the chance of infection.  - Take heart and blood pressure medications with a small sip of water, as advised by the perioperative team.  - Do not take fluid pills  - You may brush your teeth and rinse your mouth, but do not swall any additional water.   - Do not apply perfumes, powder, body lotions or deodorant on the day of surgery.  - Nail polish should be removed.  - Do not wear makeup or moisturizer  - Wear comfortable clothes, such as a button front shirt and loose fitting pants.  - Leave all jewelry, including body piercings, and valuables at home.    - Bring any devices you will neeed after surgery such as crutches or canes.  - If you have sleep apnea, please bring your CPAP machine  In the event that your physical condition changes including the onset of a cold or respiratory illness, or if you have to delay or cancel your surgery, please notify your surgeon. Anesthesia: Regional Anesthesia    Youre scheduled for surgery. During surgery, youll receive medicine called anesthesia to keep you comfortable and pain-free. Your surgeon has decided that youll receive regional anesthesia. This sheet tells you what to expect with this type of anesthesia.  What is regional anesthesia?  Regional anesthesia numbs one region of your body. The anesthesia may be given around nerves or into veins in your arms, neck, or legs (nerve block or [place] block). Or it may be sent into the spinal fluid (spinal anesthesia) or into the space just outside the spinal fluid (epidural anesthesia). You may also be given sedatives to help you relax.  Nerve block or [place] block  A small area of the body, such as an arm or leg, can be numbed using a nerve block or [place] block.  · Nerve block. During a nerve block, your skin is numbed. A needle is then inserted near nerves that serve the area to be numbed. Anesthetic is sent through the  needle.  · IV regional or [place]ma[place] block. For this type of block, an IV line is put into a vein. The blood flow to the area to be numbed is blocked for a short time. Anesthetic is sent through the IV.  Spinal anesthesia  Spinal anesthesia numbs your body from about the waist down.  · Anesthetic is injected into the spinal fluid. This is a substance that surrounds the spinal cord in your spinal column. The anesthetic blocks pain traveling from the body to the brain.  · To receive the anesthetic, your skin is numbed at the injection site on your back.  · A needle is then inserted into the spinal space. Anesthetic is sent into the spinal fluid through the needle.  Epidural anesthesia  Epidural anesthesia is most commonly used during childbirth and may also be used after surgical procedures of the chest, belly, and legs.  · Anesthetic is injected into the epidural space. This is just outside the dural sac which contains the spinal fluid.  · To receive the anesthetic, your skin is numbed at the injection site on your back.  · A needle is then inserted into the epidural space. Anesthetic is sent into the epidural space through the needle.  · A small flexible catheter may be attached to the needle and left in place. This allows for continuous injections or infusions of anesthetic.  Anesthesia tools and medicines that might be near you during your procedure  · Local anesthetic. This medicine is given through a needle numbs one region of your body.  · Electrocardiography leads (electrodes). These are used to record your heart rate and rhythm.  · Blood pressure cuff. A cuff is placed on your arm to keep track of your blood pressure.  · Pulse oximeter. This small clip is placed on the end of the finger. It measures your blood oxygen level.  · Sedatives. These medicines may be given through an IV. They help to relax you and keep you comfortable. You may stay awake or sleep lightly.  · Oxygen. You may be given oxygen through a  facemask.  Risks and possible complications  Regional anesthesia carries some risks. These include:  · Nausea and vomiting  · Headache  · Backache  · Decreased blood pressure  · Allergic reaction to the anesthetic  · Ongoing numbness (rare)  · Irregular heartbeat (rare)  · Cardiac arrest (rare)   Date Last Reviewed: 12/1/2016  © 3683-0425 [employer]. [address] [place], [place] [address]. All rights reserved. This information is not intended as a substitute for professional medical care. Always follow your healthcare professional's instructions.

## 2019-12-02 NOTE — ASSESSMENT & PLAN NOTE
Controlled on aciphex  GERD  Symptoms -acid taste to the throat   GERD-  I suggest continuation of the Proton pump inhibitor in the perioperative period . I suggest aspiration precautions

## 2019-12-02 NOTE — ASSESSMENT & PLAN NOTE
With opioids , after surgeries   I suggest that the perioperative team be aware of this so that appropriate preventive care can be taken

## 2019-12-02 NOTE — ASSESSMENT & PLAN NOTE
Doing well on [place]   Unable to hold [place] for long   Wants to hold 5 days pre op   No suggestions of UTI - she knows how she feels when has UTI

## 2019-12-02 NOTE — ASSESSMENT & PLAN NOTE
Constipation- I suggest giving laxative regimen as opioid use,reduced ambulation  can increase the constipation   Up to date with colonoscopy

## 2019-12-02 NOTE — ASSESSMENT & PLAN NOTE
Sleep apnea .Uses CPAP .Suggested bringing for hospital use .   Informed the risk of worsening sleep apnea in the perioperative period and suggest using CPAP use any time in 24 hrs ( day or night )for planned sleep    I suggest  caution with usage of medication that can cause respiratory suppression in the perioperative period    Avoidance of  supine sleep, weight gain , alcoholic beverages , care with , sedative , CNS depressant use indicated  since all of these can worsen COLTEN     Care with sedating Medication suggested

## 2019-12-03 ENCOUNTER — TELEPHONE (OUTPATIENT)
Dept: OBSTETRICS AND GYNECOLOGY | Facility: CLINIC | Age: 74
End: 2019-12-03

## 2019-12-03 NOTE — TELEPHONE ENCOUNTER
----- Message from Silva Eller MA sent at 12/3/2019  8:38 AM CST -----  Contact: self      ----- Message -----  From: Deedee Feliberto  Sent: 12/3/2019   8:33 AM CST  To: Jeremias Cunningham Staff    Evy Mendoza  MRN: [id number]  Home Phone      [phone]  Work Phone      Not on file.  [employer]          [phone]    Patient Care Team:  Rakesh Devine MD as PCP - General (Internal Medicine)  Marisa Mcdowell MD (Obstetrics and Gynecology)  OB? No  What phone number can you be reached at? [phone]  Message:   Pt is having surgery next week and wants to discuss hormones with a  Nurse. Please advise.

## 2019-12-03 NOTE — TELEPHONE ENCOUNTER
Patient wanted to let you know her surgeon (she is having a total knee) asked her to hold the prempro due to possiblity of blood clots is there an alternative please advise

## 2019-12-04 NOTE — ANESTHESIA PAT ROS NOTE
12/04/2019  Evy Mendoza is a 74 y.o., female.      Pre-op Assessment      I have reviewed the Medications.   Steroids Taken In Past Year:     Review of Systems  Anesthesia Hx:  No problems with previous Anesthesia  History of prior surgery of interest to airway management or planning: Previous anesthesia: MAC  cATARACT sURGERY: 6/2019 with MAC.  Denies Family Hx of Anesthesia complications.   Denies Personal Hx of Anesthesia complications.   Social:  Denies Tobacco Use. Denies Alcohol Use.   Hematology/Oncology:  Hematology Normal   Oncology Normal     EENT/Dental:EENT/Dental Normal   Cardiovascular:   Hypertension  Functional Capacity good / => 4 METS, rides stationary bike x 5 miles daily: denies CP/SOB  Denies Coronary Artery Disease.  Denies Deep Venous Thrombosis (DVT)  Hypertension , Recent typical clinic B/P of 130/82 @ POC visit    Pulmonary:   Sleep Apnea  Denies Asthma.  Denies Chronic Obstructive Pulmonary Disease (COPD).  Obstructive Sleep Apnea (COLTEN), CPAP used. Pulmonary Hypertension (per old Echo per outside cardiologist) Echocardiographic Estimated Pulmonary Artery Systolic Pressure >=35 - 45    Renal/:   Bladder prolapse Other Renal / Gu Conditions: Interstitial Cystitis (Has Pessary)  Hepatic/GI:   GERD, well controlled  Hepatic/GI Symptoms: constipation.  Esophageal / Stomach Disorders Gerd Controlled by chronic antireflux medication.  Hernia, Hiatal Hernia Denies Liver Disease    Musculoskeletal:   Cervical dystonia  S/P Benign tumor removed from neck    Spinal Stenosis- lumbar Bone Disorders: Osteoporosis  Spine Disorders: lumbar    Neurological:  Neurology Normal  Denies Pain  Pain Etiology/Diagnosis , secondary to Trigeminal Neuralgia    Endocrine:  Endocrine Normal  Denies Diabetes  Denies Thyroid Disease  Metabolic Disorders, Hyperlipoproteinemia,  hypercholesterolemia      Physical Exam  General:  Obesity    Airway/Jaw/Neck:  Airway Findings: Mouth Opening: Normal Tongue: Normal  General Airway Assessment: Adult  Mallampati: I  Jaw/Neck Findings:  Neck ROM: Decreased Lateral Motion, to the right, to the left      Dental:  Dental Findings: Lower Dentures   Chest/Lungs:  Chest/Lungs Findings: Clear to auscultation     Heart/Vascular:  Heart Findings: Rate: Normal  Rhythm: Regular Rhythm  Sounds: Normal        Mental Status:  Mental Status Findings:  Cooperative, Alert and Oriented         The patient was seen by Perioperative Internal Medicine physician Dr. Hernandez on 12/2/19 , please see recommendations.      Most Recent Outside Cardiology note scanned in media 12/4/19 along with Stress test and old Echo.    Discharge Plan: To home with  (Brent: [phone])      Jennifer Zhang RN

## 2019-12-06 ENCOUNTER — ANESTHESIA EVENT (OUTPATIENT)
Dept: SURGERY | Facility: HOSPITAL | Age: 74
DRG: 470 | End: 2019-12-06
Payer: MEDICARE

## 2019-12-06 ENCOUNTER — TELEPHONE (OUTPATIENT)
Dept: ORTHOPEDICS | Facility: CLINIC | Age: 74
End: 2019-12-06

## 2019-12-06 DIAGNOSIS — Z96.652 STATUS POST LEFT KNEE REPLACEMENT: Primary | ICD-10-CM

## 2019-12-06 RX ORDER — OXYCODONE HYDROCHLORIDE 5 MG/1
TABLET ORAL
Qty: 50 TABLET | Refills: 0 | Status: SHIPPED | OUTPATIENT
Start: 2019-12-06 | End: 2019-12-23 | Stop reason: SDUPTHER

## 2019-12-06 RX ORDER — DOCUSATE SODIUM 100 MG/1
100 CAPSULE, LIQUID FILLED ORAL 2 TIMES DAILY PRN
Qty: 60 CAPSULE | Refills: 0 | Status: SHIPPED | OUTPATIENT
Start: 2019-12-06 | End: 2023-06-28

## 2019-12-06 RX ORDER — ASPIRIN 81 MG/1
81 TABLET ORAL 2 TIMES DAILY
Qty: 60 TABLET | Refills: 0 | Status: SHIPPED | OUTPATIENT
Start: 2019-12-06 | End: 2022-03-08

## 2019-12-06 RX ORDER — CELECOXIB 200 MG/1
200 CAPSULE ORAL DAILY
Qty: 30 CAPSULE | Refills: 0 | Status: SHIPPED | OUTPATIENT
Start: 2019-12-06 | End: 2020-01-05

## 2019-12-06 RX ORDER — DEXTROMETHORPHAN HYDROBROMIDE, GUAIFENESIN 5; 100 MG/5ML; MG/5ML
650 LIQUID ORAL EVERY 8 HOURS PRN
Qty: 120 TABLET | Refills: 0 | Status: SHIPPED | OUTPATIENT
Start: 2019-12-06 | End: 2021-03-02

## 2019-12-06 NOTE — TELEPHONE ENCOUNTER
we spoke : Reminded to eat light the night before surgery, NPO after midnight, take hibclens shower the night before surgery  & again in the am , sleep on clean sheets  in clean clothes, no laxatives or stool softeners , [facility] [address]  This  Monday morning for 5 am   She voiced understanding

## 2019-12-08 PROBLEM — R94.31 ABNORMAL EKG: Status: ACTIVE | Noted: 2019-12-08

## 2019-12-09 ENCOUNTER — ANESTHESIA (OUTPATIENT)
Dept: SURGERY | Facility: HOSPITAL | Age: 74
DRG: 470 | End: 2019-12-09
Payer: MEDICARE

## 2019-12-09 ENCOUNTER — HOSPITAL ENCOUNTER (INPATIENT)
Facility: HOSPITAL | Age: 74
LOS: 1 days | Discharge: HOME HEALTH | DRG: 470 | End: 2019-12-10
Attending: ORTHOPAEDIC SURGERY | Admitting: ORTHOPAEDIC SURGERY
Payer: MEDICARE

## 2019-12-09 DIAGNOSIS — Z01.818 PRE-OP TESTING: Primary | ICD-10-CM

## 2019-12-09 DIAGNOSIS — Z96.659 S/P KNEE REPLACEMENT: ICD-10-CM

## 2019-12-09 DIAGNOSIS — M17.12 PRIMARY OSTEOARTHRITIS OF LEFT KNEE: ICD-10-CM

## 2019-12-09 PROCEDURE — 25000003 PHARM REV CODE 250: Performed by: NURSE PRACTITIONER

## 2019-12-09 PROCEDURE — 63600175 PHARM REV CODE 636 W HCPCS: Performed by: NURSE PRACTITIONER

## 2019-12-09 PROCEDURE — 94799 UNLISTED PULMONARY SVC/PX: CPT

## 2019-12-09 PROCEDURE — 36000711: Performed by: ORTHOPAEDIC SURGERY

## 2019-12-09 PROCEDURE — 64450 NJX AA&/STRD OTHER PN/BRANCH: CPT | Mod: 59,LT,, | Performed by: ANESTHESIOLOGY

## 2019-12-09 PROCEDURE — 76942 ECHO GUIDE FOR BIOPSY: CPT | Mod: 26,,, | Performed by: ANESTHESIOLOGY

## 2019-12-09 PROCEDURE — 94761 N-INVAS EAR/PLS OXIMETRY MLT: CPT

## 2019-12-09 PROCEDURE — 97165 OT EVAL LOW COMPLEX 30 MIN: CPT

## 2019-12-09 PROCEDURE — 76942 ECHO GUIDE FOR BIOPSY: CPT | Performed by: ANESTHESIOLOGY

## 2019-12-09 PROCEDURE — 97116 GAIT TRAINING THERAPY: CPT

## 2019-12-09 PROCEDURE — 99900035 HC TECH TIME PER 15 MIN (STAT)

## 2019-12-09 PROCEDURE — 63600175 PHARM REV CODE 636 W HCPCS

## 2019-12-09 PROCEDURE — 97161 PT EVAL LOW COMPLEX 20 MIN: CPT

## 2019-12-09 PROCEDURE — 11000001 HC ACUTE MED/SURG PRIVATE ROOM

## 2019-12-09 PROCEDURE — 97535 SELF CARE MNGMENT TRAINING: CPT

## 2019-12-09 PROCEDURE — 25000003 PHARM REV CODE 250

## 2019-12-09 PROCEDURE — 63600175 PHARM REV CODE 636 W HCPCS: Performed by: ORTHOPAEDIC SURGERY

## 2019-12-09 PROCEDURE — 71000039 HC RECOVERY, EACH ADD'L HOUR: Performed by: ORTHOPAEDIC SURGERY

## 2019-12-09 PROCEDURE — 63600175 PHARM REV CODE 636 W HCPCS: Performed by: NURSE ANESTHETIST, CERTIFIED REGISTERED

## 2019-12-09 PROCEDURE — C1776 JOINT DEVICE (IMPLANTABLE): HCPCS | Performed by: ORTHOPAEDIC SURGERY

## 2019-12-09 PROCEDURE — 64448 PR NERVE BLOCK INJ, ANES/STEROID, FEMORAL, CONT INFUSION, INCL IMAG GUIDANCE: ICD-10-PCS | Mod: 59,LT,, | Performed by: ANESTHESIOLOGY

## 2019-12-09 PROCEDURE — 37000009 HC ANESTHESIA EA ADD 15 MINS: Performed by: ORTHOPAEDIC SURGERY

## 2019-12-09 PROCEDURE — 71000033 HC RECOVERY, INTIAL HOUR: Performed by: ORTHOPAEDIC SURGERY

## 2019-12-09 PROCEDURE — D9220A PRA ANESTHESIA: ICD-10-PCS | Mod: CRNA,,, | Performed by: NURSE ANESTHETIST, CERTIFIED REGISTERED

## 2019-12-09 PROCEDURE — 36000710: Performed by: ORTHOPAEDIC SURGERY

## 2019-12-09 PROCEDURE — 27201423 OPTIME MED/SURG SUP & DEVICES STERILE SUPPLY: Performed by: ORTHOPAEDIC SURGERY

## 2019-12-09 PROCEDURE — D9220A PRA ANESTHESIA: Mod: CRNA,,, | Performed by: NURSE ANESTHETIST, CERTIFIED REGISTERED

## 2019-12-09 PROCEDURE — 64450 PR NERVE BLOCK INJ, ANES/STEROID, OTHER PERIPHERAL: ICD-10-PCS | Mod: 59,LT,, | Performed by: ANESTHESIOLOGY

## 2019-12-09 PROCEDURE — 37000008 HC ANESTHESIA 1ST 15 MINUTES: Performed by: ORTHOPAEDIC SURGERY

## 2019-12-09 PROCEDURE — D9220A PRA ANESTHESIA: ICD-10-PCS | Mod: ANES,,, | Performed by: ANESTHESIOLOGY

## 2019-12-09 PROCEDURE — 27447 PR TOTAL KNEE ARTHROPLASTY: ICD-10-PCS | Mod: LT,,, | Performed by: ORTHOPAEDIC SURGERY

## 2019-12-09 PROCEDURE — 64448 NJX AA&/STRD FEM NRV NFS IMG: CPT | Performed by: ANESTHESIOLOGY

## 2019-12-09 PROCEDURE — D9220A PRA ANESTHESIA: Mod: ANES,,, | Performed by: ANESTHESIOLOGY

## 2019-12-09 PROCEDURE — 25000003 PHARM REV CODE 250: Performed by: ANESTHESIOLOGY

## 2019-12-09 PROCEDURE — 27447 TOTAL KNEE ARTHROPLASTY: CPT | Mod: LT,,, | Performed by: ORTHOPAEDIC SURGERY

## 2019-12-09 PROCEDURE — 25000003 PHARM REV CODE 250: Performed by: NURSE ANESTHETIST, CERTIFIED REGISTERED

## 2019-12-09 PROCEDURE — 63600175 PHARM REV CODE 636 W HCPCS: Performed by: ANESTHESIOLOGY

## 2019-12-09 PROCEDURE — C1713 ANCHOR/SCREW BN/BN,TIS/BN: HCPCS | Performed by: ORTHOPAEDIC SURGERY

## 2019-12-09 PROCEDURE — 94770 HC EXHALED C02 TEST: CPT

## 2019-12-09 PROCEDURE — 76942 PR U/S GUIDANCE FOR NEEDLE GUIDANCE: ICD-10-PCS | Mod: 26,,, | Performed by: ANESTHESIOLOGY

## 2019-12-09 PROCEDURE — 64448 NJX AA&/STRD FEM NRV NFS IMG: CPT | Mod: 59,LT,, | Performed by: ANESTHESIOLOGY

## 2019-12-09 PROCEDURE — 97530 THERAPEUTIC ACTIVITIES: CPT

## 2019-12-09 DEVICE — PSN ALL POLY PAT 32MM: Type: IMPLANTABLE DEVICE | Site: KNEE | Status: FUNCTIONAL

## 2019-12-09 DEVICE — IMPLANTABLE DEVICE: Type: IMPLANTABLE DEVICE | Site: KNEE | Status: FUNCTIONAL

## 2019-12-09 DEVICE — CEMENT BONE WHOLE BATCH: Type: IMPLANTABLE DEVICE | Site: KNEE | Status: FUNCTIONAL

## 2019-12-09 DEVICE — PLUG BONE #5: Type: IMPLANTABLE DEVICE | Site: KNEE | Status: FUNCTIONAL

## 2019-12-09 DEVICE — PSN TIB STM 5 DEG SZ CL: Type: IMPLANTABLE DEVICE | Site: KNEE | Status: FUNCTIONAL

## 2019-12-09 DEVICE — COMP FEM POST SC PER SZ 5 LF: Type: IMPLANTABLE DEVICE | Site: KNEE | Status: FUNCTIONAL

## 2019-12-09 RX ORDER — SODIUM CHLORIDE 9 MG/ML
INJECTION, SOLUTION INTRAVENOUS CONTINUOUS
Status: ACTIVE | OUTPATIENT
Start: 2019-12-09 | End: 2019-12-10

## 2019-12-09 RX ORDER — CELECOXIB 200 MG/1
200 CAPSULE ORAL DAILY
Status: DISCONTINUED | OUTPATIENT
Start: 2019-12-10 | End: 2019-12-10 | Stop reason: HOSPADM

## 2019-12-09 RX ORDER — ONDANSETRON 2 MG/ML
4 INJECTION INTRAMUSCULAR; INTRAVENOUS EVERY 8 HOURS PRN
Status: DISCONTINUED | OUTPATIENT
Start: 2019-12-09 | End: 2019-12-10 | Stop reason: HOSPADM

## 2019-12-09 RX ORDER — PHENYLEPHRINE HYDROCHLORIDE 10 MG/ML
INJECTION INTRAVENOUS
Status: DISCONTINUED | OUTPATIENT
Start: 2019-12-09 | End: 2019-12-09

## 2019-12-09 RX ORDER — NALOXONE HCL 0.4 MG/ML
0.02 VIAL (ML) INJECTION
Status: DISCONTINUED | OUTPATIENT
Start: 2019-12-09 | End: 2019-12-10 | Stop reason: HOSPADM

## 2019-12-09 RX ORDER — ROPIVACAINE HYDROCHLORIDE 2 MG/ML
8 INJECTION, SOLUTION EPIDURAL; INFILTRATION; PERINEURAL CONTINUOUS
Status: DISCONTINUED | OUTPATIENT
Start: 2019-12-09 | End: 2019-12-10 | Stop reason: HOSPADM

## 2019-12-09 RX ORDER — CEFAZOLIN SODIUM 1 G/3ML
2 INJECTION, POWDER, FOR SOLUTION INTRAMUSCULAR; INTRAVENOUS
Status: COMPLETED | OUTPATIENT
Start: 2019-12-09 | End: 2019-12-10

## 2019-12-09 RX ORDER — MIDAZOLAM HYDROCHLORIDE 1 MG/ML
INJECTION, SOLUTION INTRAMUSCULAR; INTRAVENOUS
Status: DISCONTINUED | OUTPATIENT
Start: 2019-12-09 | End: 2019-12-09

## 2019-12-09 RX ORDER — OXYCODONE HYDROCHLORIDE 5 MG/1
10 TABLET ORAL
Status: DISCONTINUED | OUTPATIENT
Start: 2019-12-09 | End: 2019-12-09

## 2019-12-09 RX ORDER — MORPHINE SULFATE 2 MG/ML
2 INJECTION, SOLUTION INTRAMUSCULAR; INTRAVENOUS
Status: DISCONTINUED | OUTPATIENT
Start: 2019-12-09 | End: 2019-12-09

## 2019-12-09 RX ORDER — FENTANYL CITRATE 50 UG/ML
25 INJECTION, SOLUTION INTRAMUSCULAR; INTRAVENOUS EVERY 5 MIN PRN
Status: DISCONTINUED | OUTPATIENT
Start: 2019-12-09 | End: 2019-12-09 | Stop reason: HOSPADM

## 2019-12-09 RX ORDER — LOSARTAN POTASSIUM AND HYDROCHLOROTHIAZIDE 12.5; 5 MG/1; MG/1
1 TABLET ORAL DAILY
Status: DISCONTINUED | OUTPATIENT
Start: 2019-12-09 | End: 2019-12-10 | Stop reason: HOSPADM

## 2019-12-09 RX ORDER — PREGABALIN 75 MG/1
75 CAPSULE ORAL NIGHTLY
Status: DISCONTINUED | OUTPATIENT
Start: 2019-12-09 | End: 2019-12-10 | Stop reason: HOSPADM

## 2019-12-09 RX ORDER — MUPIROCIN 20 MG/G
1 OINTMENT TOPICAL 2 TIMES DAILY
Status: DISCONTINUED | OUTPATIENT
Start: 2019-12-09 | End: 2019-12-10 | Stop reason: HOSPADM

## 2019-12-09 RX ORDER — BUPIVACAINE HYDROCHLORIDE 2.5 MG/ML
INJECTION, SOLUTION EPIDURAL; INFILTRATION; INTRACAUDAL
Status: DISPENSED
Start: 2019-12-09 | End: 2019-12-09

## 2019-12-09 RX ORDER — LIDOCAINE HCL/PF 100 MG/5ML
SYRINGE (ML) INTRAVENOUS
Status: DISCONTINUED | OUTPATIENT
Start: 2019-12-09 | End: 2019-12-09

## 2019-12-09 RX ORDER — ROPIVACAINE HYDROCHLORIDE 5 MG/ML
INJECTION, SOLUTION EPIDURAL; INFILTRATION; PERINEURAL
Status: DISPENSED
Start: 2019-12-09 | End: 2019-12-09

## 2019-12-09 RX ORDER — EPINEPHRINE 1 MG/ML
INJECTION, SOLUTION INTRACARDIAC; INTRAMUSCULAR; INTRAVENOUS; SUBCUTANEOUS
Status: DISPENSED
Start: 2019-12-09 | End: 2019-12-09

## 2019-12-09 RX ORDER — SODIUM CHLORIDE 9 MG/ML
INJECTION, SOLUTION INTRAVENOUS CONTINUOUS PRN
Status: DISCONTINUED | OUTPATIENT
Start: 2019-12-09 | End: 2019-12-09

## 2019-12-09 RX ORDER — MUPIROCIN 20 MG/G
OINTMENT TOPICAL
Status: COMPLETED
Start: 2019-12-09 | End: 2019-12-09

## 2019-12-09 RX ORDER — TALC
6 POWDER (GRAM) TOPICAL NIGHTLY PRN
Status: DISCONTINUED | OUTPATIENT
Start: 2019-12-09 | End: 2019-12-09 | Stop reason: HOSPADM

## 2019-12-09 RX ORDER — LIDOCAINE HYDROCHLORIDE 10 MG/ML
1 INJECTION, SOLUTION EPIDURAL; INFILTRATION; INTRACAUDAL; PERINEURAL
Status: DISCONTINUED | OUTPATIENT
Start: 2019-12-09 | End: 2019-12-09 | Stop reason: HOSPADM

## 2019-12-09 RX ORDER — OXYCODONE HYDROCHLORIDE 5 MG/1
5 TABLET ORAL
Status: DISCONTINUED | OUTPATIENT
Start: 2019-12-09 | End: 2019-12-10 | Stop reason: HOSPADM

## 2019-12-09 RX ORDER — SODIUM CHLORIDE 9 MG/ML
INJECTION, SOLUTION INTRAVENOUS
Status: COMPLETED | OUTPATIENT
Start: 2019-12-09 | End: 2019-12-09

## 2019-12-09 RX ORDER — VANCOMYCIN HYDROCHLORIDE 500 MG/10ML
INJECTION, POWDER, LYOPHILIZED, FOR SOLUTION INTRAVENOUS
Status: DISCONTINUED | OUTPATIENT
Start: 2019-12-09 | End: 2019-12-09 | Stop reason: HOSPADM

## 2019-12-09 RX ORDER — FENTANYL CITRATE 50 UG/ML
25 INJECTION, SOLUTION INTRAMUSCULAR; INTRAVENOUS EVERY 5 MIN PRN
Status: COMPLETED | OUTPATIENT
Start: 2019-12-09 | End: 2019-12-09

## 2019-12-09 RX ORDER — ASPIRIN 81 MG/1
81 TABLET ORAL 2 TIMES DAILY
Status: DISCONTINUED | OUTPATIENT
Start: 2019-12-09 | End: 2019-12-10 | Stop reason: HOSPADM

## 2019-12-09 RX ORDER — POLYETHYLENE GLYCOL 3350 17 G/17G
17 POWDER, FOR SOLUTION ORAL DAILY
Status: DISCONTINUED | OUTPATIENT
Start: 2019-12-09 | End: 2019-12-10 | Stop reason: HOSPADM

## 2019-12-09 RX ORDER — AMOXICILLIN 250 MG
1 CAPSULE ORAL 2 TIMES DAILY
Status: DISCONTINUED | OUTPATIENT
Start: 2019-12-09 | End: 2019-12-10 | Stop reason: HOSPADM

## 2019-12-09 RX ORDER — GENTAMICIN SULFATE 40 MG/ML
INJECTION, SOLUTION INTRAMUSCULAR; INTRAVENOUS
Status: DISCONTINUED | OUTPATIENT
Start: 2019-12-09 | End: 2019-12-09 | Stop reason: HOSPADM

## 2019-12-09 RX ORDER — PREGABALIN 75 MG/1
CAPSULE ORAL
Status: COMPLETED
Start: 2019-12-09 | End: 2019-12-09

## 2019-12-09 RX ORDER — ACETAMINOPHEN 500 MG
1000 TABLET ORAL EVERY 6 HOURS
Status: DISCONTINUED | OUTPATIENT
Start: 2019-12-09 | End: 2019-12-10 | Stop reason: HOSPADM

## 2019-12-09 RX ORDER — MIDAZOLAM HYDROCHLORIDE 1 MG/ML
INJECTION INTRAMUSCULAR; INTRAVENOUS
Status: COMPLETED
Start: 2019-12-09 | End: 2019-12-09

## 2019-12-09 RX ORDER — PREGABALIN 75 MG/1
75 CAPSULE ORAL
Status: COMPLETED | OUTPATIENT
Start: 2019-12-09 | End: 2019-12-09

## 2019-12-09 RX ORDER — ONDANSETRON 2 MG/ML
INJECTION INTRAMUSCULAR; INTRAVENOUS
Status: DISCONTINUED | OUTPATIENT
Start: 2019-12-09 | End: 2019-12-09

## 2019-12-09 RX ORDER — CEFAZOLIN SODIUM 1 G/3ML
2 INJECTION, POWDER, FOR SOLUTION INTRAMUSCULAR; INTRAVENOUS
Status: COMPLETED | OUTPATIENT
Start: 2019-12-09 | End: 2019-12-09

## 2019-12-09 RX ORDER — OXYCODONE HYDROCHLORIDE 10 MG/1
10 TABLET ORAL
Status: DISCONTINUED | OUTPATIENT
Start: 2019-12-09 | End: 2019-12-10 | Stop reason: HOSPADM

## 2019-12-09 RX ORDER — PROPOFOL 10 MG/ML
VIAL (ML) INTRAVENOUS CONTINUOUS PRN
Status: DISCONTINUED | OUTPATIENT
Start: 2019-12-09 | End: 2019-12-09

## 2019-12-09 RX ORDER — MIDAZOLAM HYDROCHLORIDE 1 MG/ML
1 INJECTION INTRAMUSCULAR; INTRAVENOUS EVERY 5 MIN PRN
Status: DISCONTINUED | OUTPATIENT
Start: 2019-12-09 | End: 2019-12-09 | Stop reason: HOSPADM

## 2019-12-09 RX ORDER — OXYCODONE HYDROCHLORIDE 5 MG/1
5 TABLET ORAL
Status: DISCONTINUED | OUTPATIENT
Start: 2019-12-09 | End: 2019-12-09

## 2019-12-09 RX ORDER — DEXAMETHASONE SODIUM PHOSPHATE 4 MG/ML
INJECTION, SOLUTION INTRA-ARTICULAR; INTRALESIONAL; INTRAMUSCULAR; INTRAVENOUS; SOFT TISSUE
Status: DISCONTINUED | OUTPATIENT
Start: 2019-12-09 | End: 2019-12-09

## 2019-12-09 RX ORDER — FENTANYL CITRATE 50 UG/ML
INJECTION, SOLUTION INTRAMUSCULAR; INTRAVENOUS
Status: DISCONTINUED
Start: 2019-12-09 | End: 2019-12-09 | Stop reason: WASHOUT

## 2019-12-09 RX ORDER — MORPHINE SULFATE 2 MG/ML
2 INJECTION, SOLUTION INTRAMUSCULAR; INTRAVENOUS
Status: DISCONTINUED | OUTPATIENT
Start: 2019-12-09 | End: 2019-12-10 | Stop reason: HOSPADM

## 2019-12-09 RX ORDER — CLONIDINE 100 UG/ML
INJECTION, SOLUTION EPIDURAL
Status: DISPENSED
Start: 2019-12-09 | End: 2019-12-09

## 2019-12-09 RX ORDER — OXYCODONE HYDROCHLORIDE 5 MG/1
15 TABLET ORAL
Status: DISCONTINUED | OUTPATIENT
Start: 2019-12-09 | End: 2019-12-09

## 2019-12-09 RX ORDER — FENTANYL CITRATE 50 UG/ML
INJECTION, SOLUTION INTRAMUSCULAR; INTRAVENOUS
Status: DISCONTINUED | OUTPATIENT
Start: 2019-12-09 | End: 2019-12-09

## 2019-12-09 RX ORDER — CELECOXIB 200 MG/1
CAPSULE ORAL
Status: COMPLETED
Start: 2019-12-09 | End: 2019-12-09

## 2019-12-09 RX ORDER — KETAMINE HYDROCHLORIDE 10 MG/ML
INJECTION, SOLUTION INTRAMUSCULAR; INTRAVENOUS
Status: DISCONTINUED | OUTPATIENT
Start: 2019-12-09 | End: 2019-12-09

## 2019-12-09 RX ORDER — SODIUM CHLORIDE 0.9 % (FLUSH) 0.9 %
10 SYRINGE (ML) INJECTION
Status: DISCONTINUED | OUTPATIENT
Start: 2019-12-09 | End: 2019-12-09 | Stop reason: HOSPADM

## 2019-12-09 RX ORDER — METOPROLOL SUCCINATE 50 MG/1
50 TABLET, EXTENDED RELEASE ORAL DAILY
Status: DISCONTINUED | OUTPATIENT
Start: 2019-12-10 | End: 2019-12-10 | Stop reason: HOSPADM

## 2019-12-09 RX ORDER — BUPIVACAINE HYDROCHLORIDE 5 MG/ML
INJECTION, SOLUTION EPIDURAL; INTRACAUDAL
Status: DISPENSED
Start: 2019-12-09 | End: 2019-12-09

## 2019-12-09 RX ORDER — BISACODYL 10 MG
10 SUPPOSITORY, RECTAL RECTAL EVERY 12 HOURS PRN
Status: DISCONTINUED | OUTPATIENT
Start: 2019-12-09 | End: 2019-12-10 | Stop reason: HOSPADM

## 2019-12-09 RX ORDER — MUPIROCIN 20 MG/G
1 OINTMENT TOPICAL
Status: COMPLETED | OUTPATIENT
Start: 2019-12-09 | End: 2019-12-09

## 2019-12-09 RX ORDER — PROPOFOL 10 MG/ML
VIAL (ML) INTRAVENOUS
Status: DISCONTINUED | OUTPATIENT
Start: 2019-12-09 | End: 2019-12-09

## 2019-12-09 RX ORDER — CARBAMAZEPINE 200 MG/1
200 TABLET ORAL 2 TIMES DAILY
Status: DISCONTINUED | OUTPATIENT
Start: 2019-12-10 | End: 2019-12-10 | Stop reason: HOSPADM

## 2019-12-09 RX ORDER — CELECOXIB 200 MG/1
400 CAPSULE ORAL
Status: COMPLETED | OUTPATIENT
Start: 2019-12-09 | End: 2019-12-09

## 2019-12-09 RX ORDER — FAMOTIDINE 20 MG/1
20 TABLET, FILM COATED ORAL 2 TIMES DAILY
Status: DISCONTINUED | OUTPATIENT
Start: 2019-12-09 | End: 2019-12-10 | Stop reason: HOSPADM

## 2019-12-09 RX ADMIN — PHENYLEPHRINE HYDROCHLORIDE 200 MCG: 10 INJECTION INTRAVENOUS at 08:12

## 2019-12-09 RX ADMIN — MUPIROCIN 1 G: 20 OINTMENT TOPICAL at 05:12

## 2019-12-09 RX ADMIN — OXYCODONE HYDROCHLORIDE 10 MG: 5 TABLET ORAL at 09:12

## 2019-12-09 RX ADMIN — FENTANYL CITRATE 25 MCG: 50 INJECTION INTRAMUSCULAR; INTRAVENOUS at 11:12

## 2019-12-09 RX ADMIN — OXYCODONE HYDROCHLORIDE 5 MG: 5 TABLET ORAL at 12:12

## 2019-12-09 RX ADMIN — DEXAMETHASONE SODIUM PHOSPHATE 8 MG: 4 INJECTION, SOLUTION INTRAMUSCULAR; INTRAVENOUS at 07:12

## 2019-12-09 RX ADMIN — FENTANYL CITRATE 25 MCG: 50 INJECTION, SOLUTION INTRAMUSCULAR; INTRAVENOUS at 07:12

## 2019-12-09 RX ADMIN — MIDAZOLAM 1 MG: 1 INJECTION INTRAMUSCULAR; INTRAVENOUS at 07:12

## 2019-12-09 RX ADMIN — SODIUM CHLORIDE: 0.9 INJECTION, SOLUTION INTRAVENOUS at 08:12

## 2019-12-09 RX ADMIN — MIDAZOLAM HYDROCHLORIDE 1 MG: 1 INJECTION, SOLUTION INTRAMUSCULAR; INTRAVENOUS at 06:12

## 2019-12-09 RX ADMIN — FENTANYL CITRATE 25 MCG: 50 INJECTION INTRAMUSCULAR; INTRAVENOUS at 09:12

## 2019-12-09 RX ADMIN — FENTANYL CITRATE 25 MCG: 50 INJECTION INTRAMUSCULAR; INTRAVENOUS at 10:12

## 2019-12-09 RX ADMIN — TRANEXAMIC ACID 1000 MG: 100 INJECTION, SOLUTION INTRAVENOUS at 07:12

## 2019-12-09 RX ADMIN — ACETAMINOPHEN 1000 MG: 500 TABLET ORAL at 12:12

## 2019-12-09 RX ADMIN — SENNOSIDES AND DOCUSATE SODIUM 1 TABLET: 8.6; 5 TABLET ORAL at 08:12

## 2019-12-09 RX ADMIN — SODIUM CHLORIDE: 0.9 INJECTION, SOLUTION INTRAVENOUS at 06:12

## 2019-12-09 RX ADMIN — FAMOTIDINE 20 MG: 20 TABLET ORAL at 08:12

## 2019-12-09 RX ADMIN — ASPIRIN 81 MG: 81 TABLET, COATED ORAL at 09:12

## 2019-12-09 RX ADMIN — PHENYLEPHRINE HYDROCHLORIDE 200 MCG: 10 INJECTION INTRAVENOUS at 07:12

## 2019-12-09 RX ADMIN — ACETAMINOPHEN 1000 MG: 500 TABLET ORAL at 06:12

## 2019-12-09 RX ADMIN — ROPIVACAINE HYDROCHLORIDE 8 ML/HR: 2 INJECTION, SOLUTION EPIDURAL; INFILTRATION at 08:12

## 2019-12-09 RX ADMIN — PREGABALIN 75 MG: 75 CAPSULE ORAL at 05:12

## 2019-12-09 RX ADMIN — ROPIVACAINE HYDROCHLORIDE 8 ML/HR: 2 INJECTION, SOLUTION EPIDURAL; INFILTRATION at 09:12

## 2019-12-09 RX ADMIN — SODIUM CHLORIDE: 0.9 INJECTION, SOLUTION INTRAVENOUS at 09:12

## 2019-12-09 RX ADMIN — CELECOXIB 400 MG: 200 CAPSULE ORAL at 05:12

## 2019-12-09 RX ADMIN — VANCOMYCIN HYDROCHLORIDE 1500 MG: 1.5 INJECTION, POWDER, LYOPHILIZED, FOR SOLUTION INTRAVENOUS at 06:12

## 2019-12-09 RX ADMIN — KETAMINE HYDROCHLORIDE 25 MG: 10 INJECTION INTRAMUSCULAR; INTRAVENOUS at 07:12

## 2019-12-09 RX ADMIN — ONDANSETRON 4 MG: 2 INJECTION INTRAMUSCULAR; INTRAVENOUS at 08:12

## 2019-12-09 RX ADMIN — MUPIROCIN 1 G: 20 OINTMENT TOPICAL at 08:12

## 2019-12-09 RX ADMIN — SODIUM CHLORIDE, SODIUM GLUCONATE, SODIUM ACETATE, POTASSIUM CHLORIDE, MAGNESIUM CHLORIDE, SODIUM PHOSPHATE, DIBASIC, AND POTASSIUM PHOSPHATE: .53; .5; .37; .037; .03; .012; .00082 INJECTION, SOLUTION INTRAVENOUS at 07:12

## 2019-12-09 RX ADMIN — OXYCODONE HYDROCHLORIDE 5 MG: 5 TABLET ORAL at 06:12

## 2019-12-09 RX ADMIN — PREGABALIN 75 MG: 75 CAPSULE ORAL at 08:12

## 2019-12-09 RX ADMIN — PROPOFOL 50 MG: 10 INJECTION, EMULSION INTRAVENOUS at 07:12

## 2019-12-09 RX ADMIN — PROPOFOL 75 MCG/KG/MIN: 10 INJECTION, EMULSION INTRAVENOUS at 07:12

## 2019-12-09 RX ADMIN — OXYCODONE HYDROCHLORIDE 5 MG: 5 TABLET ORAL at 09:12

## 2019-12-09 RX ADMIN — ASPIRIN 81 MG: 81 TABLET, COATED ORAL at 08:12

## 2019-12-09 RX ADMIN — SODIUM CHLORIDE, SODIUM GLUCONATE, SODIUM ACETATE, POTASSIUM CHLORIDE, MAGNESIUM CHLORIDE, SODIUM PHOSPHATE, DIBASIC, AND POTASSIUM PHOSPHATE: .53; .5; .37; .037; .03; .012; .00082 INJECTION, SOLUTION INTRAVENOUS at 08:12

## 2019-12-09 RX ADMIN — FAMOTIDINE 20 MG: 20 TABLET ORAL at 09:12

## 2019-12-09 RX ADMIN — LIDOCAINE HYDROCHLORIDE 50 MG: 20 INJECTION, SOLUTION INTRAVENOUS at 07:12

## 2019-12-09 RX ADMIN — SENNOSIDES AND DOCUSATE SODIUM 1 TABLET: 8.6; 5 TABLET ORAL at 09:12

## 2019-12-09 RX ADMIN — CEFAZOLIN 2 G: 1 INJECTION, POWDER, FOR SOLUTION INTRAMUSCULAR; INTRAVENOUS at 03:12

## 2019-12-09 RX ADMIN — MEPIVACAINE HYDROCHLORIDE 3 ML: 15 INJECTION, SOLUTION EPIDURAL; INFILTRATION at 07:12

## 2019-12-09 RX ADMIN — PHENYLEPHRINE HYDROCHLORIDE 100 MCG: 10 INJECTION INTRAVENOUS at 07:12

## 2019-12-09 RX ADMIN — SODIUM CHLORIDE: 0.9 INJECTION, SOLUTION INTRAVENOUS at 03:12

## 2019-12-09 RX ADMIN — CEFAZOLIN 2 G: 330 INJECTION, POWDER, FOR SOLUTION INTRAMUSCULAR; INTRAVENOUS at 07:12

## 2019-12-09 RX ADMIN — POLYETHYLENE GLYCOL 3350 17 G: 17 POWDER, FOR SOLUTION ORAL at 12:12

## 2019-12-09 NOTE — BRIEF OP NOTE
[facility]  Surgery Department  Operative Note    SUMMARY     Date of Procedure: 12/9/2019     Procedure: Procedure(s) (LRB):  ARTHROPLASTY, KNEE, TOTAL (Left)     Surgeon(s) and Role:     * [facility] MD - Primary    Assisting Surgeon: None    Pre-Operative Diagnosis: Osteoarthritis of knee, unspecified [M17.9]    Post-Operative Diagnosis: Post-Op Diagnosis Codes:     * Osteoarthritis of knee, unspecified [M17.9]    Anesthesia: Femoral Block    Technical Procedures Used:  PS       Description of the Findings of the Procedure: Valgus    Significant Surgical Tasks Conducted by the Assistant(s), if Applicable: closure    Complications: No    Estimated Blood Loss (EBL): 100cc             Implants:   Implant Name Type Inv. Item Serial No.  Lot No. LRB No. Used   CEMENT BONE WHOLE BATCH - [id number]  CEMENT BONE WHOLE BATCH  [employer]. [id number] Left 2   PLUG BONE #5 - [id number]  PLUG BONE #5  [employer]. [id number] Left 1   COMP FEM POST SC PER SZ 5 LF - [id number]  COMP FEM POST SC PER SZ 5 LF  RAFITA,INC [id number] Left 1   PSN TIB STM 5 DEG SZ CL - [id number]  PSN TIB STM 5 DEG SZ CL  RAFITA,INC [id number] Left 1   PSN ALL POLY PAT 32MM - [id number]  PSN ALL POLY PAT 32MM  RAFITA,INC [id number] Left 1   SCREW HEX HEAD - [id number]  SCREW HEX HEAD  [employer]FT[employer],INC  Left 1   BIT DRILL PIN TROCAR 3.2MM - [id number]  BIT DRILL PIN TROCAR 3.2MM  RAFITA,INC  Left 1   SCREW HEX HEAD 3.5X38MM - [id number]  SCREW HEX HEAD 3.5X38MM  RAFITA,INC  Left 1   PSN ASF PS SZ CD 3-5 10MM LEFT - [id number]  PSN ASF PS SZ CD 3-5 10MM LEFT  RAFITA,INC [id number] Left 1       Specimens:   Specimen (12h ago, onward)    None                  Condition: Good    Disposition: PACU - hemodynamically stable.    Attestation: I was present and scrubbed for the entire procedure.

## 2019-12-09 NOTE — ANESTHESIA PROCEDURE NOTES
Left Adductor Cath    Patient location during procedure: pre-op   Block not for primary anesthetic.  Reason for block: at surgeon's request and post-op pain management   Post-op Pain Location: left knee pain   Start time: 12/9/2019 6:30 AM  Timeout: 12/9/2019 6:27 AM   End time: 12/9/2019 6:40 AM    Staffing  Authorizing Provider: Zaina Suazo MD  Performing Provider: Zaina Suazo MD    Preanesthetic Checklist  Completed: patient identified, site marked, surgical consent, pre-op evaluation, timeout performed, IV checked, risks and benefits discussed and monitors and equipment checked  Peripheral Block  Patient position: supine  Prep: ChloraPrep and site prepped and draped  Patient monitoring: heart rate, cardiac monitor, continuous pulse ox, continuous capnometry and frequent blood pressure checks  Block type: adductor canal  Laterality: left  Injection technique: continuous  Needle  Needle type: Tuohy   Needle gauge: 17 G  Needle length: 3.5 in  Needle localization: anatomical landmarks and ultrasound guidance  Catheter type: spring wound  Catheter size: 19 G  Test dose: lidocaine 1.5% with Epi 1-to-200,000 and negative   -ultrasound image captured on disc.  Assessment  Injection assessment: negative aspiration, negative parasthesia and local visualized surrounding nerve  Paresthesia pain: none  Heart rate change: no  Slow fractionated injection: yes  Additional Notes  VSS.  [facility]OS[facility] RN monitoring vitals throughout procedure.  Patient tolerated procedure well.  20 cc 0.25% ropiv with 1;400 k epi injected incrementally after neg asp

## 2019-12-09 NOTE — TRANSFER OF CARE
"Anesthesia Transfer of Care Note    Patient: Evy Mendoza    Procedure(s) Performed: Procedure(s) (LRB):  ARTHROPLASTY, KNEE, TOTAL (Left)    Patient location: PACU    Anesthesia Type: general    Transport from OR: Transported from OR on room air with adequate spontaneous ventilation    Post pain: adequate analgesia    Post assessment: no apparent anesthetic complications and tolerated procedure well    Post vital signs: stable    Level of consciousness: sedated    Nausea/Vomiting: no nausea/vomiting    Complications: none    Transfer of care protocol was followed      Last vitals:   Visit Vitals  /65   Pulse 82   Temp 36.6 °C (97.8 °F) (Oral)   Resp 16   Ht 5' 3" (1.6 m)   Wt 86.6 kg (191 lb)   SpO2 (!) 93%   Breastfeeding? No   BMI 33.83 kg/m²     "

## 2019-12-09 NOTE — INTERVAL H&P NOTE
The patient has been examined and the H&P has been reviewed:    I concur with the findings and no changes have occurred since H&P was written.    Anesthesia/Surgery risks, benefits and alternative options discussed and understood by patient/family.          Active Hospital Problems    Diagnosis  POA    S/P knee replacement [Z96.659]  Not Applicable      Resolved Hospital Problems   No resolved problems to display.

## 2019-12-09 NOTE — PT/OT/SLP EVAL
Occupational Therapy   Evaluation    Name: Evy Mendoza  MRN: [id number]  Admitting Diagnosis:  S/P knee replacement Day of Surgery    Recommendations:     Discharge Recommendations: home  Discharge Equipment Recommendations:  shower chair  Barriers to discharge:  None    Assessment:     Evy Mendoza is a 74 y.o. female with a medical diagnosis of S/P knee replacement.  She was able to perform supine/sit, sit/stand, BSC/toilet, and chair T/F c SBA and RW.  Able to perform UB dressing c max A 2* IV lines.  B UE are WFL.  Able to walk to bathroom c SBA and RW.  Pt is progressing well. Performance deficits affecting function: impaired self care skills, impaired functional mobilty, orthopedic precautions.      Rehab Prognosis: Good; patient would benefit from acute skilled OT services to address these deficits and reach maximum level of function.       Plan:     Patient to be seen daily to address the above listed problems via self-care/home management, therapeutic activities, therapeutic exercises  · Plan of Care Expires: 12/10/19  · Plan of Care Reviewed with: patient, spouse    Subjective     Chief Complaint: Pt is s/p L TKA and is WBAT L LE  Patient/Family Comments/goals: To get better.    Occupational Profile:  Living Environment: Pt lives in a one story house c no KIMBERLEE.  Has a walk-in shower.  Previous level of function: I PTA  Equipment Used at Home:  walker, standard, bedside commode, grab bar(Reacher)  Assistance upon Discharge:     Pain/Comfort:  · Pain Rating 1: 3/10(L knee)    Patients cultural, spiritual, Synagogue conflicts given the current situation: no    Objective:     Communicated with: RN prior to session.  Patient found supine with perineural catheter, cryotherapy, peripheral IV upon OT entry to room.    General Precautions: Standard, fall   Orthopedic Precautions:LLE weight bearing as tolerated   Braces: N/A     Occupational Performance:    Bed Mobility:    · Patient completed  Supine to Sit with stand by assistance    Functional Mobility/Transfers:  · Patient completed Sit <> Stand Transfer with stand by assistance  with  rolling walker   · Patient completed Bed <> Chair Transfer using Stand Pivot technique with stand by assistance with rolling walker  · Patient completed Toilet Transfer Stand Pivot technique with stand by assistance with  rolling walker and bedside commode  · Functional Mobility: Pt was able to walk to bathroom c SBA and RW.    Activities of Daily Living:  · Upper Body Dressing: maximal assistance to don hospital gown.    Cognitive/Visual Perceptual:  Cognitive/Psychosocial Skills:     -       Oriented to: Person, Place, Time and Situation   -       Follows Commands/attention:Follows multistep  commands    Physical Exam:  Upper Extremity Range of Motion:     -       Right Upper Extremity: WFL  -       Left Upper Extremity: WFL  Upper Extremity Strength:    -       Right Upper Extremity: WFL  -       Left Upper Extremity: WFL    AMPAC 6 Click ADL:  AMPAC Total Score: 22    Treatment & Education:  Educated pt on [place].  Education:    Patient left up in chair with all lines intact, call button in reach, RN notified and family present    GOALS:   Multidisciplinary Problems     Occupational Therapy Goals        Problem: Occupational Therapy Goal    Goal Priority Disciplines Outcome Interventions   Occupational Therapy Goal     OT, PT/OT Ongoing, Progressing    Description:  Goals to be met by: 12/10/19     Patient will increase functional independence with ADLs by performing:    UE Dressing with [place].  LE Dressing with Modified [place] and Assistive Devices as needed.  Grooming while standing at sink with Modified [place].  Toileting from bedside commode with Modified [place] for hygiene and clothing management.   Bathing from  shower chair/bench with Modified [place].  Toilet transfer to bedside commode with Modified [place].                       History:     Past Medical History:   Diagnosis Date    Arthritis     Cataract     Fibrocystic breast     GERD (gastroesophageal reflux disease)     Hyperlipidemia     Hypertension     Interstitial cystitis     Osteoporosis     Sleep apnea        Past Surgical History:   Procedure Laterality Date    Benign Tumor Removed from neck      BREAST CYST ASPIRATION Left     COLONOSCOPY  8/2013    EYE SURGERY      KNEE SURGERY      X3    Rt sided carpal tunnel release      SPINE SURGERY      Tonsillectomy      TUBAL LIGATION         Time Tracking:     OT Date of Treatment: 12/09/19  OT Start Time: 1546  OT Stop Time: 1610  OT Total Time (min): 24 min    Billable Minutes:Evaluation 8  Self Care/Home Management 8  Therapeutic Activity 8    MACARIO Leigh  12/9/2019

## 2019-12-09 NOTE — PLAN OF CARE
Pt arrived on Unit from PACU. Oriented to room and call bell. Instructed not to get out of bed without assistane. Nad. Will continue to monitor

## 2019-12-09 NOTE — PLAN OF CARE
[facility]    HOME HEALTH ORDERS  FACE TO FACE ENCOUNTER    Patient Name: Evy Mendoza  YOB: 1945    PCP: Rakesh Devine MD   PCP Address: [address] / [place] [address]  PCP Phone Number: [phone]  PCP Fax: [phone]    Encounter Date: 12/09/2019    Admit to Home Health    Diagnoses:  Active Hospital Problems    Diagnosis  POA    S/P knee replacement [Z96.659]  Not Applicable      Resolved Hospital Problems   No resolved problems to display.       Future Appointments   Date Time Provider Department Center   12/24/2019  9:15 AM Laura Montano NP [facility] ORTHO [address]   2/6/2020  2:00 PM Terry Clinton MD [facility] NEURO [address]   2/20/2020  2:20 PM Rakesh Devine MD [facility]  [facility]           I have seen and examined this patient face to face today. My clinical findings that support the need for the home health skilled services and home bound status are the following:  Weakness/numbness causing balance and gait disturbance due to Joint Replacement making it taxing to leave home.    Allergies:  Review of patient's allergies indicates:   Allergen Reactions    Coffee Other (See Comments)     congestion    Cottonseed Other (See Comments)     congestion    Ginger Other (See Comments)     congestion    Lettuce Other (See Comments)     congestion    Onion Other (See Comments)     congestion       Diet: regular diet    Activities: activity as tolerated    Home Health Admitting Clinician:   SN/PT to complete comprehensive assessment including routine vital signs. Instruct on disease process and s/s of complications to report to MD. Follow specific home health arthoplasty protocol. Review/verify medication list sent home with the patient at time of discharge  and instruct patient/caregiver as needed. If coumadin ordered, coumadin clinic to manage INR with INR draws 2x per week with a goal to maintain INR between 1.8 and 2.2. Frequency may  be adjusted depending on start of care date.    Notify MD if SBP > 160 or < 90; DBP > 90 or < 50; HR > 120 or < 50; Temp > 101    Home Medical Equipment:  Walker, 3-1 bedside commode, transfer tub bench    CONSULTS:    Physical Therapy may admit if patient not on coumadin, PT to perform comprehensive assessment if performing admit visit and generate therapy plan of care. Evaluate for home safety and equipment needs; Establish/upgrade home exercise program. Perform/instruct on therapeutic exercises, gait training, transfer training, and Range of Motion.          WOUND CARE ORDERS:  Assess Surgical Incision/DSRG each TX  Aquacel AG drsg applied post-op leave on 14 days post op. Call MD if any drainage reaches border to border of drsg horizontally, s/s of infection, temp >101, induration, swelling or redness.  If dressing is removed per MD order, then apply island dressing, change/teach caregiver to perform daily dressing change if island dressing present.    Medications: Review discharge medications with patient and family and provide education.      Current Discharge Medication List      CONTINUE these medications which have NOT CHANGED    Details   acetaminophen (TYLENOL) 325 MG tablet Take 325 mg by mouth every 6 (six) hours as needed for Pain.      alendronate (FOSAMAX) 70 MG tablet Take 1 tablet (70 mg total) by mouth every 7 days.  Qty: 12 tablet, Refills: 3    Associated Diagnoses: Age-related osteoporosis without current pathological fracture      ascorbic acid (VITAMIN C ORAL) Take by mouth.      calcium-vitamin D3 (CALCIUM 500 + D) 500 mg(1,250mg) -200 unit per tablet Take 1 tablet by mouth once daily at 6am.       carBAMazepine (TEGRETOL) 200 mg tablet TAKE 1 TABLET TWICE DAILY  Qty: 180 tablet, Refills: 3      cetirizine (ZYRTEC) 10 MG tablet Take 5 mg by mouth as needed for Allergies.       cinnamon bark (CINNAMON ORAL) Take by mouth.      cyanocobalamin 500 MCG tablet Take 500 mcg by mouth once daily.       ezetimibe (ZETIA) 10 mg tablet Take 1 tablet (10 mg total) by mouth once daily.  Qty: 90 tablet, Refills: 3      fish oil-omega-3 fatty acids 300-1,000 mg capsule Take 1 g by mouth once daily.       losartan-hydrochlorothiazide 50-12.5 mg (HYZAAR) 50-12.5 mg per tablet TAKE 1 TABLET EVERY DAY  Qty: 90 tablet, Refills: 3      metoprolol succinate (TOPROL-XL) 50 MG 24 hr tablet TAKE 1/2 TABLET (25MG) BY MOUTH ONE TIME DAILY   Qty: 45 tablet, Refills: 3      RABEprazole (ACIPHEX) 20 mg tablet TAKE 1 TABLET EVERY DAY  Qty: 90 tablet, Refills: 3      simvastatin (ZOCOR) 20 MG tablet Take 1 tablet (20 mg total) by mouth every evening.  Qty: 90 tablet, Refills: 3      traMADol (ULTRAM) 50 mg tablet TAKE ONE TABLET BY MOUTH TWICE DAILY  Qty: 180 tablet, Refills: 0      acetaminophen (TYLENOL) 650 MG TbSR Take 1 tablet (650 mg total) by mouth every 8 (eight) hours as needed.  Qty: 120 tablet, Refills: 0    Associated Diagnoses: Status post left knee replacement      albuterol (PROVENTIL) 2.5 mg /3 mL (0.083 %) nebulizer solution INHALE THE CONTENTS OF 1 VIAL VIA NEBULIZER FOUR TIMES A DAY AS NEEDED FOR WHEEZING  Refills: 0      aspirin (ECOTRIN) 81 MG EC tablet Take 1 tablet (81 mg total) by mouth 2 (two) times daily.  Qty: 60 tablet, Refills: 0    Associated Diagnoses: Status post left knee replacement      celecoxib (CELEBREX) 200 MG capsule Take 1 capsule (200 mg total) by mouth once daily.  Qty: 30 capsule, Refills: 0    Associated Diagnoses: Status post left knee replacement      ciclopirox (PENLAC) 8 % Soln APPLY TOPICALLY NIGHTLY.  Qty: 3 mL, Refills: 0      !! docusate sodium (COLACE) 100 MG capsule Take 100 mg by mouth once daily.      !! docusate sodium (COLACE) 100 MG capsule Take 1 capsule (100 mg total) by mouth 2 (two) times daily as needed for Constipation.  Qty: 60 capsule, Refills: 0    Associated Diagnoses: Status post left knee replacement      ELMIRON 100 mg Cap TAKE 1 CAPSULE THREE TIMES DAILY  Qty:  270 capsule, Refills: 3      FLUAD 1249-3234, 65 YR UP,,PF, 45 mcg (15 mcg x 3)/0.5 mL Syrg ADM 0.5ML IM UTD  Refills: 0      fluticasone (FLONASE) 50 mcg/actuation nasal spray 1 spray by Nasal route as needed for Allergies.       glucos sul 2KCl/msm/chond/C/Mn (GLUCOSAMINE CHONDROITIN ORAL) Take by mouth.      glucosamine-chondroitin 500-400 mg tablet Take 1 tablet by mouth once daily at 6am.       lanolin/mineral oil/petrolatum (EYE LUBRICANT OPHT) Apply to eye.      lidocaine (LIDODERM) 5 % Place 1 patch onto the skin once daily. Remove & Discard patch within 12 hours or as directed by MD  Qty: 14 patch, Refills: 0      lidocaine-prilocaine (EMLA) cream Apply topically 2 (two) times daily as needed.  Qty: 30 g, Refills: 3      multivitamin (ONE DAILY MULTIVITAMIN) per tablet Take 1 tablet by mouth once daily.      mupirocin (BACTROBAN) 2 % ointment Apply topically 3 (three) times daily.  Qty: 15 g, Refills: 0      neomycin/bacitracin/polymyxinB (NEOSPORIN, SVX-ZNP-FYTBA, TOP) Apply topically nightly. Left big toe      oxyCODONE (ROXICODONE) 5 MG immediate release tablet Take 1-2 tabs by mouth every 4-6 hours as needed for pain  Qty: 50 tablet, Refills: 0    Comments: Greater than 7 day supply is medically necessary. Deliver to [place] for surgery 12/9.  Associated Diagnoses: Status post left knee replacement      oxyquinoline-sod.lauryl sulfat (TRIMO-GRIGGS JELLY) 0.025-0.01 % Gel Insert vaginally nightly three times per week.  Qty: 113.4 g, Refills: 2      PREMPRO 0.3-1.5 mg per tablet Take 1 tablet by mouth once daily.  Qty: 90 tablet, Refills: 3      SHINGRIX, PF, 50 mcg/0.5 mL injection ADM 0.5ML IM UTD  Refills: 0      turmeric root extract 500 mg Cap Take by mouth.      UNABLE TO FIND medication name: Herbevision       !! - Potential duplicate medications found. Please discuss with provider.          I certify that this patient is confined to her home and needs intermittent skilled nursing care, physical  therapy and occupational therapy.

## 2019-12-09 NOTE — ANESTHESIA PROCEDURE NOTES
CSE    Patient location during procedure: OR  Start time: 12/9/2019 7:01 AM  Timeout: 12/9/2019 7:00 AM  End time: 12/9/2019 7:11 AM    Staffing  Authorizing Provider: Zaina Suazo MD  Performing Provider: Zaina Suazo MD    Preanesthetic Checklist  Completed: patient identified, site marked, surgical consent, pre-op evaluation, timeout performed, IV checked, risks and benefits discussed and monitors and equipment checked  CSE  Patient position: sitting  Prep: ChloraPrep and site prepped and draped  Patient monitoring: heart rate, cardiac monitor, continuous pulse ox, continuous capnometry and frequent blood pressure checks  Approach: right paramedian  Spinal Needle  Needle type: Kenn   Needle gauge: 25 G  Needle length: 5 in  Epidural Needle  Injection technique: JENIFFER air  Needle type: Tuohy   Needle gauge: 17 G  Needle length: 3.5 in  Needle insertion depth: 8 cm  Location: L4-5  Needle localization: anatomical landmarks  Catheter  Catheter type: none  Assessment  Sensory level: T8   Dermatomal levels determined by pinch or prick  Intrathecal Medications:  administered: primary anesthetic mcg of

## 2019-12-09 NOTE — PLAN OF CARE
Patient tolerated PT session well. She ambulated 100ft with RW and supervision. No LOB or SOB noted. She would continue to benefit from PT in order to get back to PLOF.     Problem: Physical Therapy Goal  Goal: Physical Therapy Goal  Description  Goals to be met by: 19    Patient will increase functional independence with mobility by performin. Supine to sit with Modified [place]  2. Sit to stand transfer with Modified [place]  3. Gait x250 feet with Modified [place] using Rolling Walker  4. Ascend/Descend 6 inch curb step with Stand-by Assistance using Rolling Walker  5. Lower extremity exercise program x30 reps per handout, with supervision     Outcome: Ongoing, Progressing

## 2019-12-09 NOTE — OP NOTE
DATE OF PROCEDURE:  12/09/2019    SURGEON:  John Lockwood [facility], Jr., M.D.    ASSISTANT:  Stefan Izaguirre M.D. (RES)    OPERATION PERFORMED:  Left total knee arthroplasty.    PREOPERATIVE DIAGNOSIS:  Osteoarthritis, left knee.    POSTOPERATIVE DIAGNOSIS:  Osteoarthritis, left knee.    COMPONENTS USED:  Radha Persona knee system.  We used a size 5 femur, left   standard posterior stabilized, a size C tibia, left 5-degree stem, a 10 mm   articular insert, posterior stabilized, vitamin E, highly cross-linked poly and   a 32 mm patella.    ESTIMATED BLOOD LOSS:  100 mL.    SPECIMEN:  Resected bone and tissue sent to Pathology for routine examination.    OPERATIVE TECHNIQUE:  The patient was placed supine on the operating table.    Spinal anesthesia was introduced.  The left leg was prepped and draped in   sterile fashion.  The patient was given preoperative antibiotics and the OR team   wore the sterile exhaust suits in order to minimize risk for infection.  A foot   pump was placed on the right foot to help prevent DVT.  Left leg was   exsanguinated and the tourniquet was inflated to 300 pounds of pressure.  A   straight anterior incision was made.  Sharp dissection was carried down to the   extensor mechanism.  The extensor mechanism was opened in a straight Insall   approach.  There was a valgus deformity, so I did not release the medial   collateral.  I did put the intramedullary guide in the femur, made the distal   cut at 5 degrees of valgus on a +2 setting, and then removed about 5 from the   medial side and about 6 to 7 from the lateral side.  The femur measured for a 6   and I cut it posteriorly for the 6.  The flexion gap was a little bigger, maybe   than the extension gap.  I came back and took another 2 off the extension gap   and then I released the popliteus to kind of balance the flexion gap.  I was   happy with that.  I balanced things nicely.  We did the notch and chamfer-plasty   on the femur, sized  the tibia to a C and drilled and prepared it with the same   rotation as the femur.  The patella was 24 mm and we cut it about 16 to 17.  I   then Pulsavac'd, dried the surfaces, cemented the tibial baseplate, then the   femoral component, removed the excess cement, put the 10 mm trial in, put the   knee out in extension and cemented the patella.  While the cement was hardening,   we bathed the knee in the Betadine solution.  When the cement was hard, I put   the actual 10 mm insert in and then closed the extensor mechanism with 1 Vicryl   over tranexamic acid and vancomycin powder, closed the subcutaneous tissues with   0 and 3-0 Vicryl, closed the skin with a running subcuticular 3-0 Monocryl and   skin adhesive.  Sterile surgical dressing was applied.  There were no   complications to this procedure.      LINDA/IN  dd: 12/09/2019 09:14:30 (CST)  td: 12/09/2019 10:31:58 (CST)  Doc ID   #[id number]  Job ID #[id number]    CC:

## 2019-12-09 NOTE — NURSING
Pt arrived on unit from PACU. Awake, alert oriented. Oriented family and pt to room and call bell. Instructed not to get out of bed without assistance. PNC infusing at 8 ml/hr to left lower extremity without difficulty, bulky with ace wrap intact, polar ice on, SCD on bilateral lower extremity pt  at bedside. Nad. Instructed to call for assistance when needed. will continue to monitor.

## 2019-12-09 NOTE — NURSING TRANSFER
Nursing Transfer Note      12/9/2019     Transfer To: 302    Transfer via bed    Transfer with IV pole, polar care    Transported by PCT    Medicines sent: georgina ONEIL    Chart send with patient: Yes    Notified: spouse    Patient reassessed at: 12/9/19 @6354

## 2019-12-09 NOTE — PT/OT/SLP EVAL
Physical Therapy Evaluation and Treatment     Patient Name:  Evy Mendoza   MRN:  [id number]    Recommendations:     Discharge Recommendations:  home health PT   Discharge Equipment Recommendations: none   Barriers to discharge: none    Assessment:     Evy Mendoza is a 74 y.o. female admitted with a medical diagnosis of S/P knee replacement.  She presents with the following impairments/functional limitations:  weakness, impaired functional mobilty, gait instability, impaired balance, decreased lower extremity function, decreased ROM, impaired joint extensibility, pain, impaired skin, orthopedic precautions.    Patient tolerated PT session well. She ambulated 100ft with RW and supervision. No LOB or SOB noted. She would continue to benefit from PT in order to get back to OF.     Rehab Prognosis: Good; patient would benefit from acute skilled PT services to address these deficits and reach maximum level of function.    Recent Surgery: Procedure(s) (LRB):  ARTHROPLASTY, KNEE, TOTAL (Left) Day of Surgery    Plan:     During this hospitalization, patient to be seen daily to address the identified rehab impairments via gait training, therapeutic activities, therapeutic exercises and progress toward the following goals:    · Plan of Care Expires:  12/13/19    Subjective     Chief Complaint: Minimal pain in right knee.   Patient/Family Comments/goals: To be able to ride her stationary bike.   Pain/Comfort:  · Pain Rating 1: 3/10  · Location - Side 1: Left  · Location - Orientation 1: generalized  · Location 1: knee  · Pain Addressed 1: Pre-medicate for activity, Distraction, Cessation of Activity, Nurse notified  · Pain Rating Post-Intervention 1: 3/10    Living Environment:  Patient lives with her  in a [facility] with 1 threshold to enter. Prior to admission, patients level of function was independent for functional mobility. She would occasionally use her straight cane if she was having a lot of pain that  day. Equipment used at home: cane, straight, bedside commode, walker, rolling (has a walk in tub with seat). Upon discharge, patient will have assistance from .    Objective:     Communicated with RN prior to session.  Patient found up in chair with perineural catheter, cryotherapy, FCD, peripheral IV  upon PT entry to room.    General Precautions: Standard, fall   Orthopedic Precautions:LLE weight bearing as tolerated   Braces: N/A     Exams:  · Cognitive Exam:  Patient is oriented to Person, Place, Time and Situation  · Sensation:    · -       Intact  · RLE ROM: WFL  · RLE Strength: WFL  · LLE ROM: WFL at hip and ankle but limited at knee due to recent surgery   · LLE Strength: WFL at hip and ankle but limited at knee due to recent surgery     Functional Mobility:  · Transfers:     · Sit to Stand:  supervision with rolling walker with verbal cues for hand placement    · Toilet Transfer (bedside commode over toilet): supervision with  rolling walker and verbal cues for technique and RW management   · Gait: Patient performed gait training 100ft with Rolling Walker and supervision using 3-point gait. Patient demonstrated decreased morales, decreased velocity of limb motion and decreased step length during gait due to pain, decreased ROM and decreased strength. Verbal cues provided for gait sequence especially with turns, heel toe gait pattern, left knee extension during stance phase, and RW management. No LOB or SOB noted.    · Balance: Patient stood at the sink to wash her hands with supervision and RW. No LOB noted. Verbal cues provided for RW management at sink.       Therapeutic Activities and Exercises:  Patient educated in:  -PT role and POC  -safety with transfers including hand placement  -gait sequencing and RW management  -OOB activity to maximize recovery including ambulating with nursing staff assistance and RW       AM-PAC 6 CLICK MOBILITY  Total Score:23     Patient left up in chair with all  lines intact, call button in reach, RN notified and  present.    GOALS:   Multidisciplinary Problems     Physical Therapy Goals        Problem: Physical Therapy Goal    Goal Priority Disciplines Outcome Goal Variances Interventions   Physical Therapy Goal     PT, PT/OT Ongoing, Progressing     Description:  Goals to be met by: 19    Patient will increase functional independence with mobility by performin. Supine to sit with Modified [place]  2. Sit to stand transfer with Modified [place]  3. Gait x250 feet with Modified [place] using Rolling Walker  4. Ascend/Descend 6 inch curb step with Stand-by Assistance using Rolling Walker  5. Lower extremity exercise program x30 reps per handout, with supervision                      History:     Past Medical History:   Diagnosis Date    Arthritis     Cataract     Fibrocystic breast     GERD (gastroesophageal reflux disease)     Hyperlipidemia     Hypertension     Interstitial cystitis     Osteoporosis     Sleep apnea        Past Surgical History:   Procedure Laterality Date    Benign Tumor Removed from neck      BREAST CYST ASPIRATION Left     COLONOSCOPY  2013    EYE SURGERY      KNEE SURGERY      X3    Rt sided carpal tunnel release      SPINE SURGERY      Tonsillectomy      TUBAL LIGATION         Time Tracking:     PT Received On: 19  PT Start Time: 1653     PT Stop Time: 1717  PT Total Time (min): 24 min     Billable Minutes: Evaluation 14 and Gait Training 10      Janny Fuentes, PT  2019

## 2019-12-09 NOTE — PLAN OF CARE
Family at bedside. Plan of care reviewed with both pt and family. Pt able to bend BLE. Pt states numbness present to left leg. WOB unlabored. Skin warm dry and pink. Pt NSR on moniotr

## 2019-12-09 NOTE — ANESTHESIA PROCEDURE NOTES
Left IPACK    Patient location during procedure: pre-op   Block not for primary anesthetic.  Reason for block: at surgeon's request and post-op pain management   Post-op Pain Location: left knee pain   Start time: 12/9/2019 6:40 AM  Timeout: 12/9/2019 6:27 AM   End time: 12/9/2019 6:40 AM    Staffing  Authorizing Provider: Zaina Suazo MD  Performing Provider: Zaina Suazo MD    Preanesthetic Checklist  Completed: patient identified, site marked, surgical consent, pre-op evaluation, timeout performed, IV checked, risks and benefits discussed and monitors and equipment checked  Peripheral Block  Patient position: supine  Prep: ChloraPrep  Patient monitoring: heart rate, cardiac monitor, continuous pulse ox, continuous capnometry and frequent blood pressure checks  Block type: I PACK  Laterality: left  Injection technique: single shot  Needle  Needle type: Stimuplex   Needle gauge: 21 G  Needle length: 4 in  Needle localization: anatomical landmarks and ultrasound guidance  Needle insertion depth: 7 cm   -ultrasound image captured on disc.  Assessment  Injection assessment: negative aspiration, negative parasthesia and local visualized surrounding nerve  Paresthesia pain: none  Heart rate change: no  Slow fractionated injection: yes  Additional Notes  VSS.  [facility] RN monitoring vitals throughout procedure.  Patient tolerated procedure well.  20 cc 0.375% bupiv with 1:400 k epi, 1 mg PF decadron, 50 ug clonidine

## 2019-12-09 NOTE — ANESTHESIA PREPROCEDURE EVALUATION
12/09/2019  Evy Mendoza is a 74 y.o., female.    Anesthesia Evaluation      I have reviewed the Medications.   Steroids Taken In Past Year:     Review of Systems  Anesthesia Hx:  No problems with previous Anesthesia  History of prior surgery of interest to airway management or planning: Previous anesthesia: MAC  cATARACT sURGERY: 6/2019 with MAC.  Denies Family Hx of Anesthesia complications.   Denies Personal Hx of Anesthesia complications.   Social:  Denies Tobacco Use. Denies Alcohol Use.   Hematology/Oncology:  Hematology Normal   Oncology Normal     EENT/Dental:EENT/Dental Normal   Cardiovascular:   Hypertension hyperlipidemia  Functional Capacity good / => 4 METS, rides stationary bike x 5 miles daily: denies CP/SOB  Denies Coronary Artery Disease.  Denies Deep Venous Thrombosis (DVT)  Hypertension , Recent typical clinic B/P of 130/82 @ POC visit    Pulmonary:   Sleep Apnea  Denies Asthma.  Denies Chronic Obstructive Pulmonary Disease (COPD).  Obstructive Sleep Apnea (COLTEN), CPAP used.   Renal/:   Bladder prolapse Other Renal / Gu Conditions: Interstitial Cystitis (Has Pessary)  Hepatic/GI:   GERD, well controlled  Hepatic/GI Symptoms: constipation.  Esophageal / Stomach Disorders Gerd Controlled by chronic antireflux medication.  Hernia, Hiatal Hernia Denies Liver Disease    Musculoskeletal:   Cervical dystonia  S/P Benign tumor removed from neck    Spinal Stenosis- lumbar Bone Disorders: Osteoporosis  Spine Disorders: lumbar    Neurological:  Neurology Normal  Denies Pain  Pain Etiology/Diagnosis , secondary to Trigeminal Neuralgia    Endocrine:  Endocrine Normal  Denies Diabetes  Denies Thyroid Disease  Metabolic Disorders, Hyperlipoproteinemia, hypercholesterolemia      Physical Exam  General:  Well nourished    Airway/Jaw/Neck:  Airway Findings: Mouth Opening: Normal Tongue: Normal   General Airway Assessment: Adult  Mallampati: I  Jaw/Neck Findings:  Neck ROM: Normal ROM      Dental:  Dental Findings: In tact   Chest/Lungs:  Chest/Lungs Findings: Clear to auscultation     Heart/Vascular:  Heart Findings: Rate: Normal  Rhythm: Regular Rhythm  Sounds: Normal        Mental Status:  Mental Status Findings:  Cooperative         Anesthesia Plan  Type of Anesthesia, risks & benefits discussed:  Anesthesia Type:  general, regional, spinal  Patient's Preference:   Intra-op Monitoring Plan:   Intra-op Monitoring Plan Comments:   Post Op Pain Control Plan: multimodal analgesia and peripheral nerve block  Post Op Pain Control Plan Comments:   Induction:   IV  Beta Blocker:  Patient is on a Beta-Blocker and has received one dose within the past 24 hours (No further documentation required).       Informed Consent: Patient understands risks and agrees with Anesthesia plan.  Questions answered. Anesthesia consent signed with patient.  ASA Score: 2     Day of Surgery Review of History & Physical:    H&P update referred to the surgeon.         Ready For Surgery From Anesthesia Perspective.

## 2019-12-09 NOTE — ANESTHESIA POSTPROCEDURE EVALUATION
Anesthesia Post Evaluation    Patient: Evy Mendoza    Procedure(s) Performed: Procedure(s) (LRB):  ARTHROPLASTY, KNEE, TOTAL (Left)    Final Anesthesia Type: spinal    Patient location during evaluation: PACU  Patient participation: Yes- Able to Participate  Level of consciousness: awake and alert  Post-procedure vital signs: reviewed and stable  Pain management: adequate  Airway patency: patent    PONV status at discharge: No PONV  Anesthetic complications: no      Cardiovascular status: blood pressure returned to baseline  Respiratory status: unassisted  Hydration status: euvolemic  Follow-up not needed.          Vitals Value Taken Time   /70 12/9/2019 11:28 AM   Temp 36.3 °C (97.3 °F) 12/9/2019 11:28 AM   Pulse 79 12/9/2019 11:28 AM   Resp 16 12/9/2019 11:28 AM   SpO2 96 % 12/9/2019 11:28 AM         Event Time     Out of Recovery 11:13:00          Pain/Aby Score: Pain Rating Prior to Med Admin: 3 (12/9/2019 12:37 PM)  Pain Rating Post Med Admin: 3 (12/9/2019 11:45 AM)  Aby Score: 10 (12/9/2019 10:45 AM)         Total Number Of Lesions Treated: 4

## 2019-12-09 NOTE — PLAN OF CARE
"Anesthesia Dr. Suazo notified about pts "burning pain" to left side of knee 6 out of 10 and climbing and MD advises RN to keep using PRN 25mcg fentanyl.   "

## 2019-12-09 NOTE — PLAN OF CARE
Pt states pain is now at a tolerable 4/10. Dr. Suazo with anesthesia came to bedside to reevaluate pt. Pt wob unlabored. Pt states pain is tolerable and ready tp be transferred to admit room. family at bedside and updated on plan of care

## 2019-12-09 NOTE — PLAN OF CARE
Problem: Occupational Therapy Goal  Goal: Occupational Therapy Goal  Description  Goals to be met by: 12/10/19     Patient will increase functional independence with ADLs by performing:    UE Dressing with [place].  LE Dressing with Modified [place] and Assistive Devices as needed.  Grooming while standing at sink with Modified [place].  Toileting from bedside commode with Modified [place] for hygiene and clothing management.   Bathing from  shower chair/bench with Modified [place].  Toilet transfer to bedside commode with Modified [place].     Outcome: Ongoing, Progressing

## 2019-12-10 VITALS
OXYGEN SATURATION: 96 % | WEIGHT: 191 LBS | HEART RATE: 76 BPM | TEMPERATURE: 98 F | SYSTOLIC BLOOD PRESSURE: 128 MMHG | RESPIRATION RATE: 17 BRPM | DIASTOLIC BLOOD PRESSURE: 65 MMHG | HEIGHT: 63 IN | BODY MASS INDEX: 33.84 KG/M2

## 2019-12-10 PROCEDURE — 97110 THERAPEUTIC EXERCISES: CPT

## 2019-12-10 PROCEDURE — 25000003 PHARM REV CODE 250: Performed by: STUDENT IN AN ORGANIZED HEALTH CARE EDUCATION/TRAINING PROGRAM

## 2019-12-10 PROCEDURE — 63600175 PHARM REV CODE 636 W HCPCS: Performed by: NURSE PRACTITIONER

## 2019-12-10 PROCEDURE — 25000003 PHARM REV CODE 250: Performed by: ANESTHESIOLOGY

## 2019-12-10 PROCEDURE — 25000003 PHARM REV CODE 250: Performed by: NURSE PRACTITIONER

## 2019-12-10 PROCEDURE — 97116 GAIT TRAINING THERAPY: CPT

## 2019-12-10 PROCEDURE — 94761 N-INVAS EAR/PLS OXIMETRY MLT: CPT

## 2019-12-10 PROCEDURE — 99232 SBSQ HOSP IP/OBS MODERATE 35: CPT | Mod: ,,, | Performed by: ANESTHESIOLOGY

## 2019-12-10 PROCEDURE — 97535 SELF CARE MNGMENT TRAINING: CPT

## 2019-12-10 PROCEDURE — 99232 PR SUBSEQUENT HOSPITAL CARE,LEVL II: ICD-10-PCS | Mod: ,,, | Performed by: ANESTHESIOLOGY

## 2019-12-10 PROCEDURE — 97530 THERAPEUTIC ACTIVITIES: CPT

## 2019-12-10 PROCEDURE — 99900035 HC TECH TIME PER 15 MIN (STAT)

## 2019-12-10 RX ADMIN — LOSARTAN POTASSIUM AND HYDROCHLOROTHIAZIDE 1 TABLET: 50; 12.5 TABLET, FILM COATED ORAL at 08:12

## 2019-12-10 RX ADMIN — FAMOTIDINE 20 MG: 20 TABLET ORAL at 08:12

## 2019-12-10 RX ADMIN — CEFAZOLIN 2 G: 1 INJECTION, POWDER, FOR SOLUTION INTRAMUSCULAR; INTRAVENOUS at 12:12

## 2019-12-10 RX ADMIN — OXYCODONE HYDROCHLORIDE 5 MG: 5 TABLET ORAL at 12:12

## 2019-12-10 RX ADMIN — MUPIROCIN 1 G: 20 OINTMENT TOPICAL at 08:12

## 2019-12-10 RX ADMIN — METOPROLOL SUCCINATE 50 MG: 50 TABLET, EXTENDED RELEASE ORAL at 08:12

## 2019-12-10 RX ADMIN — ROPIVACAINE HYDROCHLORIDE: 2 INJECTION, SOLUTION EPIDURAL; INFILTRATION at 12:12

## 2019-12-10 RX ADMIN — ASPIRIN 81 MG: 81 TABLET, COATED ORAL at 08:12

## 2019-12-10 RX ADMIN — CARBAMAZEPINE 200 MG: 200 TABLET ORAL at 09:12

## 2019-12-10 RX ADMIN — ACETAMINOPHEN 1000 MG: 500 TABLET ORAL at 06:12

## 2019-12-10 RX ADMIN — POLYETHYLENE GLYCOL 3350 17 G: 17 POWDER, FOR SOLUTION ORAL at 08:12

## 2019-12-10 RX ADMIN — ACETAMINOPHEN 1000 MG: 500 TABLET ORAL at 12:12

## 2019-12-10 RX ADMIN — CELECOXIB 200 MG: 200 CAPSULE ORAL at 08:12

## 2019-12-10 RX ADMIN — OXYCODONE HYDROCHLORIDE 10 MG: 10 TABLET ORAL at 09:12

## 2019-12-10 RX ADMIN — ROPIVACAINE HYDROCHLORIDE 8 ML/HR: 2 INJECTION, SOLUTION EPIDURAL; INFILTRATION at 08:12

## 2019-12-10 RX ADMIN — OXYCODONE HYDROCHLORIDE 10 MG: 10 TABLET ORAL at 12:12

## 2019-12-10 RX ADMIN — SENNOSIDES AND DOCUSATE SODIUM 1 TABLET: 8.6; 5 TABLET ORAL at 08:12

## 2019-12-10 RX ADMIN — OXYCODONE HYDROCHLORIDE 10 MG: 10 TABLET ORAL at 03:12

## 2019-12-10 RX ADMIN — OXYCODONE HYDROCHLORIDE 10 MG: 10 TABLET ORAL at 06:12

## 2019-12-10 NOTE — ASSESSMENT & PLAN NOTE
Evy Mendoza is a 74 y.o. female s/p TKA on 12/9    - WBAT  - PT/OT  - Pain control  - abx: ronna-op  - DVT PPx: asa  - Sequential Foot Compression Devices to be worn at all times when not ambulating.   - home today

## 2019-12-10 NOTE — PT/OT/SLP PROGRESS
Occupational Therapy   Treatment/Discharge Note    Name: Evy Mendoza  MRN: [id number]  Admitting Diagnosis:  S/P knee replacement  1 Day Post-Op    Recommendations:     Discharge Recommendations: home with home health  Discharge Equipment Recommendations:  none  Barriers to discharge:  None    Assessment:     Evy Mendoza is a 74 y.o. female with a medical diagnosis of S/P knee replacement.  She was able to perform sit/stand, toilet, and was able to walk to bathroom c mod I and RW.  Able to perform UB/LB dressing, toilet hygiene, and grooming c mod I and RW.  Educated pt on bathing and car T/F's.  Pt has met all OT goals. Performance deficits affecting function are impaired self care skills, impaired functional mobilty, orthopedic precautions.     Rehab Prognosis:  Good; patient would benefit from acute skilled OT services to address these deficits and reach maximum level of function.       Plan:     Patient to be seen daily to address the above listed problems via self-care/home management, therapeutic activities, therapeutic exercises  · Plan of Care Expires: 12/10/19  · Plan of Care Reviewed with: patient, spouse    Subjective     Pain/Comfort:  · Pain Rating 1: (Pt did not rate)    Objective:     Communicated with: RN prior to session.  Patient found up in chair with perineural catheter, cryotherapy upon OT entry to room.    General Precautions: Standard, fall   Orthopedic Precautions:LLE weight bearing as tolerated   Braces: N/A     Occupational Performance:         Functional Mobility/Transfers:  · Patient completed Sit <> Stand Transfer with modified independence  with  rolling walker   · Patient completed Toilet Transfer Stand Pivot technique with modified independence with  rolling walker  · Functional Mobility: Pt was able to walk to bathroom c mod I and RW.    Activities of Daily Living:  · Grooming: modified independence to wash hands while standing at sink.  Pt reports brushing her teeth  this AM.  · Upper Body Dressing: modified independence to don pullover dress.  · Lower Body Dressing: modified independence to don/doff shoes.  Pt reports donning underwear this AM.  · Toileting: modified independence for toilet hygiene.      [facility] 6 Click ADL: 24    Treatment & Education:  Educated pt on bathing and car T/F's.    Patient left up in chair with all lines intact, call button in reach, RN notified and family presentEducation:      GOALS:   Multidisciplinary Problems     Occupational Therapy Goals     Not on file          Multidisciplinary Problems (Resolved)        Problem: Occupational Therapy Goal    Goal Priority Disciplines Outcome Interventions   Occupational Therapy Goal   (Resolved)     OT, PT/OT Met    Description:  Goals to be met by: 12/10/19     Patient will increase functional independence with ADLs by performing:    UE Dressing with [place].  LE Dressing with Modified [place] and Assistive Devices as needed.  Grooming while standing at sink with Modified [place].  Toileting from bedside commode with Modified [place] for hygiene and clothing management.   Bathing from  shower chair/bench with Modified [place].  Toilet transfer to bedside commode with Modified [place].                      Time Tracking:     OT Date of Treatment: 12/10/19  OT Start Time: 1120  OT Stop Time: 1150  OT Total Time (min): 30 min    Billable Minutes:Self Care/Home Management 15  Therapeutic Activity 15    MACARIO Leigh  12/10/2019

## 2019-12-10 NOTE — PT/OT/SLP PROGRESS
Physical Therapy Treatment and Discharge     Patient Name:  Evy Mendoza   MRN:  [id number]    Recommendations:     Discharge Recommendations:  home health PT   Discharge Equipment Recommendations: none   Barriers to discharge: None    Assessment:     Evy Mendoza is a 74 y.o. female admitted with a medical diagnosis of S/P knee replacement.  She presents with the following impairments/functional limitations:  weakness, impaired functional mobilty, gait instability, impaired balance, decreased lower extremity function, decreased ROM, impaired joint extensibility, pain, impaired skin, orthopedic precautions.    Patient tolerated PT session well. Patient ambulated 100ft x2 trials with RW and supervision. She ascended/descended 1 curb step with RW and CGA. She performed L LE therex 2x10. She is ready to discharge home from PT standpoint.     Rehab Prognosis: Good; patient would benefit from acute skilled PT services to address these deficits and reach maximum level of function.    Recent Surgery: Procedure(s) (LRB):  ARTHROPLASTY, KNEE, TOTAL (Left) 1 Day Post-Op    Plan:     During this hospitalization, patient to be seen daily to address the identified rehab impairments via gait training, therapeutic activities, therapeutic exercises and progress toward the following goals:    · Plan of Care Expires:  12/13/19    Subjective     Chief Complaint: Pain in left knee with activity.   Patient/Family Comments/goals: To walk without pain and be able to ride her stationary bike.   Pain/Comfort:  · Pain Rating 1: 5/10  · Location - Side 1: Left  · Location - Orientation 1: generalized  · Location 1: knee  · Pain Addressed 1: Pre-medicate for activity, Distraction, Cessation of Activity, Nurse notified  · Pain Rating Post-Intervention 1: 5/10      Objective:     Communicated with RN prior to session.  Patient found up in chair with cryotherapy, perineural catheter, FCD upon PT entry to room.  present  "throughout PT session.     General Precautions: Standard, fall   Orthopedic Precautions:LLE weight bearing as tolerated   Braces: N/A     Functional Mobility:  · Mat Mobility:     · Supine to Sit: supervision  · Sit to Supine: supervision  · Transfers:     · Sit to Stand:  supervision with rolling walker x1 from bedside chair and x1 from mat with verbal cues for hand placement   · Gait: Patient ambulated [id number] trials with Rolling Walker and supervision using 3-point gait. Patient demonstrated decreased morales and decreased step length during gait due to pain, decreased ROM and decreased strength. Verbal cues provided for gait sequence and RW management.   · Stairs:  Pt ascended/descended 6" curb step with Rolling Walker with Contact Guard Assistance. Verbal cues provided for technique.       AM-PAC 6 CLICK MOBILITY  Turning over in bed (including adjusting bedclothes, sheets and blankets)?: 4  Sitting down on and standing up from a chair with arms (e.g., wheelchair, bedside commode, etc.): 4  Moving from lying on back to sitting on the side of the bed?: 4  Moving to and from a bed to a chair (including a wheelchair)?: 4  Need to walk in hospital room?: 4  Climbing 3-5 steps with a railing?: 4  Basic Mobility Total Score: 24       Therapeutic Activities and Exercises:  Patient educated in and performed L LE exercises 2x10 for ankle pumps, quad set, glute set, SAQ over bolster, heel slides, hip abd/add, SLR, and LAQ.    Patient and spouse educated in:  -PT role and POC  -safety with transfers including hand placement  -gait sequencing and RW management  -OOB activity to maximize recovery including ambulating every hour to hour and a half at home   -car transfer  -curb training with RW  -HEP for therex at home with handout provided  -polar ice wear schedule and management       Patient left up in chair with all lines intact, call button in reach, RN notified and mother present.    GOALS:   Multidisciplinary Problems "     Physical Therapy Goals        Problem: Physical Therapy Goal    Goal Priority Disciplines Outcome Goal Variances Interventions   Physical Therapy Goal     PT, PT/OT Ongoing, Progressing     Description:  Goals to be met by: 19    Patient will increase functional independence with mobility by performin. Supine to sit with Modified [place]  2. Sit to stand transfer with Modified [place]  3. Gait x250 feet with Modified [place] using Rolling Walker  4. Ascend/Descend 6 inch curb step with Stand-by Assistance using Rolling Walker  5. Lower extremity exercise program x30 reps per handout, with supervision                      Time Tracking:     PT Received On: 12/10/19  PT Start Time: 1035     PT Stop Time: 1118  PT Total Time (min): 43 min     Billable Minutes: Gait Training 15 Therapeutic Activity 13 and Therapeutic Exercise 15    Treatment Type: Treatment  PT/PTA: PT       Janny Fuentes, PT  12/10/2019

## 2019-12-10 NOTE — PLAN OF CARE
Patient A/Ox4. Patient in bed resting at this time. Bed locked and in the lowest position. Spouse at the bedside. Patient instructed to call for assistance. Call light in reach. Q2 hour rounds made. Noted 20 g to right hand infusing NS at 150, site CDI. Polar Ice in place, place on the left knee. Ropivacaine infusing 8ml/hr. Site CDI. Dressing to the left knee is CDI.  Walker and commode at the bedside. Patient up to the restroom as tolerated. Pain managed with prn pain medication. Safety maintained, no fall noted. Will continue to observe and follow the current plan of care.

## 2019-12-10 NOTE — SUBJECTIVE & OBJECTIVE
"Principal Problem:S/P knee replacement    Principal Orthopedic Problem: same    Interval History: NAEON.  Pain well controlled.  Feeling well.  Walked well with PT yesterday.    Review of patient's allergies indicates:   Allergen Reactions    Coffee Other (See Comments)     congestion    Cottonseed Other (See Comments)     congestion    Cely Other (See Comments)     congestion    Lettuce Other (See Comments)     congestion    Onion Other (See Comments)     congestion       Current Facility-Administered Medications   Medication    0.9%  NaCl infusion    acetaminophen tablet 1,000 mg    aspirin EC tablet 81 mg    bisacodyl suppository 10 mg    carBAMazepine tablet 200 mg    celecoxib capsule 200 mg    famotidine tablet 20 mg    losartan-hydrochlorothiazide 50-12.5 mg per tablet 1 tablet    metoprolol succinate (TOPROL-XL) 24 hr tablet 50 mg    morphine injection 2 mg    morphine injection 2 mg    mupirocin 2 % ointment 1 g    naloxone 0.4 mg/mL injection 0.02 mg    naloxone 0.4 mg/mL injection 0.02 mg    ondansetron injection 4 mg    oxyCODONE immediate release tablet 10 mg    oxyCODONE immediate release tablet 5 mg    polyethylene glycol packet 17 g    pregabalin capsule 75 mg    promethazine (PHENERGAN) 6.25 mg in dextrose 5 % 50 mL IVPB    ropivacaine (PF) 2 mg/ml (0.2%) infusion    ropivacaine 0.2% ON-Q C-BLOC 400 ML (SELECT A FLOW)    senna-docusate 8.6-50 mg per tablet 1 tablet     Objective:     Vital Signs (Most Recent):  Temp: 98.2 °F (36.8 °C) (12/10/19 0332)  Pulse: 77 (12/10/19 0531)  Resp: 20 (12/10/19 0335)  BP: 124/68 (12/10/19 0332)  SpO2: 95 % (12/10/19 0510) Vital Signs (24h Range):  Temp:  [97.3 °F (36.3 °C)-98.2 °F (36.8 °C)] 98.2 °F (36.8 °C)  Pulse:  [61-86] 77  Resp:  [16-24] 20  SpO2:  [93 %-100 %] 95 %  BP: (106-149)/(54-85) 124/68     Weight: 86.6 kg (191 lb)  Height: 5' 3" (160 cm)  Body mass index is 33.83 kg/m².      Intake/Output Summary (Last 24 hours) at " 12/10/2019 0632  Last data filed at 12/10/2019 0624  Gross per 24 hour   Intake 5740.33 ml   Output 140 ml   Net 5600.33 ml       Ortho/SPM Exam   Physical Exam:  NAD, A/O x 3.  Wound c/d/i with clean dressing.  No focal motor or sensory deficits noted.    Significant Labs: BMP: No results for input(s): GLU, NA, K, CL, CO2, BUN, CREATININE, CALCIUM, MG in the last 48 hours.  CBC: No results for input(s): WBC, HGB, HCT, PLT in the last 48 hours.  All pertinent labs within the past 24 hours have been reviewed.    Significant Imaging: I have reviewed all pertinent imaging results/findings.

## 2019-12-10 NOTE — PLAN OF CARE
Pt pain was controlled with ordered pain meds PRN, instructed pt to call for assistance, VSS, NAD noted.

## 2019-12-10 NOTE — PLAN OF CARE
Problem: Occupational Therapy Goal  Goal: Occupational Therapy Goal  Description  Goals to be met by: 12/10/19     Patient will increase functional independence with ADLs by performing:    UE Dressing with [place].  LE Dressing with Modified [place] and Assistive Devices as needed.  Grooming while standing at sink with Modified [place].  Toileting from bedside commode with Modified [place] for hygiene and clothing management.   Bathing from  shower chair/bench with Modified [place].  Toilet transfer to bedside commode with Modified [place].     Outcome: Met

## 2019-12-10 NOTE — PLAN OF CARE
Per [facility] [facility], the patient chose [facility].  Patient choice form signed.

## 2019-12-10 NOTE — PROGRESS NOTES
[facility]  Orthopedics  Progress Note    Patient Name: Evy Mendoza  MRN: [id number]  Admission Date: 12/9/2019  Hospital Length of Stay: 1 days  Attending Provider: [facility] Jr., MD  Primary Care Provider: Rakesh Devine MD  Follow-up For: Procedure(s) (LRB):  ARTHROPLASTY, KNEE, TOTAL (Left)    Post-Operative Day: 1 Day Post-Op  Subjective:     Principal Problem:S/P knee replacement    Principal Orthopedic Problem: same    Interval History: NAEON.  Pain well controlled.  Feeling well.  Walked well with PT yesterday.    Review of patient's allergies indicates:   Allergen Reactions    Coffee Other (See Comments)     congestion    Cottonseed Other (See Comments)     congestion    Cely Other (See Comments)     congestion    Lettuce Other (See Comments)     congestion    Onion Other (See Comments)     congestion       Current Facility-Administered Medications   Medication    0.9%  NaCl infusion    acetaminophen tablet 1,000 mg    aspirin EC tablet 81 mg    bisacodyl suppository 10 mg    carBAMazepine tablet 200 mg    celecoxib capsule 200 mg    famotidine tablet 20 mg    losartan-hydrochlorothiazide 50-12.5 mg per tablet 1 tablet    metoprolol succinate (TOPROL-XL) 24 hr tablet 50 mg    morphine injection 2 mg    morphine injection 2 mg    mupirocin 2 % ointment 1 g    naloxone 0.4 mg/mL injection 0.02 mg    naloxone 0.4 mg/mL injection 0.02 mg    ondansetron injection 4 mg    oxyCODONE immediate release tablet 10 mg    oxyCODONE immediate release tablet 5 mg    polyethylene glycol packet 17 g    pregabalin capsule 75 mg    promethazine (PHENERGAN) 6.25 mg in dextrose 5 % 50 mL IVPB    ropivacaine (PF) 2 mg/ml (0.2%) infusion    ropivacaine 0.2% ON-Q C-BLOC 400 ML (SELECT A FLOW)    senna-docusate 8.6-50 mg per tablet 1 tablet     Objective:     Vital Signs (Most Recent):  Temp: 98.2 °F (36.8 °C) (12/10/19 0332)  Pulse: 77 (12/10/19 0531)  Resp: 20  "(12/10/19 0335)  BP: 124/68 (12/10/19 0332)  SpO2: 95 % (12/10/19 0510) Vital Signs (24h Range):  Temp:  [97.3 °F (36.3 °C)-98.2 °F (36.8 °C)] 98.2 °F (36.8 °C)  Pulse:  [61-86] 77  Resp:  [16-24] 20  SpO2:  [93 %-100 %] 95 %  BP: (106-149)/(54-85) 124/68     Weight: 86.6 kg (191 lb)  Height: 5' 3" (160 cm)  Body mass index is 33.83 kg/m².      Intake/Output Summary (Last 24 hours) at 12/10/2019 0632  Last data filed at 12/10/2019 0624  Gross per 24 hour   Intake 5740.33 ml   Output 140 ml   Net 5600.33 ml       Ortho/SPM Exam   Physical Exam:  NAD, A/O x 3.  Wound c/d/i with clean dressing.  No focal motor or sensory deficits noted.    Significant Labs: BMP: No results for input(s): GLU, NA, K, CL, CO2, BUN, CREATININE, CALCIUM, MG in the last 48 hours.  CBC: No results for input(s): WBC, HGB, HCT, PLT in the last 48 hours.  All pertinent labs within the past 24 hours have been reviewed.    Significant Imaging: I have reviewed all pertinent imaging results/findings.    Assessment/Plan:     * S/P knee replacement  Evy Mendoza is a 74 y.o. female s/p TKA on 12/9    - WBAT  - PT/OT  - Pain control  - abx: ronna-op  - DVT PPx: asa  - Sequential Foot Compression Devices to be worn at all times when not ambulating.   - home today            Stefan Izaguirre MD  Orthopedics  [facility]  "

## 2019-12-10 NOTE — PROGRESS NOTES
Acute Pain Service and Perioperative Surgical Home Progress Note    HPI  Evy Mendoza is a 74 y.o., female.     Interval history  Used and tolerated home CPAP overnight. Pain to the anterior medial left knee. PRN meds used. No issues overnight.     Surgery:  Procedure(s) (LRB):  ARTHROPLASTY, KNEE, TOTAL (Left)    Post Op Day #: 1    Catheter type: Perineural Adductor Canal    Infusion type: Ropivacaine 0.2%  8 ml/hr basal    Problem List:    Active Hospital Problems    Diagnosis  POA    *S/P knee replacement [Z96.659]  Not Applicable      Resolved Hospital Problems   No resolved problems to display.       Subjective:       General appearance of alert, oriented, no complaints   Pain with rest: 8    Numbers   Pain with movement: 8    Numbers   Side Effects    1. Pruritis No    2. Nausea No    3. Motor Blockade No, 0=Ability to raise lower extremities off bed    4. Sedation No, 1=awake and alert    Schedule Medications:    acetaminophen  1,000 mg Oral Q6H    aspirin  81 mg Oral BID    carBAMazepine  200 mg Oral BID    celecoxib  200 mg Oral Daily    famotidine  20 mg Oral BID    losartan-hydrochlorothiazide 50-12.5 mg  1 tablet Oral Daily    metoprolol succinate  50 mg Oral Daily    mupirocin  1 g Nasal BID    polyethylene glycol  17 g Oral Daily    pregabalin  75 mg Oral QHS    senna-docusate 8.6-50 mg  1 tablet Oral BID        Continuous Infusions:   sodium chloride 0.9% 150 mL/hr at 12/09/19 2017    ropivacaine (PF) 2 mg/ml (0.2%) 8 mL/hr (12/09/19 2014)    ropivacaine          PRN Medications:  bisacodyl, morphine, morphine, naloxone, naloxone, ondansetron, oxyCODONE, oxyCODONE, promethazine (PHENERGAN) IVPB, ropivacaine       Antibiotics:  Antibiotics (From admission, onward)    Start     Stop Route Frequency Ordered    12/09/19 1215  mupirocin 2 % ointment 1 g      12/14 0859 Nasl 2 times daily 12/09/19 1208             Objective:     Catheter site clean, dry, intact          Vital Signs  (Most Recent):  Temp: 98.2 °F (36.8 °C) (12/10/19 0332)  Pulse: 77 (12/10/19 0531)  Resp: 20 (12/10/19 0335)  BP: 124/68 (12/10/19 0332)  SpO2: 97 % (12/10/19 0335) Vital Signs Range (Last 24H):  Temp:  [97.3 °F (36.3 °C)-98.2 °F (36.8 °C)]   Pulse:  [61-86]   Resp:  [15-24]   BP: (106-149)/(54-85)   SpO2:  [93 %-100 %]          I & O (Last 24H):    Intake/Output Summary (Last 24 hours) at 12/10/2019 0601  Last data filed at 12/9/2019 1502  Gross per 24 hour   Intake 3264 ml   Output 140 ml   Net 3124 ml       Physical Exam:    GA: Alert, comfortable, no acute distress.   Pulmonary: Clear to auscultation A/P/L. No wheezing, crackles, or rhonchi.  Cardiac: RRR S1 & S2 w/o rubs/murmurs/gallops.   Abdominal:Bowel sounds present. No tenderness to palpation or distension. No appreciable hepatosplenomegaly.   Skin: No jaundice, rashes, or visible lesions.         Laboratory:  CBC: No results for input(s): WBC, RBC, HGB, HCT, PLT, MCV, MCH, MCHC in the last 72 hours.    BMP: No results for input(s): NA, K, CO2, CL, BUN, CREATININE, GLU, MG, PHOS, CALCIUM in the last 72 hours.    No results for input(s): PT, INR, PROTIME, APTT in the last 72 hours.      Anticoagulant dose ASA 81mg at 12/9/2019 @ 2021.    Assessment:         Pain control adequate    Plan:     1) Pain:    Adductor canal perineural catheter in place and infusing. Good level. Multimodal pain regimen with acetaminophen, Celebrex, Lyrica, and prn oxycodone given  Will continue to monitor. Plan to discharge with On-Q on POD #1.   2) Hypertension: BP well maintained overnight. Continue home medications.    3) Hyperlipidemia: Continue home medications.    4) COLTEN: Home CPAP well tolerated overnight.    5) FEN/GI: Tolerating liquids, advance diet as tolerated. Passing flatus. No BM.   6) Dispo: Pt working well with PT/OT. Case management and SW for placement. Plan for discharge POD #1.        Evaluator MICHAEL Pascal   I have seen the patient, reviewed the Nurse  Practitioner's assessment and plan. I have personally interviewed and examined the patient at bedside and: agree with the findings.   Pain well controlled with multimodals; d/c home today with ONQ  Tolerating po well  Meeting PT/OT goals  D/c home today  Pt doing well

## 2019-12-10 NOTE — PLAN OF CARE
Patient tolerated PT session well. Patient ambulated 100ft x2 trials with RW and supervision. She ascended/descended 1 curb step with RW and CGA. She performed L LE therex 2x10. She is ready to discharge home from PT standpoint.     Problem: Physical Therapy Goal  Goal: Physical Therapy Goal  Description  Goals to be met by: 19    Patient will increase functional independence with mobility by performin. Supine to sit with Modified [place]  2. Sit to stand transfer with Modified [place]  3. Gait x250 feet with Modified [place] using Rolling Walker  4. Ascend/Descend 6 inch curb step with Stand-by Assistance using Rolling Walker  5. Lower extremity exercise program x30 reps per handout, with supervision     Outcome: Ongoing, Progressing

## 2019-12-10 NOTE — NURSING
Discharge instructions and Q ball instructions given to patient and spouse, patient and spouse verbalize understanding, patient have a rolling walker, bedside commode and discharge medications for home use.

## 2019-12-11 ENCOUNTER — TELEPHONE (OUTPATIENT)
Dept: ORTHOPEDICS | Facility: CLINIC | Age: 74
End: 2019-12-11

## 2019-12-11 PROCEDURE — G0180 PR HOME HEALTH MD CERTIFICATION: ICD-10-PCS | Mod: ,,, | Performed by: ORTHOPAEDIC SURGERY

## 2019-12-11 PROCEDURE — G0180 MD CERTIFICATION HHA PATIENT: HCPCS | Mod: ,,, | Performed by: ORTHOPAEDIC SURGERY

## 2019-12-11 NOTE — TELEPHONE ENCOUNTER
----- Message from Pako Madrid sent at 12/11/2019  3:31 PM CST -----  Contact: [facility]  Would like a callback in regards to physical therapy protocol for pt        Callback [phone]    Per discharge notes  Routine TK protocol   [facility] said she did well 2 her first visit

## 2019-12-11 NOTE — DISCHARGE SUMMARY
[facility]  Orthopedics  Discharge Summary      Patient Name: Evy Mendoza  MRN: [id number]  Admission Date: 12/9/2019  Hospital Length of Stay: 1 days  Discharge Date and Time:  12/11/2019 6:56 AM  Attending Physician: No att. providers found   Discharging Provider: Stefan Izaguirre MD  Primary Care Provider: Rakesh Devine MD    HPI: Pt admitted after elective procedure.      Procedure(s) (LRB):  ARTHROPLASTY, KNEE, TOTAL (Left)      Hospital Course: Pt was admitted s/p above for monitoring and pain control. The day after surgery pt was tolerating regular diet, pain was controlled on PO pain medication, and pt was dicharged with f/u in 2 weeks.        Significant Diagnostic Studies: Labs: BMP: No results for input(s): GLU, NA, K, CL, CO2, BUN, CREATININE, CALCIUM, MG in the last 48 hours.    Pending Diagnostic Studies:     None        Final Active Diagnoses:    Diagnosis Date Noted POA    PRINCIPAL PROBLEM:  S/P knee replacement [Z96.659] 12/09/2019 Not Applicable      Problems Resolved During this Admission:      Discharged Condition: good    Disposition: Home or Self Care    Follow Up:f/u as scheduled; clinic will call to confirm      Patient Instructions:      Ambulatory referral to Home Health   Referral Priority: Routine Referral Type: Home Health   Referral Reason: Specialty Services Required   Requested Specialty: Home Health Services   Number of Visits Requested: 1     Activity as tolerated     Call MD for:  difficulty breathing, headache or visual disturbances     Call MD for:  extreme fatigue     Call MD for:  hives     Call MD for:  persistent dizziness or light-headedness     Call MD for:  persistent nausea and vomiting     Call MD for:  redness, tenderness, or signs of infection (pain, swelling, redness, odor or green/yellow discharge around incision site)     Call MD for:  severe uncontrolled pain     Call MD for:  temperature >100.4     Keep surgical extremity elevated      Leave dressing on - Keep it clean, dry, and intact until clinic visit   Order Comments: Do not remove surgical dressing for 2 weeks post-op. This will be done only by MD at initial post-op visit. If dressing is completely saturated, replace with identical dressing - silver-impregnated hydrocolloid dressing.     Do not get dressings wet. Do not shower.     If dressing continues to be saturated or there are signs of infection, please call [facility] [phone] for further instructions and to make appt to be seen.     Lifting restrictions   Order Comments: No strenuous exercise or lifting of > 10 lbs     No driving, operating heavy equipment or signing legal documents while taking pain medication     Sponge bath only until clinic visit     Weight bearing as tolerated     Call MD for:  temperature >100.4     Call MD for:  persistent nausea and vomiting or diarrhea     Call MD for:  severe uncontrolled pain     Call MD for:  redness, tenderness, or signs of infection (pain, swelling, redness, odor or green/yellow discharge around incision site)     Call MD for:  difficulty breathing or increased cough     Call MD for:  severe persistent headache     Call MD for:  worsening rash     Call MD for:  persistent dizziness, light-headedness, or visual disturbances     Leave dressing on - Keep it clean, dry, and intact until clinic visit     EKG 12-lead   Standing Status: Future Number of Occurrences: 1 Standing Exp. Date: 11/27/20     Order Specific Question Answer Comments   Diagnosis Pre-op testing [072139]      Medications:  Reconciled Home Medications:      Medication List      CHANGE how you take these medications    Elmiron 100 mg Cap  Generic drug:  pentosan polysulfate  TAKE 1 CAPSULE THREE TIMES DAILY  What changed:  how much to take        CONTINUE taking these medications    * acetaminophen 325 MG tablet  Commonly known as:  TYLENOL  Take 325 mg by mouth every 6 (six) hours as needed for Pain.     *  acetaminophen 650 MG Tbsr  Commonly known as:  TYLENOL  Take 1 tablet (650 mg total) by mouth every 8 (eight) hours as needed.     albuterol 2.5 mg /3 mL (0.083 %) nebulizer solution  Commonly known as:  PROVENTIL  INHALE THE CONTENTS OF 1 VIAL VIA NEBULIZER FOUR TIMES A DAY AS NEEDED FOR WHEEZING     alendronate 70 MG tablet  Commonly known as:  FOSAMAX  Take 1 tablet (70 mg total) by mouth every 7 days.     aspirin 81 MG EC tablet  Commonly known as:  ECOTRIN  Take 1 tablet (81 mg total) by mouth 2 (two) times daily.     Calcium 500 + D 500 mg(1,250mg) -200 unit per tablet  Generic drug:  calcium-vitamin D3  Take 1 tablet by mouth once daily at 6am.     carBAMazepine 200 mg tablet  Commonly known as:  TEGRETOL  TAKE 1 TABLET TWICE DAILY     celecoxib 200 MG capsule  Commonly known as:  CeleBREX  Take 1 capsule (200 mg total) by mouth once daily.     ciclopirox 8 % Soln  Commonly known as:  PENLAC  APPLY TOPICALLY NIGHTLY.     CINNAMON ORAL  Take by mouth.     cyanocobalamin 500 MCG tablet  Take 500 mcg by mouth once daily.     * Stool Softener 100 MG capsule  Generic drug:  docusate sodium  Take 1 capsule (100 mg total) by mouth 2 (two) times daily as needed for Constipation.     * docusate sodium 100 MG capsule  Commonly known as:  COLACE  Take 100 mg by mouth once daily.     EYE LUBRICANT OPHT  Apply to eye.     ezetimibe 10 mg tablet  Commonly known as:  ZETIA  Take 1 tablet (10 mg total) by mouth once daily.     fish oil-omega-3 fatty acids 300-1,000 mg capsule  Take 1 g by mouth once daily.     Flonase 50 mcg/actuation nasal spray  Generic drug:  fluticasone propionate  1 spray by Nasal route as needed for Allergies.     Fluad 0637-4846 (65 yr up)(PF) 45 mcg (15 mcg x 3)/0.5 mL Syrg  Generic drug:  flu vac 2019 65up-[id number](PF)  ADM 0.5ML IM UTD     GLUCOSAMINE CHONDROITIN ORAL  Take by mouth.     glucosamine-chondroitin 500-400 mg tablet  Take 1 tablet by mouth once daily at 6am.     lidocaine 5 %  Commonly  known as:  LIDODERM  Place 1 patch onto the skin once daily. Remove & Discard patch within 12 hours or as directed by MD     lidocaine-prilocaine cream  Commonly known as:  EMLA  Apply topically 2 (two) times daily as needed.     losartan-hydrochlorothiazide 50-12.5 mg 50-12.5 mg per tablet  Commonly known as:  HYZAAR  TAKE 1 TABLET EVERY DAY     metoprolol succinate 50 MG 24 hr tablet  Commonly known as:  TOPROL-XL  TAKE 1/2 TABLET (25MG) BY MOUTH ONE TIME DAILY     mupirocin 2 % ointment  Commonly known as:  BACTROBAN  Apply topically 3 (three) times daily.     NEOSPORIN (JAI-YZR-DBXMN) TOP  Apply topically nightly. Left big toe     One Daily Multivitamin per tablet  Generic drug:  multivitamin  Take 1 tablet by mouth once daily.     oxyCODONE 5 MG immediate release tablet  Commonly known as:  ROXICODONE  Take 1-2 tabs by mouth every 4-6 hours as needed for pain     oxyquinoline-sod.lauryl sulfat 0.025-0.01 % Gel  Commonly known as:  Trimo-Sosa Jelly  Insert vaginally nightly three times per week.     Prempro 0.3-1.5 mg per tablet  Generic drug:  estrogen (conjugated)-medroxyprogesterone 0.3-1.5 mg  Take 1 tablet by mouth once daily.     RABEprazole 20 mg tablet  Commonly known as:  ACIPHEX  TAKE 1 TABLET EVERY DAY     Shingrix (PF) 50 mcg/0.5 mL injection  Generic drug:  varicella-zoster gE-AS01B (PF)  ADM 0.5ML IM UTD     simvastatin 20 MG tablet  Commonly known as:  ZOCOR  Take 1 tablet (20 mg total) by mouth every evening.     traMADol 50 mg tablet  Commonly known as:  ULTRAM  TAKE ONE TABLET BY MOUTH TWICE DAILY     turmeric root extract 500 mg Cap  Take by mouth.     UNABLE TO FIND  medication name: Herbevision     VITAMIN C ORAL  Take by mouth.     ZyrTEC 10 MG tablet  Generic drug:  cetirizine  Take 5 mg by mouth as needed for Allergies.         * This list has 4 medication(s) that are the same as other medications prescribed for you. Read the directions carefully, and ask your doctor or other care provider  to review them with you.                Stefan Izaguirre MD  Orthopedics  [facility]

## 2019-12-12 NOTE — NURSING
12/12-  Patient called at home.     Discussed discontinuation of perineural catheter, pt states that the HH Nurse removed it without any issues and that  Catheter removed and blue tip in tact.  All questions answered.

## 2019-12-23 ENCOUNTER — TELEPHONE (OUTPATIENT)
Dept: ORTHOPEDICS | Facility: CLINIC | Age: 74
End: 2019-12-23

## 2019-12-23 DIAGNOSIS — Z96.652 STATUS POST LEFT KNEE REPLACEMENT: ICD-10-CM

## 2019-12-23 RX ORDER — OXYCODONE HYDROCHLORIDE 5 MG/1
TABLET ORAL
Qty: 28 TABLET | Refills: 0 | Status: SHIPPED | OUTPATIENT
Start: 2019-12-23 | End: 2019-12-31 | Stop reason: ALTCHOICE

## 2019-12-23 NOTE — TELEPHONE ENCOUNTER
----- Message from James Powers sent at 12/23/2019  9:00 AM CST -----  Contact: pt   Please call pt at [phone] or [phone]    Refill request for oxyCODONE (ROXICODONE) 5 MG immediate release tablet    Use [id number] - [place], [place] - [address] [address] AT [facility] [address] [place] [address] [phone] (Phone)  [phone] (Fax)    Thank you

## 2019-12-24 ENCOUNTER — OFFICE VISIT (OUTPATIENT)
Dept: ORTHOPEDICS | Facility: CLINIC | Age: 74
End: 2019-12-24
Payer: MEDICARE

## 2019-12-24 VITALS
TEMPERATURE: 98 F | HEART RATE: 79 BPM | SYSTOLIC BLOOD PRESSURE: 132 MMHG | HEIGHT: 63 IN | WEIGHT: 187.5 LBS | DIASTOLIC BLOOD PRESSURE: 82 MMHG | BODY MASS INDEX: 33.22 KG/M2

## 2019-12-24 DIAGNOSIS — Z96.652 S/P TOTAL KNEE REPLACEMENT USING CEMENT, LEFT: Primary | ICD-10-CM

## 2019-12-24 PROCEDURE — 99999 PR PBB SHADOW E&M-EST. PATIENT-LVL III: ICD-10-PCS | Mod: PBBFAC,,, | Performed by: NURSE PRACTITIONER

## 2019-12-24 PROCEDURE — 99024 PR POST-OP FOLLOW-UP VISIT: ICD-10-PCS | Mod: S$GLB,,, | Performed by: NURSE PRACTITIONER

## 2019-12-24 PROCEDURE — 99024 POSTOP FOLLOW-UP VISIT: CPT | Mod: S$GLB,,, | Performed by: NURSE PRACTITIONER

## 2019-12-24 PROCEDURE — 99999 PR PBB SHADOW E&M-EST. PATIENT-LVL III: CPT | Mod: PBBFAC,,, | Performed by: NURSE PRACTITIONER

## 2019-12-24 NOTE — PROGRESS NOTES
"Evy Mendoza presents for initial post-operative visit following a left total knee arthroplasty performed by Dr. [facility] on 12/9/2019. Tolerating pain medication well.      Exam:   Blood pressure 132/82, pulse 79, temperature 97.5 °F (36.4 °C), temperature source Oral, height 5' 3" (1.6 m), weight 85.1 kg (187 lb 8 oz).   Ambulating well with assistive device.  Incision is clean and dry without drainage or erythema. ROM:0-100    Initial post-operative radiographs reviewed today revealing a well fixed and aligned prosthesis.    A/P:  2 weeks s/p left total knee replacement  Dr. [facility] interviewed and examined patient today and agrees with plan.   - The patient was advised to keep the incision clean and dry for the next 24 hours after which she may wash the area with antibacterial soap in the shower. Will not submerge until the incision is completely healed.   - [facility] [place]  - Continue aspirin for 1 month from surgery.  - Pain medication refilled  - Follow up in 4 weeks with Dr. [facility]. Pt will call clinic with problems/concerns.     "

## 2019-12-26 RX ORDER — LIDOCAINE 50 MG/G
1 PATCH TOPICAL DAILY
Qty: 90 PATCH | Refills: 1 | Status: SHIPPED | OUTPATIENT
Start: 2019-12-26 | End: 2020-02-06 | Stop reason: SDUPTHER

## 2019-12-26 NOTE — TELEPHONE ENCOUNTER
----- Message from Stephania Hyatt sent at 2019  2:22 PM CST -----  Contact: PATIENT  Evy Mendoza  MRN: [id number]  : 1945  PCP: Rakesh Devine  Home Phone      [phone]  Work Phone      Not on file.  Mobile          [phone]      MESSAGE: Patient would like to see if Dr. Clinton prescribe the Lidocaine pain patch that she has prescribed for her in the past, if so please send a 3 month supply to [facility].        Phone: [phone]

## 2019-12-31 DIAGNOSIS — Z96.659 STATUS POST KNEE REPLACEMENT, UNSPECIFIED LATERALITY: Primary | ICD-10-CM

## 2019-12-31 RX ORDER — HYDROCODONE BITARTRATE AND ACETAMINOPHEN 5; 325 MG/1; MG/1
1 TABLET ORAL EVERY 6 HOURS PRN
Qty: 28 TABLET | Refills: 0 | Status: SHIPPED | OUTPATIENT
Start: 2019-12-31 | End: 2020-01-10

## 2020-01-02 ENCOUNTER — EXTERNAL HOME HEALTH (OUTPATIENT)
Dept: HOME HEALTH SERVICES | Facility: HOSPITAL | Age: 75
End: 2020-01-02
Payer: MEDICARE

## 2020-01-06 ENCOUNTER — EXTERNAL HOME HEALTH (OUTPATIENT)
Dept: HOME HEALTH SERVICES | Facility: HOSPITAL | Age: 75
End: 2020-01-06
Payer: MEDICARE

## 2020-01-13 ENCOUNTER — TELEPHONE (OUTPATIENT)
Dept: ORTHOPEDICS | Facility: CLINIC | Age: 75
End: 2020-01-13

## 2020-01-13 RX ORDER — OXYCODONE HYDROCHLORIDE 5 MG/1
5 TABLET ORAL EVERY 6 HOURS PRN
Qty: 30 TABLET | Refills: 0 | Status: SHIPPED | OUTPATIENT
Start: 2020-01-13 | End: 2021-03-02 | Stop reason: ALTCHOICE

## 2020-01-13 NOTE — TELEPHONE ENCOUNTER
----- Message from James Powers sent at 1/13/2020 11:18 AM CST -----  Contact: pt   Please call pt at [phone] or [phone]    Requesting a refill for oxyCODONE (ROXICODONE) 5 MG immediate release tablet    Use [id number] - [place], [place] - [address] [facility] AT [facility] [address] [place] [address] [phone] (Phone)  [phone] (Fax)    Thank you     Informed Mr Sierra that her medication was refilled by Dr [facility]

## 2020-01-13 NOTE — TELEPHONE ENCOUNTER
----- Message from Lokesh Resendez sent at 1/13/2020  2:35 PM CST -----  Contact: self  Pt states right above the incision it was red and has been red for a while now. Pt states her PT remove a scab and also a suture where the stitching was and it is still red. Pt would like a call regarding some advice on what to do with this matter.    Contact Info  cell     we spoke  The suture spit was last Tuesday , still red  Angry,  appt booked for tomorrow

## 2020-01-14 ENCOUNTER — OFFICE VISIT (OUTPATIENT)
Dept: ORTHOPEDICS | Facility: CLINIC | Age: 75
End: 2020-01-14
Payer: MEDICARE

## 2020-01-14 VITALS — WEIGHT: 188.06 LBS | HEIGHT: 63 IN | BODY MASS INDEX: 33.32 KG/M2

## 2020-01-14 DIAGNOSIS — Z96.652 STATUS POST TOTAL LEFT KNEE REPLACEMENT: Primary | ICD-10-CM

## 2020-01-14 PROCEDURE — 99999 PR PBB SHADOW E&M-EST. PATIENT-LVL III: ICD-10-PCS | Mod: PBBFAC,,, | Performed by: ORTHOPAEDIC SURGERY

## 2020-01-14 PROCEDURE — 99024 POSTOP FOLLOW-UP VISIT: CPT | Mod: S$GLB,,, | Performed by: ORTHOPAEDIC SURGERY

## 2020-01-14 PROCEDURE — 99999 PR PBB SHADOW E&M-EST. PATIENT-LVL III: CPT | Mod: PBBFAC,,, | Performed by: ORTHOPAEDIC SURGERY

## 2020-01-14 PROCEDURE — 99024 PR POST-OP FOLLOW-UP VISIT: ICD-10-PCS | Mod: S$GLB,,, | Performed by: ORTHOPAEDIC SURGERY

## 2020-01-14 RX ORDER — CEFADROXIL 500 MG/1
500 CAPSULE ORAL EVERY 12 HOURS
Qty: 28 CAPSULE | Refills: 0 | Status: SHIPPED | OUTPATIENT
Start: 2020-01-14 | End: 2020-01-28

## 2020-01-14 NOTE — PROGRESS NOTES
Status post left total knee 12/09/2019  Patient has a proximal wound irritation.  There is a large suture spit.  No prominent mass or fluid.    Plan  Will treat this suture spit with Duricef x2 weeks.  Return to clinic in 2 weeks for wound check.

## 2020-01-28 ENCOUNTER — OFFICE VISIT (OUTPATIENT)
Dept: ORTHOPEDICS | Facility: CLINIC | Age: 75
End: 2020-01-28
Payer: MEDICARE

## 2020-01-28 VITALS — WEIGHT: 186.38 LBS | HEIGHT: 63 IN | BODY MASS INDEX: 33.02 KG/M2

## 2020-01-28 DIAGNOSIS — Z96.651 STATUS POST TOTAL RIGHT KNEE REPLACEMENT: Primary | ICD-10-CM

## 2020-01-28 PROCEDURE — 99024 POSTOP FOLLOW-UP VISIT: CPT | Mod: S$GLB,,, | Performed by: ORTHOPAEDIC SURGERY

## 2020-01-28 PROCEDURE — 99999 PR PBB SHADOW E&M-EST. PATIENT-LVL II: ICD-10-PCS | Mod: PBBFAC,,, | Performed by: ORTHOPAEDIC SURGERY

## 2020-01-28 PROCEDURE — 99024 PR POST-OP FOLLOW-UP VISIT: ICD-10-PCS | Mod: S$GLB,,, | Performed by: ORTHOPAEDIC SURGERY

## 2020-01-28 PROCEDURE — 99999 PR PBB SHADOW E&M-EST. PATIENT-LVL II: CPT | Mod: PBBFAC,,, | Performed by: ORTHOPAEDIC SURGERY

## 2020-02-06 ENCOUNTER — PROCEDURE VISIT (OUTPATIENT)
Dept: NEUROLOGY | Facility: CLINIC | Age: 75
End: 2020-02-06
Payer: MEDICARE

## 2020-02-06 DIAGNOSIS — G24.3 CERVICAL DYSTONIA: Primary | ICD-10-CM

## 2020-02-06 DIAGNOSIS — B02.29 POST HERPETIC NEURALGIA: ICD-10-CM

## 2020-02-06 PROCEDURE — 64616 PR CHEMODENERVATION NECK MUSCLES EXC LARNYNX, UNI: ICD-10-PCS | Mod: 50,S$GLB,, | Performed by: PSYCHIATRY & NEUROLOGY

## 2020-02-06 PROCEDURE — 64616 CHEMODENERV MUSC NECK DYSTON: CPT | Mod: 50,S$GLB,, | Performed by: PSYCHIATRY & NEUROLOGY

## 2020-02-06 RX ORDER — LIDOCAINE 50 MG/G
1 PATCH TOPICAL DAILY
Qty: 90 PATCH | Refills: 3 | Status: SHIPPED | OUTPATIENT
Start: 2020-02-06 | End: 2021-12-08

## 2020-02-06 NOTE — PROCEDURES
Procedures     BOTOX PROCEDURE NOTE     Date of Procedure:2/6/2020    Reason for Procedure: Cervical dystonia       Informed consent was obtained prior to performing this study. Two patient identifiers were confirmed with the patient prior to performing this study. A time out to determine correct patient and and agreement on procedure performed was conducted prior to the injections.     Procedure Details: After informed consent obtained the patient's head and upper neck was cleansed with alcohol rub and 300 Units of Botox (diluted 1:1) was injected in the following bilateral muscles:   50 units in each Trapezius*  40 units in each Levator Scapulae*  20 units in each Splenius Capitus*  20 units in each Longissimus*  10 units in each Spenius Cervices*  10 units in each Semispinalis Capitus*    *= denotes bilateral injections    The patient tolerated the procedure well with less than 0.25cc of blood loss. She was observed for several minutes post injection and given a handout from [employer] regarding when and where to seek help if side effects are experienced.    RTC in 4 months for more Botox which continues to  her symptoms of neck pain and dystonia and she feels she can continue to keep injections at 4 months intervals at this point (neck pain returned prior to this appointment)  She follows with Dr ADAM Liang for lumbar injection PRN- has not returned recently as she is using tramadol PRN with PCP instead  and had updated MRI L spine as requested by Dr Dejesus which was largely unchanged and he did not recommend surgery in 2018  Trigeminal neuralgia pain improved as documented prior and PCP follows this and labs  MRI C spine 1/2018 showed mild spinal stenosis per report    Asking for lidocaine patch for her 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 Rx done.

## 2020-02-14 ENCOUNTER — TELEPHONE (OUTPATIENT)
Dept: NEUROLOGY | Facility: CLINIC | Age: 75
End: 2020-02-14

## 2020-02-14 NOTE — TELEPHONE ENCOUNTER
----- Message from Stephania Hyatt sent at 2020  1:10 PM CST -----  Contact: PATIENT  Evy Mendoza  MRN: [id number]  : 1945  PCP: Rakesh Devine  Home Phone      [phone]  Work Phone      Not on file.  Mobile          [phone]      MESSAGE: Patient states that the Lidocaine patch was denied by her insurance company.        Phone: [phone]

## 2020-02-19 PROBLEM — R11.2 NAUSEA AND VOMITING: Status: RESOLVED | Noted: 2019-12-02 | Resolved: 2020-02-19

## 2020-02-19 PROBLEM — G47.33 OBSTRUCTIVE SLEEP APNEA SYNDROME: Status: ACTIVE | Noted: 2019-12-02

## 2020-03-10 ENCOUNTER — OFFICE VISIT (OUTPATIENT)
Dept: ORTHOPEDICS | Facility: CLINIC | Age: 75
End: 2020-03-10
Payer: MEDICARE

## 2020-03-10 VITALS — WEIGHT: 186.19 LBS | BODY MASS INDEX: 31.79 KG/M2 | HEIGHT: 64 IN

## 2020-03-10 DIAGNOSIS — Z96.652 STATUS POST TOTAL LEFT KNEE REPLACEMENT: Primary | ICD-10-CM

## 2020-03-10 PROCEDURE — 99999 PR PBB SHADOW E&M-EST. PATIENT-LVL III: ICD-10-PCS | Mod: PBBFAC,,, | Performed by: ORTHOPAEDIC SURGERY

## 2020-03-10 PROCEDURE — 99024 PR POST-OP FOLLOW-UP VISIT: ICD-10-PCS | Mod: S$GLB,,, | Performed by: ORTHOPAEDIC SURGERY

## 2020-03-10 PROCEDURE — 99999 PR PBB SHADOW E&M-EST. PATIENT-LVL III: CPT | Mod: PBBFAC,,, | Performed by: ORTHOPAEDIC SURGERY

## 2020-03-10 PROCEDURE — 99024 POSTOP FOLLOW-UP VISIT: CPT | Mod: S$GLB,,, | Performed by: ORTHOPAEDIC SURGERY

## 2020-03-10 NOTE — PROGRESS NOTES
Patient is here today for 12 week PO FU of her TKA on 12/9/19. She is progressing well.      We will allow her to return as needed for repeat radiographs and certainly for any other questions.    xrays are  reviewed today with good alignment.    We will check repeat radiographs in 1 year.

## 2020-03-19 DIAGNOSIS — M81.0 AGE-RELATED OSTEOPOROSIS WITHOUT CURRENT PATHOLOGICAL FRACTURE: ICD-10-CM

## 2020-03-19 NOTE — TELEPHONE ENCOUNTER
Please let patient know we are postponing any unneccessary visits to the hospital right now to please finish Fosamax and call back in 2-3 months and we an get her set up for the Prolia injections.

## 2020-03-19 NOTE — TELEPHONE ENCOUNTER
Patient states that at her last visit she discussed stopping Fosamax and starting Prolia injections. Patient states that she wanted to finish the Fosamax that she had on hand from her mail order pharmacy prior to changing. Patient states that she has 1 box of the Fosamax left which will last 4 weeks so she wanted to see what needed to be done. Please advise.

## 2020-03-19 NOTE — TELEPHONE ENCOUNTER
Evy galindo refill of fosamax LRF:10/18   LV:11/19  Patient Active Problem List   Diagnosis    Cervical dystonia    Age-related osteoporosis without current pathological fracture    Essential (primary) hypertension    Hypercholesteremia    Trigeminal neuralgia    Chronic idiopathic constipation    Menopause    Knee arthropathy    Spinal stenosis of lumbar region at multiple levels    Acute pain of left knee    Multiple food allergies    Acid reflux    Interstitial cystitis    Obstructive sleep apnea syndrome    Hormone replacement therapy (HRT)    Female bladder prolapse    Personal history of spine surgery    Family history of diabetes mellitus    Family history of thrombosis    Onychomycosis    Status post left knee replacement 12/9/2019    Abnormal EKG-12/2/2019    S/P knee replacement     Prior to Admission medications    Medication Sig Start Date End Date Taking? Authorizing Provider   acetaminophen (TYLENOL) 325 MG tablet Take 325 mg by mouth every 6 (six) hours as needed for Pain.    Historical Provider, MD   acetaminophen (TYLENOL) 650 MG TbSR Take 1 tablet (650 mg total) by mouth every 8 (eight) hours as needed. 12/6/19   Anna De La Torre PA-C   albuterol (PROVENTIL) 2.5 mg /3 mL (0.083 %) nebulizer solution INHALE THE CONTENTS OF 1 VIAL VIA NEBULIZER FOUR TIMES A DAY AS NEEDED FOR WHEEZING 3/15/19   Historical Provider, MD   alendronate (FOSAMAX) 70 MG tablet Take 1 tablet (70 mg total) by mouth every 7 days. 10/5/18   Marisa Mcdowell MD   ascorbic acid (VITAMIN C ORAL) Take by mouth.    Historical Provider, MD   aspirin (ECOTRIN) 81 MG EC tablet Take 1 tablet (81 mg total) by mouth 2 (two) times daily. 12/6/19 1/5/20  Anna De La Torre PA-C   calcium-vitamin D3 (CALCIUM 500 + D) 500 mg(1,250mg) -200 unit per tablet Take 1 tablet by mouth once daily at 6am.     Historical Provider, MD   carBAMazepine (TEGRETOL) 200 mg tablet TAKE 1 TABLET TWICE DAILY 10/17/19   Rakesh CORDERO  MD Sybil   cetirizine (ZYRTEC) 10 MG tablet Take 5 mg by mouth as needed for Allergies.  12/15/11   Historical Provider, MD   ciclopirox (PENLAC) 8 % Soln APPLY TOPICALLY NIGHTLY. 9/13/19 12/12/19  Rakesh Devine MD   cinnamon bark (CINNAMON ORAL) Take by mouth.    Historical Provider, MD   cyanocobalamin 500 MCG tablet Take 500 mcg by mouth once daily.    Historical Provider, MD   docusate sodium (COLACE) 100 MG capsule Take 100 mg by mouth once daily.    Historical Provider, MD   docusate sodium (COLACE) 100 MG capsule Take 1 capsule (100 mg total) by mouth 2 (two) times daily as needed for Constipation. 12/6/19   Anna De La Torre PA-C   ELMIRON 100 mg Cap TAKE 1 CAPSULE THREE TIMES DAILY  Patient taking differently: Take 100 mg by mouth 3 (three) times daily.  10/15/19   Rakesh Devine MD   ezetimibe (ZETIA) 10 mg tablet TAKE 1 TABLET (10 MG TOTAL) BY MOUTH ONCE DAILY. 1/28/20   Rakesh Devine MD   fish oil-omega-3 fatty acids 300-1,000 mg capsule Take 1 g by mouth once daily.     Historical Provider, MD   FLUAD 3527-0664, 65 YR UP,,PF, 45 mcg (15 mcg x 3)/0.5 mL Syrg ADM 0.5ML IM UTD 11/4/19   Historical Provider, MD   fluticasone (FLONASE) 50 mcg/actuation nasal spray 1 spray by Nasal route as needed for Allergies.  12/15/11   Historical Provider, MD   glucos sul 2KCl/msm/chond/C/Mn (GLUCOSAMINE CHONDROITIN ORAL) Take by mouth.    Historical Provider, MD   glucosamine-chondroitin 500-400 mg tablet Take 1 tablet by mouth once daily at 6am.     Historical Provider, MD   lanolin/mineral oil/petrolatum (EYE LUBRICANT OPHT) Apply to eye.    Historical Provider, MD   lidocaine (LIDODERM) 5 % Place 1 patch onto the skin once daily. Remove & Discard patch within 12 hours or as directed by MD 2/6/20   Terry Clinton MD   lidocaine-prilocaine (EMLA) cream Apply topically 2 (two) times daily as needed. 2/21/19   Terry Clinton MD   losartan-hydrochlorothiazide 50-12.5 mg (HYZAAR) 50-12.5 mg  per tablet TAKE 1 TABLET EVERY DAY 9/2/19   Rakesh Devine MD   metoprolol succinate (TOPROL-XL) 50 MG 24 hr tablet TAKE 1/2 TABLET (25MG) BY MOUTH ONE TIME DAILY  10/17/19   Rakesh Devine MD   multivitamin (ONE DAILY MULTIVITAMIN) per tablet Take 1 tablet by mouth once daily.    Historical Provider, MD   mupirocin (BACTROBAN) 2 % ointment Apply topically 3 (three) times daily. 1/29/19   Rakesh Devine MD   neomycin/bacitracin/polymyxinB (NEOSPORIN, SUG-FVT-XYEXX, TOP) Apply topically nightly. Left big toe    Historical Provider, MD   oxyCODONE (ROXICODONE) 5 MG immediate release tablet Take 1 tablet (5 mg total) by mouth every 6 (six) hours as needed for Pain. 1/13/20   [facility] L. [facility] Jr., MD   oxyquinoline-sod.lauryl sulfat (TRIMO-GRIGGS JELLY) 0.025-0.01 % Gel Insert vaginally nightly three times per week. 5/1/18   Chula Sal MD   PREMPRO 0.3-1.5 mg per tablet Take 1 tablet by mouth once daily. 11/13/19   Marisa Mcdowell MD   RABEprazole (ACIPHEX) 20 mg tablet TAKE 1 TABLET EVERY DAY 10/22/19   Rakesh Devine MD   SHINGRIX, PF, 50 mcg/0.5 mL injection ADM 0.5ML IM UTD 8/15/18   Historical Provider, MD   simvastatin (ZOCOR) 20 MG tablet TAKE 1 TABLET (20 MG TOTAL) BY MOUTH EVERY EVENING. 1/28/20   Rakesh Devine MD   traMADol (ULTRAM) 50 mg tablet TAKE ONE TABLET BY MOUTH TWICE DAILY 12/4/19   Rakesh Devine MD   turmeric root extract 500 mg Cap Take by mouth.    Historical Provider, MD   UNABLE TO FIND medication name: Herbevision    Historical Provider, MD

## 2020-03-19 NOTE — TELEPHONE ENCOUNTER
"----- Message from Deedee Dao sent at 3/19/2020  9:16 AM CDT -----  Contact: Self  Evy Mendoza  MRN: [id number]  Home Phone      [phone]  Work Phone      Not on file.  Mobile          [phone]    Patient Care Team:  Rakesh Devine MD as PCP - General (Internal Medicine)  Marisa Mcdowell MD (Obstetrics and Gynecology)  OB? No  What phone number can you be reached at? [phone]  Message:   Pt would like to discuss a medication that she is stopping and switching to a "shot two times a year". Pt unsure of name of medication. Please advise.  "

## 2020-03-20 RX ORDER — ALENDRONATE SODIUM 70 MG/1
70 TABLET ORAL
Qty: 12 TABLET | Refills: 3 | OUTPATIENT
Start: 2020-03-20

## 2020-04-01 DIAGNOSIS — M81.0 AGE-RELATED OSTEOPOROSIS WITHOUT CURRENT PATHOLOGICAL FRACTURE: ICD-10-CM

## 2020-04-01 RX ORDER — ALENDRONATE SODIUM 70 MG/1
70 TABLET ORAL
Qty: 12 TABLET | Refills: 0 | Status: CANCELLED | OUTPATIENT
Start: 2020-04-01

## 2020-04-01 NOTE — TELEPHONE ENCOUNTER
----- Message from Deedee Dao sent at 4/1/2020 10:20 AM CDT -----  Contact: Self  Evy Mendoza  MRN: [id number]  Home Phone      [phone]  Work Phone      Not on file.  Mobile          [phone]    Patient Care Team:  Rakesh Devine MD as PCP - General (Internal Medicine)  Marisa Mcdowell MD (Obstetrics and Gynecology)  OB? No  What phone number can you be reached at? [phone]  Message:   Pt has question about medication.

## 2020-04-01 NOTE — TELEPHONE ENCOUNTER
Pt notified of recommendations to stop Fosamax. Can continue with injections once able to. Pt voiced understanding.

## 2020-04-01 NOTE — TELEPHONE ENCOUNTER
Evy desire refill of Alendronate 70mg until she is able to switch over to prolia.   Patient Active Problem List   Diagnosis    Cervical dystonia    Age-related osteoporosis without current pathological fracture    Essential (primary) hypertension    Hypercholesteremia    Trigeminal neuralgia    Chronic idiopathic constipation    Menopause    Knee arthropathy    Spinal stenosis of lumbar region at multiple levels    Acute pain of left knee    Multiple food allergies    Acid reflux    Interstitial cystitis    Obstructive sleep apnea syndrome    Hormone replacement therapy (HRT)    Female bladder prolapse    Personal history of spine surgery    Family history of diabetes mellitus    Family history of thrombosis    Onychomycosis    Status post left knee replacement 12/9/2019    Abnormal EKG-12/2/2019    S/P knee replacement     Prior to Admission medications    Medication Sig Start Date End Date Taking? Authorizing Provider   acetaminophen (TYLENOL) 325 MG tablet Take 325 mg by mouth every 6 (six) hours as needed for Pain.    Historical Provider, MD   acetaminophen (TYLENOL) 650 MG TbSR Take 1 tablet (650 mg total) by mouth every 8 (eight) hours as needed. 12/6/19   Anna De La Torre PA-C   albuterol (PROVENTIL) 2.5 mg /3 mL (0.083 %) nebulizer solution INHALE THE CONTENTS OF 1 VIAL VIA NEBULIZER FOUR TIMES A DAY AS NEEDED FOR WHEEZING 3/15/19   Historical Provider, MD   alendronate (FOSAMAX) 70 MG tablet Take 1 tablet (70 mg total) by mouth every 7 days. 10/5/18   Mraisa Mcdowell MD   ascorbic acid (VITAMIN C ORAL) Take by mouth.    Historical Provider, MD   aspirin (ECOTRIN) 81 MG EC tablet Take 1 tablet (81 mg total) by mouth 2 (two) times daily. 12/6/19 1/5/20  Anna De La Torre PA-C   calcium-vitamin D3 (CALCIUM 500 + D) 500 mg(1,250mg) -200 unit per tablet Take 1 tablet by mouth once daily at 6am.     Historical Provider, MD   carBAMazepine (TEGRETOL) 200 mg tablet TAKE 1  TABLET TWICE DAILY 10/17/19   Rakesh Devine MD   cetirizine (ZYRTEC) 10 MG tablet Take 5 mg by mouth as needed for Allergies.  12/15/11   Historical Provider, MD   ciclopirox (PENLAC) 8 % Soln APPLY TOPICALLY NIGHTLY. 9/13/19 12/12/19  Rakesh Devine MD   cinnamon bark (CINNAMON ORAL) Take by mouth.    Historical Provider, MD   cyanocobalamin 500 MCG tablet Take 500 mcg by mouth once daily.    Historical Provider, MD   docusate sodium (COLACE) 100 MG capsule Take 100 mg by mouth once daily.    Historical Provider, MD   docusate sodium (COLACE) 100 MG capsule Take 1 capsule (100 mg total) by mouth 2 (two) times daily as needed for Constipation. 12/6/19   Anna De La Torre PA-C   ELMIRON 100 mg Cap TAKE 1 CAPSULE THREE TIMES DAILY  Patient taking differently: Take 100 mg by mouth 3 (three) times daily.  10/15/19   Rakesh Devine MD   ezetimibe (ZETIA) 10 mg tablet TAKE 1 TABLET (10 MG TOTAL) BY MOUTH ONCE DAILY. 1/28/20   Rakesh Devine MD   fish oil-omega-3 fatty acids 300-1,000 mg capsule Take 1 g by mouth once daily.     Historical Provider, MD   FLUAD 4802-1589, 65 YR UP,,PF, 45 mcg (15 mcg x 3)/0.5 mL Syrg ADM 0.5ML IM UTD 11/4/19   Historical Provider, MD   fluticasone (FLONASE) 50 mcg/actuation nasal spray 1 spray by Nasal route as needed for Allergies.  12/15/11   Historical Provider, MD   glucos sul 2KCl/msm/chond/C/Mn (GLUCOSAMINE CHONDROITIN ORAL) Take by mouth.    Historical Provider, MD   glucosamine-chondroitin 500-400 mg tablet Take 1 tablet by mouth once daily at 6am.     Historical Provider, MD   lanolin/mineral oil/petrolatum (EYE LUBRICANT OPHT) Apply to eye.    Historical Provider, MD   lidocaine (LIDODERM) 5 % Place 1 patch onto the skin once daily. Remove & Discard patch within 12 hours or as directed by MD 2/6/20   Terry Clinton MD   lidocaine-prilocaine (EMLA) cream Apply topically 2 (two) times daily as needed. 2/21/19   Terry Clinton MD    losartan-hydrochlorothiazide 50-12.5 mg (HYZAAR) 50-12.5 mg per tablet TAKE 1 TABLET EVERY DAY 9/2/19   Rakesh Devine MD   metoprolol succinate (TOPROL-XL) 50 MG 24 hr tablet TAKE 1/2 TABLET (25MG) BY MOUTH ONE TIME DAILY  10/17/19   Rakesh Devine MD   multivitamin (ONE DAILY MULTIVITAMIN) per tablet Take 1 tablet by mouth once daily.    Historical Provider, MD   mupirocin (BACTROBAN) 2 % ointment Apply topically 3 (three) times daily. 1/29/19   Rakesh Devine MD   neomycin/bacitracin/polymyxinB (NEOSPORIN, [id number], TOP) Apply topically nightly. Left big toe    Historical Provider, MD   oxyCODONE (ROXICODONE) 5 MG immediate release tablet Take 1 tablet (5 mg total) by mouth every 6 (six) hours as needed for Pain. 1/13/20   John L. Ochsner Jr., MD   oxyquinoline-sod.lauryl sulfat (TRIMO-GRIGGS JELLY) 0.025-0.01 % Gel Insert vaginally nightly three times per week. 5/1/18   Chula Sal MD   PREMPRO 0.3-1.5 mg per tablet Take 1 tablet by mouth once daily. 11/13/19   Marisa Mcdowell MD   RABEprazole (ACIPHEX) 20 mg tablet TAKE 1 TABLET EVERY DAY 10/22/19   Rakesh Devine MD   SHINGRIX, PF, 50 mcg/0.5 mL injection ADM 0.5ML IM UTD 8/15/18   Historical Provider, MD   simvastatin (ZOCOR) 20 MG tablet TAKE 1 TABLET (20 MG TOTAL) BY MOUTH EVERY EVENING. 1/28/20   Rakesh Devine MD   traMADol (ULTRAM) 50 mg tablet TAKE ONE TABLET BY MOUTH TWICE DAILY 12/4/19   Rakesh Devine MD   turmeric root extract 500 mg Cap Take by mouth.    Historical Provider, MD   UNABLE TO FIND medication name: Herbevision    Historical Provider, MD

## 2020-04-01 NOTE — TELEPHONE ENCOUNTER
Please let patient know per our discussion at last visit, she has been on it for many years and needs to take a break. Its ok for her to be on nothing right now.

## 2020-06-04 ENCOUNTER — PROCEDURE VISIT (OUTPATIENT)
Dept: NEUROLOGY | Facility: CLINIC | Age: 75
End: 2020-06-04
Payer: MEDICARE

## 2020-06-04 DIAGNOSIS — G24.3 CERVICAL DYSTONIA: Primary | ICD-10-CM

## 2020-06-04 PROCEDURE — 64616 PR CHEMODENERVATION NECK MUSCLES EXC LARNYNX, UNI: ICD-10-PCS | Mod: 50,S$GLB,, | Performed by: PSYCHIATRY & NEUROLOGY

## 2020-06-04 PROCEDURE — 64616 CHEMODENERV MUSC NECK DYSTON: CPT | Mod: 50,S$GLB,, | Performed by: PSYCHIATRY & NEUROLOGY

## 2020-06-04 NOTE — PROCEDURES
Procedures   BOTOX PROCEDURE NOTE     Date of Procedure:6/4/2020    Reason for Procedure: Cervical dystonia       Informed consent was obtained prior to performing this study. Two patient identifiers were confirmed with the patient prior to performing this study. A time out to determine correct patient and and agreement on procedure performed was conducted prior to the injections.     Procedure Details: After informed consent obtained the patient's head and upper neck was cleansed with alcohol rub and 300 Units of Botox (diluted 1:1) was injected in the following bilateral muscles:   50 units in each Trapezius*  40 units in each Levator Scapulae*  20 units in each Splenius Capitus*  20 units in each Longissimus*  10 units in each Spenius Cervices*  10 units in each Semispinalis Capitus*    *= denotes bilateral injections    The patient tolerated the procedure well with less than 0.25cc of blood loss. She was observed for several minutes post injection and given a handout from [employer] regarding when and where to seek help if side effects are experienced.    RTC in 4 months for more Botox which continues to  her symptoms of neck pain and dystonia and she feels she can continue to keep injections at 4 months(neck pain returned prior to 4 months appoinmments  She sees Dr ADAM Liang for lumbar injection PRN-  she is using tramadol PRN with PCP instead  and had updated MRI L spine as requested by Dr Dejesus which was largely unchanged and he did not recommend surgery in 2018  Trigeminal neuralgia pain improved as documented prior and PCP follows this and labs  MRI C spine 1/2018 showed mild spinal stenosis per report     Insurance denied lidocaine patch for her 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 Is using OTC topical agents PRN instead

## 2020-06-16 ENCOUNTER — TELEPHONE (OUTPATIENT)
Dept: OBSTETRICS AND GYNECOLOGY | Facility: CLINIC | Age: 75
End: 2020-06-16

## 2020-06-16 NOTE — TELEPHONE ENCOUNTER
Patient is questioning if she is able to start Prolia at this time. Stopped Fosamax 03/2020 from continued use. Last BMD 11/2019, Osteopenia.

## 2020-06-16 NOTE — TELEPHONE ENCOUNTER
The fosamax will stay in her system for awhile. I suggest we just wait until her next visit to discuss. We will repeat her DEXA and see what changes have occurred.

## 2020-06-16 NOTE — TELEPHONE ENCOUNTER
----- Message from Elo Solis sent at 6/16/2020  8:42 AM CDT -----  Evy Mendoza  MRN: [id number]  Home Phone      [phone]  Work Phone      Not on file.  Mobile          [phone]    Patient Care Team:  Rakesh Devine MD as PCP - General (Internal Medicine)  Marisa Mcdowell MD (Obstetrics and Gynecology)  OB? No  What phone number can you be reached at? [phone]  Message:  Pt states was told to stop taking the alendronate (FOSAMAX) 70 MG tablet that she would starting getting injections. Pt states that she never heard anything further on getting an injection due to covid.

## 2020-06-22 PROBLEM — Z78.0 MENOPAUSE: Chronic | Status: ACTIVE | Noted: 2018-05-22

## 2020-06-22 PROBLEM — G47.33 OBSTRUCTIVE SLEEP APNEA SYNDROME: Chronic | Status: ACTIVE | Noted: 2019-12-02

## 2020-10-08 ENCOUNTER — PROCEDURE VISIT (OUTPATIENT)
Dept: NEUROLOGY | Facility: CLINIC | Age: 75
End: 2020-10-08
Payer: MEDICARE

## 2020-10-08 DIAGNOSIS — G24.3 CERVICAL DYSTONIA: Primary | ICD-10-CM

## 2020-10-08 PROCEDURE — 64616 CHEMODENERV MUSC NECK DYSTON: CPT | Mod: 50,S$GLB,, | Performed by: PSYCHIATRY & NEUROLOGY

## 2020-10-08 PROCEDURE — 64616 PR CHEMODENERVATION NECK MUSCLES EXC LARNYNX, UNI: ICD-10-PCS | Mod: 50,S$GLB,, | Performed by: PSYCHIATRY & NEUROLOGY

## 2020-10-08 NOTE — PROCEDURES
Procedures     BOTOX PROCEDURE NOTE     Date of Procedure:10/8/2020    Reason for Procedure: Cervical dystonia       Informed consent was obtained prior to performing this study. Two patient identifiers were confirmed with the patient prior to performing this study. A time out to determine correct patient and and agreement on procedure performed was conducted prior to the injections.     Procedure Details: After informed consent obtained the patient's head and upper neck was cleansed with alcohol rub and 300 Units of Botox (diluted 1:1) was injected in the following bilateral muscles:   50 units in each Trapezius*  40 units in each Levator Scapulae*  20 units in each Splenius Capitus*  20 units in each Longissimus*  10 units in each Spenius Cervices*  10 units in each Semispinalis Capitus*    *= denotes bilateral injections    The patient tolerated the procedure well with less than 0.25cc of blood loss. She was observed for several minutes post injection and given a handout from [employer] regarding when and where to seek help if side effects are experienced.    RTC in 4 months for more Botox which continues to  her symptoms of neck pain and dystonia and she feels she can continue to keep injections at 4 months(neck pain returned prior to 4 months appointments and Botox continues to help)  She sees Dr ADAM Liang for lumbar injection PRN-  she is using tramadol PRN with PCP instead  and had updated MRI L spine as requested by Dr Dejesus which was largely unchanged and he did not recommend surgery in 2018  Trigeminal neuralgia pain improved as documented prior and PCP follows this and labs  MRI C spine 1/2018 showed mild spinal stenosis per report     Insurance denied lidocaine patch for her 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 Is using OTC topical agents PRN instead

## 2020-11-11 ENCOUNTER — TELEPHONE (OUTPATIENT)
Dept: ORTHOPEDICS | Facility: CLINIC | Age: 75
End: 2020-11-11

## 2020-11-11 ENCOUNTER — OFFICE VISIT (OUTPATIENT)
Dept: ORTHOPEDICS | Facility: CLINIC | Age: 75
End: 2020-11-11
Payer: MEDICARE

## 2020-11-11 ENCOUNTER — HOSPITAL ENCOUNTER (OUTPATIENT)
Dept: RADIOLOGY | Facility: HOSPITAL | Age: 75
Discharge: HOME | End: 2020-11-11
Attending: ORTHOPAEDIC SURGERY
Payer: MEDICARE

## 2020-11-11 VITALS — HEIGHT: 64 IN | BODY MASS INDEX: 32.56 KG/M2 | WEIGHT: 190.69 LBS

## 2020-11-11 DIAGNOSIS — Z96.659 TOTAL KNEE REPLACEMENT STATUS, UNSPECIFIED LATERALITY: Primary | ICD-10-CM

## 2020-11-11 DIAGNOSIS — M47.816 LUMBAR ARTHROPATHY: Primary | ICD-10-CM

## 2020-11-11 DIAGNOSIS — Z96.659 TOTAL KNEE REPLACEMENT STATUS, UNSPECIFIED LATERALITY: ICD-10-CM

## 2020-11-11 PROCEDURE — 99999 PR PBB SHADOW E&M-EST. PATIENT-LVL IV: ICD-10-PCS | Mod: PBBFAC,,, | Performed by: ORTHOPAEDIC SURGERY

## 2020-11-11 PROCEDURE — 1125F PR PAIN SEVERITY QUANTIFIED, PAIN PRESENT: ICD-10-PCS | Mod: S$GLB,,, | Performed by: ORTHOPAEDIC SURGERY

## 2020-11-11 PROCEDURE — 73560 X-RAY EXAM OF KNEE 1 OR 2: CPT | Mod: 26,59,RT, | Performed by: RADIOLOGY

## 2020-11-11 PROCEDURE — 1101F PR PT FALLS ASSESS DOC 0-1 FALLS W/OUT INJ PAST YR: ICD-10-PCS | Mod: CPTII,S$GLB,, | Performed by: ORTHOPAEDIC SURGERY

## 2020-11-11 PROCEDURE — 99999 PR PBB SHADOW E&M-EST. PATIENT-LVL IV: CPT | Mod: PBBFAC,,, | Performed by: ORTHOPAEDIC SURGERY

## 2020-11-11 PROCEDURE — 73560 XR KNEE ORTHO LEFT: ICD-10-PCS | Mod: 26,59,RT, | Performed by: RADIOLOGY

## 2020-11-11 PROCEDURE — 73562 XR KNEE ORTHO LEFT: ICD-10-PCS | Mod: 26,LT,, | Performed by: RADIOLOGY

## 2020-11-11 PROCEDURE — 73562 X-RAY EXAM OF KNEE 3: CPT | Mod: 26,LT,, | Performed by: RADIOLOGY

## 2020-11-11 PROCEDURE — 1101F PT FALLS ASSESS-DOCD LE1/YR: CPT | Mod: CPTII,S$GLB,, | Performed by: ORTHOPAEDIC SURGERY

## 2020-11-11 PROCEDURE — 1159F PR MEDICATION LIST DOCUMENTED IN MEDICAL RECORD: ICD-10-PCS | Mod: S$GLB,,, | Performed by: ORTHOPAEDIC SURGERY

## 2020-11-11 PROCEDURE — 73560 X-RAY EXAM OF KNEE 1 OR 2: CPT | Mod: TC,RT,59

## 2020-11-11 PROCEDURE — 99214 OFFICE O/P EST MOD 30 MIN: CPT | Mod: S$GLB,,, | Performed by: ORTHOPAEDIC SURGERY

## 2020-11-11 PROCEDURE — 99214 PR OFFICE/OUTPT VISIT, EST, LEVL IV, 30-39 MIN: ICD-10-PCS | Mod: S$GLB,,, | Performed by: ORTHOPAEDIC SURGERY

## 2020-11-11 PROCEDURE — 1159F MED LIST DOCD IN RCRD: CPT | Mod: S$GLB,,, | Performed by: ORTHOPAEDIC SURGERY

## 2020-11-11 PROCEDURE — 1125F AMNT PAIN NOTED PAIN PRSNT: CPT | Mod: S$GLB,,, | Performed by: ORTHOPAEDIC SURGERY

## 2020-11-11 NOTE — TELEPHONE ENCOUNTER
Left a message asking Mrs Sierra to go to the Imaging center this am for her yearly xr & to do her questionair

## 2020-11-11 NOTE — PROGRESS NOTES
HPI:    Evy Mendoza is a 75 y.o. female who is here today for   Chief Complaint   Patient presents with    Post-op Evaluation     1 yr p/o l tk 12/9    .     Duration: 12 months  Intensity: mild  Character of pain: achy  Location: She reports that the pain is predominately  intermediate    Past Medical History:   Diagnosis Date    Arthritis     Cataract     Fibrocystic breast     GERD (gastroesophageal reflux disease)     Hyperlipidemia     Hypertension     Interstitial cystitis     Osteoporosis     Sleep apnea           Current Outpatient Medications:     acetaminophen (TYLENOL) 325 MG tablet, Take 325 mg by mouth every 6 (six) hours as needed for Pain., Disp: , Rfl:     acetaminophen (TYLENOL) 650 MG TbSR, Take 1 tablet (650 mg total) by mouth every 8 (eight) hours as needed., Disp: 120 tablet, Rfl: 0    ascorbic acid (VITAMIN C ORAL), Take by mouth., Disp: , Rfl:     calcium-vitamin D3 (CALCIUM 500 + D) 500 mg(1,250mg) -200 unit per tablet, Take 1 tablet by mouth once daily at 6am. , Disp: , Rfl:     carBAMazepine (TEGRETOL) 200 mg tablet, TAKE 1 TABLET TWICE DAILY, Disp: 180 tablet, Rfl: 3    cetirizine (ZYRTEC) 10 MG tablet, Take 5 mg by mouth as needed for Allergies. , Disp: , Rfl:     cinnamon bark (CINNAMON ORAL), Take by mouth., Disp: , Rfl:     cyanocobalamin 500 MCG tablet, Take 500 mcg by mouth once daily., Disp: , Rfl:     docusate sodium (COLACE) 100 MG capsule, Take 1 capsule (100 mg total) by mouth 2 (two) times daily as needed for Constipation., Disp: 60 capsule, Rfl: 0    ELMIRON 100 mg Cap, TAKE 1 CAPSULE THREE TIMES DAILY, Disp: 90 capsule, Rfl: 0    ezetimibe (ZETIA) 10 mg tablet, TAKE 1 TABLET EVERY DAY, Disp: 90 tablet, Rfl: 3    fish oil-omega-3 fatty acids 300-1,000 mg capsule, Take 1 g by mouth once daily. , Disp: , Rfl:     FLUAD 6826-8486, 65 YR UP,,PF, 45 mcg (15 mcg x 3)/0.5 mL Syrg, ADM 0.5ML IM UTD, Disp: , Rfl: 0    fluticasone (FLONASE) 50  mcg/actuation nasal spray, 1 spray by Nasal route as needed for Allergies. , Disp: , Rfl:     glucosamine-chondroitin 500-400 mg tablet, Take 1 tablet by mouth once daily at 6am. , Disp: , Rfl:     lanolin/mineral oil/petrolatum (EYE LUBRICANT OPHT), Apply to eye., Disp: , Rfl:     lidocaine (LIDODERM) 5 %, Place 1 patch onto the skin once daily. Remove & Discard patch within 12 hours or as directed by MD, Disp: 90 patch, Rfl: 3    lidocaine-prilocaine (EMLA) cream, Apply topically 2 (two) times daily as needed., Disp: 30 g, Rfl: 3    losartan-hydrochlorothiazide 50-12.5 mg (HYZAAR) 50-12.5 mg per tablet, TAKE 1 TABLET EVERY DAY, Disp: 90 tablet, Rfl: 3    metoprolol succinate (TOPROL-XL) 50 MG 24 hr tablet, TAKE 1/2 TABLET ONE TIME DAILY, Disp: 45 tablet, Rfl: 3    multivitamin (ONE DAILY MULTIVITAMIN) per tablet, Take 1 tablet by mouth once daily., Disp: , Rfl:     neomycin/bacitracin/polymyxinB (NEOSPORIN, [id number], TOP), Apply topically nightly. Left big toe, Disp: , Rfl:     oxyquinoline-sod.lauryl sulfat (TRIMO-GRIGGS JELLY) 0.025-0.01 % Gel, Insert vaginally nightly three times per week., Disp: 113.4 g, Rfl: 2    PREMPRO 0.3-1.5 mg per tablet, TAKE 1 TABLET EVERY DAY, Disp: 84 tablet, Rfl: 0    RABEprazole (ACIPHEX) 20 mg tablet, TAKE 1 TABLET EVERY DAY, Disp: 90 tablet, Rfl: 3    SHINGRIX, PF, 50 mcg/0.5 mL injection, ADM 0.5ML IM UTD, Disp: , Rfl: 0    simvastatin (ZOCOR) 20 MG tablet, TAKE 1 TABLET EVERY EVENING, Disp: 90 tablet, Rfl: 3    traMADoL (ULTRAM) 50 mg tablet, Take 1 tablet (50 mg total) by mouth 2 (two) times daily., Disp: 180 tablet, Rfl: 0    turmeric root extract 500 mg Cap, Take by mouth., Disp: , Rfl:     UNABLE TO FIND, medication name: Herbevision, Disp: , Rfl:     albuterol (PROVENTIL) 2.5 mg /3 mL (0.083 %) nebulizer solution, INHALE THE CONTENTS OF 1 VIAL VIA NEBULIZER FOUR TIMES A DAY AS NEEDED FOR WHEEZING, Disp: , Rfl: 0    alendronate (FOSAMAX) 70 MG tablet,  "Take 1 tablet (70 mg total) by mouth every 7 days. (Patient not taking: Reported on 11/11/2020), Disp: 12 tablet, Rfl: 3    aspirin (ECOTRIN) 81 MG EC tablet, Take 1 tablet (81 mg total) by mouth 2 (two) times daily., Disp: 60 tablet, Rfl: 0    ciclopirox (PENLAC) 8 % Soln, APPLY TOPICALLY NIGHTLY., Disp: 3 mL, Rfl: 0    docusate sodium (COLACE) 100 MG capsule, Take 100 mg by mouth once daily., Disp: , Rfl:     glucos sul 2KCl/msm/chond/C/Mn (GLUCOSAMINE CHONDROITIN ORAL), Take by mouth., Disp: , Rfl:     LINZESS 145 mcg Cap capsule, , Disp: , Rfl:     mupirocin (BACTROBAN) 2 % ointment, Apply topically 3 (three) times daily. (Patient not taking: Reported on 11/11/2020), Disp: 15 g, Rfl: 0    oxyCODONE (ROXICODONE) 5 MG immediate release tablet, Take 1 tablet (5 mg total) by mouth every 6 (six) hours as needed for Pain. (Patient not taking: Reported on 10/27/2020), Disp: 30 tablet, Rfl: 0     Review of patient's allergies indicates:   Allergen Reactions    Coffee Other (See Comments)     congestion    Cottonseed Other (See Comments)     congestion    Ginger Other (See Comments)     congestion    Lettuce Other (See Comments)     congestion    Onion Other (See Comments)     congestion        ROS  Constitutional: Negative for fever, Negative for weight loss  HENT Negative for congestion  Cardiovascular: Negative chest pain  Respiratory: Negative Shortness of breath  Heme: Negative excessive bleeding  Skin:NegativeItching, Negative breakdown  Musculoskeletal:Positive for back pain, Positive for joint pain, Positive muscle pain, Positive muscle weakness  Neurological: Negative for numbness and paresthesias   Psychiatric/Behavioral: Negative altered mental status, Negative for depression    Physical Exam:   Ht 5' 4" (1.626 m)   Wt 86.5 kg (190 lb 11.2 oz)   BMI 32.73 kg/m²   General appearance: This is a well-developed, Well nourished female No obvious acute distress.  Psychiatric: normal mood and affect;  " pleasant and conversant; judgment and thought content normal  Gait is coordinated. Patient has antalgic gait to the left  Cardiovascular: There are no swelling or varicosities present.   Respiratory: normal respiratory effort   Lymphatic: no adenopathy   Neurologic: alert and oriented to person, place, and time   Deep Tendon Reflexes are normal;  Coordination and Balance: Normal   Musculoskeletal   Neck    ROM shows normal flexion and extension and lateral rotation    Palpation: Non-tender    Stability is normal    Strength is normal    Skin is normal without masses and lesions    Sensation is intact to light touch   Back    ROM showsabnormal flexion, extension    and  rotation    Palpation shows no masses    Stability is normal    Strength to flexion and extension well maintained    Core strength is diminished    Skin shows no rashes or cafe au lait spots;     Sensation is intact to light touch    Right Knee  Swelling None  TendernessNone  Range of Motion: 120    Left Knee: Swelling None  TendernessNone  Range of Motion: 120    Radiograph   Left total knee in excellent position no signs of loosening or failure.    Assessment:  Patient has severe degenerative changes of the lumbar spine which creates most of her problems.  The knee is doing very well.    Plan:  Patient is free to ambulate without limitations.

## 2020-11-16 ENCOUNTER — TELEPHONE (OUTPATIENT)
Dept: OBSTETRICS AND GYNECOLOGY | Facility: CLINIC | Age: 75
End: 2020-11-16

## 2020-11-16 DIAGNOSIS — Z12.31 BREAST CANCER SCREENING BY MAMMOGRAM: Primary | ICD-10-CM

## 2020-11-16 NOTE — TELEPHONE ENCOUNTER
----- Message from Yodit Solis MA sent at 11/16/2020  9:09 AM CST -----  Contact: self  Evy Mendoza  MRN: [id number]  Home Phone      [phone]  Work Phone      Not on file.  Mobile          [phone]    Patient Care Team:  Rakesh Devine MD as PCP - General (Internal Medicine)  Marisa Mcdowell MD (Obstetrics and Gynecology)  OB? No  What phone number can you be reached at? [phone]  Message:   Please link mammo orders to appt 11/17/2020.

## 2020-11-17 ENCOUNTER — HOSPITAL ENCOUNTER (OUTPATIENT)
Dept: RADIOLOGY | Facility: HOSPITAL | Age: 75
Discharge: HOME | End: 2020-11-17
Attending: OBSTETRICS & GYNECOLOGY
Payer: MEDICARE

## 2020-11-17 ENCOUNTER — OFFICE VISIT (OUTPATIENT)
Dept: OBSTETRICS AND GYNECOLOGY | Facility: CLINIC | Age: 75
End: 2020-11-17
Payer: MEDICARE

## 2020-11-17 VITALS
WEIGHT: 191.38 LBS | SYSTOLIC BLOOD PRESSURE: 124 MMHG | HEART RATE: 91 BPM | DIASTOLIC BLOOD PRESSURE: 84 MMHG | BODY MASS INDEX: 32.85 KG/M2

## 2020-11-17 DIAGNOSIS — Z46.89 PESSARY MAINTENANCE: ICD-10-CM

## 2020-11-17 DIAGNOSIS — Z79.890 HORMONE REPLACEMENT THERAPY (HRT): ICD-10-CM

## 2020-11-17 DIAGNOSIS — Z01.419 WELL WOMAN EXAM WITH ROUTINE GYNECOLOGICAL EXAM: Primary | ICD-10-CM

## 2020-11-17 DIAGNOSIS — Z12.31 BREAST CANCER SCREENING BY MAMMOGRAM: ICD-10-CM

## 2020-11-17 DIAGNOSIS — M85.80 OSTEOPENIA DETERMINED BY X-RAY: ICD-10-CM

## 2020-11-17 DIAGNOSIS — Z12.31 ENCOUNTER FOR SCREENING MAMMOGRAM FOR MALIGNANT NEOPLASM OF BREAST: ICD-10-CM

## 2020-11-17 PROCEDURE — G0101 CA SCREEN;PELVIC/BREAST EXAM: HCPCS | Mod: S$GLB,,, | Performed by: OBSTETRICS & GYNECOLOGY

## 2020-11-17 PROCEDURE — 77067 SCR MAMMO BI INCL CAD: CPT | Mod: TC

## 2020-11-17 PROCEDURE — G0101 PR CA SCREEN;PELVIC/BREAST EXAM: ICD-10-PCS | Mod: S$GLB,,, | Performed by: OBSTETRICS & GYNECOLOGY

## 2020-11-17 PROCEDURE — 99999 PR PBB SHADOW E&M-EST. PATIENT-LVL IV: ICD-10-PCS | Mod: PBBFAC,,, | Performed by: OBSTETRICS & GYNECOLOGY

## 2020-11-17 PROCEDURE — 77063 BREAST TOMOSYNTHESIS BI: CPT | Mod: 26,,, | Performed by: RADIOLOGY

## 2020-11-17 PROCEDURE — 77067 SCR MAMMO BI INCL CAD: CPT | Mod: 26,,, | Performed by: RADIOLOGY

## 2020-11-17 PROCEDURE — 77067 MAMMO DIGITAL SCREENING BILAT WITH TOMO: ICD-10-PCS | Mod: 26,,, | Performed by: RADIOLOGY

## 2020-11-17 PROCEDURE — 99999 PR PBB SHADOW E&M-EST. PATIENT-LVL IV: CPT | Mod: PBBFAC,,, | Performed by: OBSTETRICS & GYNECOLOGY

## 2020-11-17 PROCEDURE — 77063 MAMMO DIGITAL SCREENING BILAT WITH TOMO: ICD-10-PCS | Mod: 26,,, | Performed by: RADIOLOGY

## 2020-11-17 RX ORDER — CONJUGATED ESTROGENS AND MEDROXYPROGESTERONE ACETATE .3; 1.5 MG/1; MG/1
1 TABLET, SUGAR COATED ORAL DAILY
Qty: 84 TABLET | Refills: 3 | Status: SHIPPED | OUTPATIENT
Start: 2020-11-17 | End: 2021-09-09

## 2020-11-17 NOTE — PROGRESS NOTES
Subjective:    Patient ID: Evy Mendoza is a 75 y.o. y.o. female.     Chief Complaint: Annual Well Woman Exam     History of Present Illness:  Evy presents today for Annual Well Woman exam. She describes her menses as absent.She denies pelvic pain.  She denies breast tenderness, masses, nipple discharge. She denies difficulty with urination or bowel movements. She denies menopausal symptoms such as hotflashes, vaginal dryness, and night sweats. She denies bloating, early satiety, or weight changes. She is sexually active. Contraception is by no method.    Patient doing well with pessary. Desires refill of HRT.    The following portions of the patient's history were reviewed and updated as appropriate: allergies, current medications, past family history, past medical history, past social history, past surgical history and problem list.      Menstrual History:   No LMP recorded. Patient is postmenopausal..     OB History        2    Para   2    Term   2            AB        Living   2       SAB        TAB        Ectopic        Multiple        Live Births   2                 The following portions of the patient's history were reviewed and updated as appropriate: allergies, current medications, past family history, past medical history, past social history, past surgical history and problem list.        ROS:     Review of Systems   Constitutional: Negative for activity change, appetite change, chills, diaphoresis, fatigue, fever and unexpected weight change.   HENT: Negative for mouth sores and tinnitus.    Eyes: Negative for discharge and visual disturbance.   Respiratory: Negative for cough, shortness of breath and wheezing.    Cardiovascular: Negative for chest pain, palpitations and leg swelling.   Gastrointestinal: Negative for abdominal pain, bloating, blood in stool, constipation, diarrhea, nausea and vomiting.   Endocrine: Negative for diabetes, hair loss, hot flashes,  hyperthyroidism and hypothyroidism.   Genitourinary: Negative for dysmenorrhea, dyspareunia, dysuria, flank pain, frequency, genital sores, hematuria, menorrhagia, menstrual problem, pelvic pain, urgency, vaginal bleeding, vaginal discharge, vaginal pain, postcoital bleeding and vaginal odor.   Musculoskeletal: Negative for back pain, joint swelling and myalgias.   Integumentary:  Negative for rash, acne, breast mass, nipple discharge and breast skin changes.   Neurological: Negative for seizures, syncope, numbness and headaches.   Hematological: Negative for adenopathy. Does not bruise/bleed easily.   Psychiatric/Behavioral: Negative for depression and sleep disturbance. The patient is not nervous/anxious.    Breast: Negative for mass, mastodynia, nipple discharge and skin changes      Objective:    Vital Signs:  Vitals:    11/17/20 1351   BP: 124/84   Pulse: 91   Weight: 86.8 kg (191 lb 6.4 oz)         Physical Exam:  General:  alert, cooperative, appears stated age   Skin:  Skin color, texture, turgor normal. No rashes or lesions   HEENT:  conjunctivae/corneas clear. PERRL.   Neck: supple, trachea midline, no adenopathy or thyromegally   Respiratory:  clear to auscultation bilaterally   Heart:  regular rate and rhythm, S1, S2 normal, no murmur, click, rub or gallop   Breasts:  no discharge, erythema, or tenderness   Abdomen:  normal findings: bowel sounds normal, no masses palpable, no organomegaly and soft, non-tender   Pelvis: External genitalia: normal general appearance  Urinary system: urethral meatus normal, bladder nontender  Vaginal: normal mucosa without prolapse or lesions, atrophic mucosa  Cervix: normal appearance  Uterus: normal size, shape, position  Adnexa: normal size, nontender bilaterally   Extremities: Normal ROM; no edema, no cyanosis   Neurologial: Normal strength and tone. No focal numbness or weakness. Reflexes 2+ and equal.   Psychiatric: normal mood, speech, dress, and thought processes          Assessment:       Healthy female exam.     1. Well woman exam with routine gynecological exam    2. Hormone replacement therapy (HRT)    3. Pessary maintenance    4. Encounter for screening mammogram for malignant neoplasm of breast    5. Osteopenia determined by x-ray          Plan:       pap smear deferred per guidelines    Pessary removed, cleaned, and replaced; patient does well with maintaining pessary herself  Refill HRT  Discussed weight bearing exercise, calcium, and vitamin d for osteoporosis prevention  Colonoscopy up to date per patient  DEXA scan due in 1-2 years  RTC in 1 year    COUNSELING:  Evy was counseled on STD pevention, use and side-effects of various contraceptive measures, A.C.O.G. Pap guidelines and recommendations for yearly pelvic exams in addition to recommendations for monthly self breast exams; to see her PCP for other health maintenance.

## 2020-11-25 ENCOUNTER — TELEPHONE (OUTPATIENT)
Dept: ADMINISTRATIVE | Facility: HOSPITAL | Age: 75
End: 2020-11-25

## 2020-11-30 ENCOUNTER — HOSPITAL ENCOUNTER (OUTPATIENT)
Dept: RADIOLOGY | Facility: HOSPITAL | Age: 75
Discharge: HOME | End: 2020-11-30
Attending: OBSTETRICS & GYNECOLOGY
Payer: MEDICARE

## 2020-11-30 DIAGNOSIS — R92.8 ABNORMAL MAMMOGRAM: ICD-10-CM

## 2020-11-30 PROCEDURE — 76642 ULTRASOUND BREAST LIMITED: CPT | Mod: 26,RT,, | Performed by: RADIOLOGY

## 2020-11-30 PROCEDURE — 77065 MAMMO DIGITAL DIAGNOSTIC RIGHT WITH TOMO: ICD-10-PCS | Mod: 26,RT,, | Performed by: RADIOLOGY

## 2020-11-30 PROCEDURE — 77065 DX MAMMO INCL CAD UNI: CPT | Mod: TC,RT

## 2020-11-30 PROCEDURE — 77061 BREAST TOMOSYNTHESIS UNI: CPT | Mod: TC,RT

## 2020-11-30 PROCEDURE — 77061 BREAST TOMOSYNTHESIS UNI: CPT | Mod: 26,RT,, | Performed by: RADIOLOGY

## 2020-11-30 PROCEDURE — 77061 MAMMO DIGITAL DIAGNOSTIC RIGHT WITH TOMO: ICD-10-PCS | Mod: 26,RT,, | Performed by: RADIOLOGY

## 2020-11-30 PROCEDURE — 76642 US BREAST RIGHT LIMITED: ICD-10-PCS | Mod: 26,RT,, | Performed by: RADIOLOGY

## 2020-11-30 PROCEDURE — 77065 DX MAMMO INCL CAD UNI: CPT | Mod: 26,RT,, | Performed by: RADIOLOGY

## 2020-11-30 PROCEDURE — 76642 ULTRASOUND BREAST LIMITED: CPT | Mod: TC,RT

## 2020-12-01 ENCOUNTER — TELEPHONE (OUTPATIENT)
Dept: OBSTETRICS AND GYNECOLOGY | Facility: CLINIC | Age: 75
End: 2020-12-01

## 2020-12-01 NOTE — TELEPHONE ENCOUNTER
----- Message from Guera Wiggins sent at 12/1/2020  9:21 AM CST -----  Regarding: Self  Evy Mendoza  MRN: [id number]  Home Phone      [phone]  Work Phone      Not on file.  Mobile          [phone]    Patient Care Team:  Rakesh Devine MD as PCP - General (Internal Medicine)  Marisa Mcdowell MD (Obstetrics and Gynecology)  OB? No  What phone number can you be reached at? [phone]  Message:   Pt would like mammogram and US results. Please return call.

## 2021-01-09 ENCOUNTER — IMMUNIZATION (OUTPATIENT)
Dept: FAMILY MEDICINE | Facility: CLINIC | Age: 76
End: 2021-01-09
Payer: MEDICARE

## 2021-01-09 DIAGNOSIS — Z23 NEED FOR VACCINATION: ICD-10-CM

## 2021-01-09 PROCEDURE — 91300 COVID-19, MRNA, LNP-S, PF, 30 MCG/0.3 ML DOSE VACCINE: CPT | Mod: PBBFAC | Performed by: INTERNAL MEDICINE

## 2021-01-30 ENCOUNTER — IMMUNIZATION (OUTPATIENT)
Dept: FAMILY MEDICINE | Facility: CLINIC | Age: 76
End: 2021-01-30
Payer: MEDICARE

## 2021-01-30 DIAGNOSIS — Z23 NEED FOR VACCINATION: Primary | ICD-10-CM

## 2021-01-30 PROCEDURE — 0002A COVID-19, MRNA, LNP-S, PF, 30 MCG/0.3 ML DOSE VACCINE: CPT | Mod: PBBFAC | Performed by: INTERNAL MEDICINE

## 2021-01-30 PROCEDURE — 91300 COVID-19, MRNA, LNP-S, PF, 30 MCG/0.3 ML DOSE VACCINE: CPT | Mod: PBBFAC | Performed by: INTERNAL MEDICINE

## 2021-02-11 ENCOUNTER — PROCEDURE VISIT (OUTPATIENT)
Dept: NEUROLOGY | Facility: CLINIC | Age: 76
End: 2021-02-11
Payer: MEDICARE

## 2021-02-11 DIAGNOSIS — G24.3 CERVICAL DYSTONIA: Primary | ICD-10-CM

## 2021-02-11 PROCEDURE — 64616 CHEMODENERV MUSC NECK DYSTON: CPT | Mod: 50,S$GLB,, | Performed by: PSYCHIATRY & NEUROLOGY

## 2021-02-11 PROCEDURE — 64616 PR CHEMODENERVATION NECK MUSCLES EXC LARNYNX, UNI: ICD-10-PCS | Mod: 50,S$GLB,, | Performed by: PSYCHIATRY & NEUROLOGY

## 2021-03-01 PROBLEM — G24.3 CERVICAL DYSTONIA: Status: RESOLVED | Noted: 2018-05-03 | Resolved: 2021-03-01

## 2021-06-17 ENCOUNTER — PROCEDURE VISIT (OUTPATIENT)
Dept: NEUROLOGY | Facility: CLINIC | Age: 76
End: 2021-06-17
Payer: MEDICARE

## 2021-06-17 DIAGNOSIS — G24.3 CERVICAL DYSTONIA: Primary | ICD-10-CM

## 2021-06-17 PROCEDURE — 64616 CHEMODENERV MUSC NECK DYSTON: CPT | Mod: 50,S$GLB,, | Performed by: PSYCHIATRY & NEUROLOGY

## 2021-06-17 PROCEDURE — 64616 PR CHEMODENERVATION NECK MUSCLES EXC LARNYNX, UNI: ICD-10-PCS | Mod: 50,S$GLB,, | Performed by: PSYCHIATRY & NEUROLOGY

## 2021-08-25 ENCOUNTER — TELEPHONE (OUTPATIENT)
Dept: OBSTETRICS AND GYNECOLOGY | Facility: CLINIC | Age: 76
End: 2021-08-25

## 2021-08-25 DIAGNOSIS — Z12.31 ENCOUNTER FOR SCREENING MAMMOGRAM FOR BREAST CANCER: Primary | ICD-10-CM

## 2021-11-24 ENCOUNTER — HOSPITAL ENCOUNTER (OUTPATIENT)
Dept: RADIOLOGY | Facility: HOSPITAL | Age: 76
Discharge: HOME | End: 2021-11-24
Attending: OBSTETRICS & GYNECOLOGY
Payer: MEDICARE

## 2021-11-24 DIAGNOSIS — Z12.31 ENCOUNTER FOR SCREENING MAMMOGRAM FOR BREAST CANCER: ICD-10-CM

## 2021-11-24 PROCEDURE — 77067 MAMMO DIGITAL SCREENING BILAT WITH TOMO: ICD-10-PCS | Mod: 26,,, | Performed by: RADIOLOGY

## 2021-11-24 PROCEDURE — 77067 SCR MAMMO BI INCL CAD: CPT | Mod: 26,,, | Performed by: RADIOLOGY

## 2021-11-24 PROCEDURE — 77063 BREAST TOMOSYNTHESIS BI: CPT | Mod: 26,,, | Performed by: RADIOLOGY

## 2021-11-24 PROCEDURE — 77067 SCR MAMMO BI INCL CAD: CPT | Mod: TC

## 2021-11-24 PROCEDURE — 77063 MAMMO DIGITAL SCREENING BILAT WITH TOMO: ICD-10-PCS | Mod: 26,,, | Performed by: RADIOLOGY

## 2021-11-26 ENCOUNTER — TELEPHONE (OUTPATIENT)
Dept: OBSTETRICS AND GYNECOLOGY | Facility: CLINIC | Age: 76
End: 2021-11-26
Payer: MEDICARE

## 2021-12-08 ENCOUNTER — OFFICE VISIT (OUTPATIENT)
Dept: OBSTETRICS AND GYNECOLOGY | Facility: CLINIC | Age: 76
End: 2021-12-08
Payer: MEDICARE

## 2021-12-08 VITALS
RESPIRATION RATE: 15 BRPM | HEART RATE: 82 BPM | BODY MASS INDEX: 32.16 KG/M2 | HEIGHT: 64 IN | WEIGHT: 188.38 LBS | DIASTOLIC BLOOD PRESSURE: 78 MMHG | SYSTOLIC BLOOD PRESSURE: 136 MMHG

## 2021-12-08 DIAGNOSIS — Z12.31 ENCOUNTER FOR SCREENING MAMMOGRAM FOR BREAST CANCER: ICD-10-CM

## 2021-12-08 DIAGNOSIS — M85.80 OSTEOPENIA DETERMINED BY X-RAY: ICD-10-CM

## 2021-12-08 DIAGNOSIS — Z46.89 PESSARY MAINTENANCE: ICD-10-CM

## 2021-12-08 DIAGNOSIS — N95.9 UNSPECIFIED MENOPAUSAL AND PERIMENOPAUSAL DISORDER: ICD-10-CM

## 2021-12-08 DIAGNOSIS — N39.3 SUI (STRESS URINARY INCONTINENCE, FEMALE): ICD-10-CM

## 2021-12-08 DIAGNOSIS — Z79.890 HORMONE REPLACEMENT THERAPY (HRT): ICD-10-CM

## 2021-12-08 DIAGNOSIS — Z01.419 WELL WOMAN EXAM WITH ROUTINE GYNECOLOGICAL EXAM: Primary | ICD-10-CM

## 2021-12-08 PROCEDURE — 99999 PR PBB SHADOW E&M-EST. PATIENT-LVL IV: ICD-10-PCS | Mod: PBBFAC,,, | Performed by: OBSTETRICS & GYNECOLOGY

## 2021-12-08 PROCEDURE — G0101 CA SCREEN;PELVIC/BREAST EXAM: HCPCS | Mod: S$GLB,,, | Performed by: OBSTETRICS & GYNECOLOGY

## 2021-12-08 PROCEDURE — G0101 PR CA SCREEN;PELVIC/BREAST EXAM: ICD-10-PCS | Mod: S$GLB,,, | Performed by: OBSTETRICS & GYNECOLOGY

## 2021-12-08 PROCEDURE — 99999 PR PBB SHADOW E&M-EST. PATIENT-LVL IV: CPT | Mod: PBBFAC,,, | Performed by: OBSTETRICS & GYNECOLOGY

## 2021-12-08 RX ORDER — CONJUGATED ESTROGENS AND MEDROXYPROGESTERONE ACETATE .3; 1.5 MG/1; MG/1
1 TABLET, SUGAR COATED ORAL DAILY
Qty: 90 TABLET | Refills: 3 | Status: SHIPPED | OUTPATIENT
Start: 2021-12-08 | End: 2022-12-14

## 2021-12-16 ENCOUNTER — HOSPITAL ENCOUNTER (OUTPATIENT)
Dept: RADIOLOGY | Facility: HOSPITAL | Age: 76
Discharge: HOME | End: 2021-12-16
Attending: OBSTETRICS & GYNECOLOGY
Payer: MEDICARE

## 2021-12-16 DIAGNOSIS — M85.80 OSTEOPENIA DETERMINED BY X-RAY: ICD-10-CM

## 2021-12-16 DIAGNOSIS — N95.9 UNSPECIFIED MENOPAUSAL AND PERIMENOPAUSAL DISORDER: ICD-10-CM

## 2021-12-16 PROCEDURE — 77080 DEXA BONE DENSITY SPINE HIP: ICD-10-PCS | Mod: 26,,, | Performed by: RADIOLOGY

## 2021-12-16 PROCEDURE — 77080 DXA BONE DENSITY AXIAL: CPT | Mod: TC

## 2021-12-16 PROCEDURE — 77080 DXA BONE DENSITY AXIAL: CPT | Mod: 26,,, | Performed by: RADIOLOGY

## 2021-12-22 ENCOUNTER — TELEPHONE (OUTPATIENT)
Dept: OBSTETRICS AND GYNECOLOGY | Facility: CLINIC | Age: 76
End: 2021-12-22
Payer: MEDICARE

## 2021-12-23 ENCOUNTER — PROCEDURE VISIT (OUTPATIENT)
Dept: NEUROLOGY | Facility: CLINIC | Age: 76
End: 2021-12-23
Payer: MEDICARE

## 2021-12-23 DIAGNOSIS — G24.3 CERVICAL DYSTONIA: Primary | ICD-10-CM

## 2021-12-23 PROCEDURE — 64616 PR CHEMODENERVATION NECK MUSCLES EXC LARNYNX, UNI: ICD-10-PCS | Mod: 50,S$GLB,, | Performed by: PSYCHIATRY & NEUROLOGY

## 2021-12-23 PROCEDURE — 64616 CHEMODENERV MUSC NECK DYSTON: CPT | Mod: 50,S$GLB,, | Performed by: PSYCHIATRY & NEUROLOGY

## 2022-01-04 ENCOUNTER — TELEPHONE (OUTPATIENT)
Dept: OBSTETRICS AND GYNECOLOGY | Facility: CLINIC | Age: 77
End: 2022-01-04
Payer: MEDICARE

## 2022-01-04 NOTE — TELEPHONE ENCOUNTER
----- Message from Too Espinoza LPN sent at 2022  4:16 PM CST -----  Contact: PATIENT    ----- Message -----  From: Stephania Hyatt  Sent: 2022   3:20 PM CST  To: Jeremias Cunningham Staff    Evy Mendoza  MRN: [id number]  : 1945  PCP: Rakesh Devine  Home Phone      [phone]  Work Phone      Not on file.  Mobile          [phone]      MESSAGE: Patient states she was given her results for the Dexa scan yesterday and was told that she needed to do the infusion.  She would like more information regarding this before she does it.          Phone: [phone]

## 2022-01-04 NOTE — TELEPHONE ENCOUNTER
Pt called to get the spelling of Prolia so she can do her research, pt given the spelling and said she was going to research the medication.

## 2022-01-19 ENCOUNTER — TELEPHONE (OUTPATIENT)
Dept: OBSTETRICS AND GYNECOLOGY | Facility: CLINIC | Age: 77
End: 2022-01-19
Payer: MEDICARE

## 2022-01-19 NOTE — TELEPHONE ENCOUNTER
Pt requesting update on status of Prolia injections. I do not see anything in referrals where authorization is updated.

## 2022-01-19 NOTE — TELEPHONE ENCOUNTER
----- Message from Yodit Solis MA sent at 1/19/2022 11:04 AM CST -----  Contact: self  Evy Mendoza  MRN: [id number]  Home Phone      [phone]  Work Phone      Not on file.  Mobile          [phone]    Patient Care Team:  Rakesh Devine MD as PCP - General (Internal Medicine)  Marisa Mcdowell MD (Obstetrics and Gynecology)  OB? No  What phone number can you be reached at?  [phone]  Message:  Following up on Prolia injections Dr Del Rosario was wanting to start doing.

## 2022-01-27 ENCOUNTER — TELEPHONE (OUTPATIENT)
Dept: OBSTETRICS AND GYNECOLOGY | Facility: CLINIC | Age: 77
End: 2022-01-27
Payer: MEDICARE

## 2022-01-27 NOTE — TELEPHONE ENCOUNTER
Pt informed that the referrals for Prolia injections are sent to the infusion center that they will contact her as soon as they are able to. Pt informed that they are also the department that does the covid infusions which is more than likely why she hasn't heard from them yet. Pt verbally understood.

## 2022-01-27 NOTE — TELEPHONE ENCOUNTER
----- Message from Buster Thompson sent at 1/27/2022 12:02 PM CST -----  Contact: self  Evy Mendoza  MRN: [id number]  Home Phone      [phone]  Work Phone      Not on file.  Mobile          [phone]    Patient Care Team:  Rakesh Devine MD as PCP - General (Internal Medicine)  Marisa Mcdowell MD (Obstetrics and Gynecology)  OB? No  What phone number can you be reached at? [phone]  Message: Patient is stating she was told her bone density wasn't too great and she needed to get an injection. She is calling to find out about that injection, she is stating she hasnt heard anything back from anyone since then. Please return call.

## 2022-02-09 ENCOUNTER — INFUSION (OUTPATIENT)
Dept: INFUSION THERAPY | Facility: HOSPITAL | Age: 77
End: 2022-02-09
Attending: OBSTETRICS & GYNECOLOGY
Payer: MEDICARE

## 2022-02-09 VITALS
DIASTOLIC BLOOD PRESSURE: 80 MMHG | SYSTOLIC BLOOD PRESSURE: 147 MMHG | TEMPERATURE: 98 F | HEART RATE: 75 BPM | RESPIRATION RATE: 20 BRPM

## 2022-02-09 DIAGNOSIS — M81.0 AGE-RELATED OSTEOPOROSIS WITHOUT CURRENT PATHOLOGICAL FRACTURE: Primary | ICD-10-CM

## 2022-02-09 PROCEDURE — 63600175 PHARM REV CODE 636 W HCPCS: Performed by: OBSTETRICS & GYNECOLOGY

## 2022-02-09 PROCEDURE — 96372 THER/PROPH/DIAG INJ SC/IM: CPT

## 2022-02-09 RX ADMIN — DENOSUMAB 60 MG: 60 INJECTION SUBCUTANEOUS at 02:02

## 2022-02-09 NOTE — PATIENT INSTRUCTIONS
Patient Education       Denosumab (lu [place] adolfo mab)   Brand Names: [place] Prolia; Xgeva   Brand Names: Nguyen Prolia; Xgeva   What is this drug used for?   · It is used to treat soft, brittle bones (osteoporosis).  · It is used for bone growth.  · It is used when treating some cancers.  · It is used to treat high calcium levels in patients with cancer.  · It may be given to you for other reasons. Talk with the doctor.    What do I need to tell my doctor BEFORE I take this drug?   · If you are allergic to this drug; any part of this drug; or any other drugs, foods, or substances. Tell your doctor about the allergy and what signs you had.  · If you have low calcium levels.  · If you are using another drug that has the same drug in it.  · If you are pregnant or may be pregnant. Do not take this drug if you are pregnant.  · If you are breast-feeding or plan to breast-feed.  This is not a list of all drugs or health problems that interact with this drug.  Tell your doctor and pharmacist about all of your drugs (prescription or OTC, natural products, vitamins) and health problems. You must check to make sure that it is safe for you to take this drug with all of your drugs and health problems. Do not start, stop, or change the dose of any drug without checking with your doctor.  What are some things I need to know or do while I take this drug?   All products:   · Tell all of your health care providers that you take this drug. This includes your doctors, nurses, pharmacists, and dentists.  · This drug may raise the chance of a broken leg. Talk with the doctor.  · If treatment with this drug is stopped, skipped, or delayed, the chance of a broken bone is raised. This includes bones in the spine. The chance of having more than 1 broken bone in the spine is raised if you have ever had a broken bone in your spine. Do not stop, skip, or delay treatment with this drug without talking to your doctor.  · Have a bone density test as you  have been told by your doctor. Talk with your doctor.  · Have blood work checked as you have been told by the doctor. Talk with the doctor.  · Take calcium and vitamin D as you were told by your doctor.  · Have a dental exam before starting this drug.  · Take good care of your teeth. See a dentist often.  · This drug may cause harm to an unborn baby. A pregnancy test will be done before you start this drug to show that you are NOT pregnant.  · If you may become pregnant, you must use birth control while taking this drug and for some time after the last dose. Ask your doctor how long to use birth control. If you get pregnant, call your doctor right away.  Xgeva®:   · Very low blood calcium levels have happened with this drug. Sometimes, this has been deadly. If you have questions, talk with the doctor.  · High calcium levels have happened after this drug was stopped in people whose bones were still growing and people with giant cell bone tumor. Call your doctor right away if you have signs of high calcium levels like weakness, confusion, feeling tired, headache, upset stomach or throwing up, constipation, or bone pain.  Prolia:   · Very bad infections have been reported with use of this drug. If you have any infection, are taking antibiotics now or in the recent past, or have many infections, talk with your doctor.  · Rarely, a pancreas problem (pancreatitis) has happened with this drug. This has included 1 death. Call your doctor right away if you have signs of pancreatitis like very bad stomach pain, very bad back pain, or very bad upset stomach or throwing up.  · This drug may lower blood calcium levels. If you already have low blood calcium, it may get worse with this drug. Sometimes, blood calcium levels have stayed low for weeks or months after use of this drug. Talk with the doctor.  · This drug is not approved for use in children. Talk with the doctor.  What are some side effects that I need to call my  doctor about right away?   WARNING/CAUTION: Even though it may be rare, some people may have very bad and sometimes deadly side effects when taking a drug. Tell your doctor or get medical help right away if you have any of the following signs or symptoms that may be related to a very bad side effect:  All products:   · Signs of an allergic reaction, like rash; hives; itching; red, swollen, blistered, or peeling skin with or without fever; wheezing; tightness in the chest or throat; trouble breathing, swallowing, or talking; unusual hoarseness; or swelling of the mouth, face, lips, tongue, or throat.  · Signs of low calcium levels like muscle cramps or spasms, numbness and tingling, or seizures.  · Mouth sores.  · Swelling in the arms or legs.  · Feeling very tired or weak.  · Any new or strange groin, hip, or thigh pain.  · Very bad bone, joint, or muscle pain.  · Shortness of breath.  · This drug may cause jawbone problems. The risk may be higher with longer use, cancer, dental problems, ill-fitting dentures, anemia, blood clotting problems, or infection. It may also be higher if you have dental work, chemo, radiation, or take other drugs that may cause jawbone problems. Many drugs can do this. Talk with your doctor if any of these apply to you, or if you have questions. Call your doctor right away if you have jaw swelling or pain.  Xgeva®:   · Signs of low phosphate levels like change in eyesight, feeling confused, mood changes, muscle pain or weakness, shortness of breath or other breathing problems, or trouble swallowing.  · Very bad dizziness or passing out.  · Any unexplained bruising or bleeding.  Prolia:   · Signs of infection like fever, chills, very bad sore throat, ear or sinus pain, cough, more sputum or change in color of sputum, pain with passing urine, mouth sores, or wound that will not heal.  · Signs of skin infection like oozing, heat, swelling, redness, or pain.  · Signs of high or low blood  pressure like very bad headache or dizziness, passing out, or change in eyesight.  · Small bumps or patches on your skin, dry skin, or if your skin feels like leather.  · Bladder pain or pain when passing urine or change in how much urine is passed.  · Passing urine more often.  What are some other side effects of this drug?   All drugs may cause side effects. However, many people have no side effects or only have minor side effects. Call your doctor or get medical help if any of these side effects or any other side effects bother you or do not go away:  All products:   · Back pain.  · Headache.  · Signs of a common cold.  · Pain in arms or legs.  · Muscle or joint pain.  Xgeva®:   · Constipation, diarrhea, stomach pain, upset stomach, throwing up, or feeling less hungry.  · Feeling tired or weak.  · Nose or throat irritation.  · Tooth pain.  These are not all of the side effects that may occur. If you have questions about side effects, call your doctor. Call your doctor for medical advice about side effects.  You may report side effects to your national health agency.  You may report side effects to the FDA at 1-571.483.9891. You may also report side effects at https://www.fda.gov/medwatch.  How is this drug best taken?   Use this drug as ordered by your doctor. Read all information given to you. Follow all instructions closely.  · It is given as a shot into the fatty part of the skin.  What do I do if I miss a dose?   · Call your doctor to find out what to do.    How do I store and/or throw out this drug?   · If you need to store this drug at home, talk with your doctor, nurse, or pharmacist about how to store it.    General drug facts   · If your symptoms or health problems do not get better or if they become worse, call your doctor.  · Do not share your drugs with others and do not take anyone else's drugs.  · Keep all drugs in a safe place. Keep all drugs out of the reach of children and pets.  · Throw away  unused or  drugs. Do not flush down a toilet or pour down a drain unless you are told to do so. Check with your pharmacist if you have questions about the best way to throw out drugs. There may be drug take-back programs in your area.  · Some drugs may have another patient information leaflet. If you have any questions about this drug, please talk with your doctor, nurse, pharmacist, or other health care provider.  · Some drugs may have another patient information leaflet. Check with your pharmacist. If you have any questions about this drug, please talk with your doctor, nurse, pharmacist, or other health care provider.  · If you think there has been an overdose, call your poison control center or get medical care right away. Be ready to tell or show what was taken, how much, and when it happened.    Consumer Information Use and Disclaimer   This generalized information is a limited summary of diagnosis, treatment, and/or medication information. It is not meant to be comprehensive and should be used as a tool to help the user understand and/or assess potential diagnostic and treatment options. It does NOT include all information about conditions, treatments, medications, side effects, or risks that may apply to a specific patient. It is not intended to be medical advice or a substitute for the medical advice, diagnosis, or treatment of a health care provider based on the health care provider's examination and assessment of a patient's specific and unique circumstances. Patients must speak with a health care provider for complete information about their health, medical questions, and treatment options, including any risks or benefits regarding use of medications. This information does not endorse any treatments or medications as safe, effective, or approved for treating a specific patient. [employer] and its affiliates disclaim any warranty or liability relating to this information or the use thereof. The  use of this information is governed by the Terms of Use, available at https://www.[employer].com/en/solutions/lexicomp/about/israel.  Last Reviewed Date   2020-08-03  Copyright   © 2021 [employer] and its affiliates and/or licensors. All rights reserved.

## 2022-04-20 ENCOUNTER — HOSPITAL ENCOUNTER (OUTPATIENT)
Dept: SLEEP MEDICINE | Facility: HOSPITAL | Age: 77
Discharge: HOME | End: 2022-04-20
Attending: SPECIALIST
Payer: MEDICARE

## 2022-04-20 DIAGNOSIS — G47.33 OBSTRUCTIVE SLEEP APNEA (ADULT) (PEDIATRIC): ICD-10-CM

## 2022-04-20 PROCEDURE — 95806 SLEEP STUDY UNATT&RESP EFFT: CPT

## 2022-06-23 ENCOUNTER — PROCEDURE VISIT (OUTPATIENT)
Dept: NEUROLOGY | Facility: CLINIC | Age: 77
End: 2022-06-23
Payer: MEDICARE

## 2022-06-23 DIAGNOSIS — G24.3 CERVICAL DYSTONIA: Primary | ICD-10-CM

## 2022-06-23 PROCEDURE — 64616 PR CHEMODENERVATION NECK MUSCLES EXC LARNYNX, UNI: ICD-10-PCS | Mod: 50,S$GLB,, | Performed by: PSYCHIATRY & NEUROLOGY

## 2022-06-23 PROCEDURE — 64616 CHEMODENERV MUSC NECK DYSTON: CPT | Mod: 50,S$GLB,, | Performed by: PSYCHIATRY & NEUROLOGY

## 2022-06-23 NOTE — PROCEDURES
Procedures       BOTOX PROCEDURE NOTE     Date of Procedure:6/23/2022    Reason for Procedure: Cervical dystonia       Informed consent was obtained prior to performing this study. Two patient identifiers were confirmed with the patient prior to performing this study. A time out to determine correct patient and and agreement on procedure performed was conducted prior to the injections.     Procedure Details: After informed consent obtained the patient's head and upper neck was cleansed with alcohol rub and 300 Units of Botox (diluted 1:1) was injected in the following bilateral muscles:   50 units in each Trapezius*  40 units in each Levator Scapulae*  20 units in each Splenius Capitus*  20 units in each Longissimus*  10 units in each Spenius Cervices*  10 units in each Semispinalis Capitus*    *= denotes bilateral injections    The patient tolerated the procedure well with less than 0.25cc of blood loss. She was observed for several minutes post injection and given a handout from [employer] regarding when and where to seek help if side effects are experienced.    RTC in 6 months for more Botox, but after 12 weeks if needed at any point  Botox  continues to greatly help her symptoms of neck pain and dystonia and she is doing well with twice yearly injections  She sees Dr ADAM Liang for lumbar injection PRN-  she is using meds  PRN with PCP instead  and had updated MRI L spine as requested by Dr Dejesus which was largely unchanged and he did not recommend surgery in 2018  -still having lumbar radicular pain. Suggested she see pain management back as SHIRLENE failed prior (RFA candidate?)  Trigeminal neuralgia pain improved as documented prior and PCP follows this and labs  MRI C spine 1/2018 showed mild spinal stenosis per report

## 2022-07-12 PROBLEM — Z79.890 HORMONE REPLACEMENT THERAPY (HRT): Status: RESOLVED | Noted: 2019-12-02 | Resolved: 2022-07-12

## 2022-07-12 PROBLEM — Z83.3 FAMILY HISTORY OF DIABETES MELLITUS: Status: RESOLVED | Noted: 2019-12-02 | Resolved: 2022-07-12

## 2022-07-12 PROBLEM — Z98.890 PERSONAL HISTORY OF SPINE SURGERY: Status: RESOLVED | Noted: 2019-12-02 | Resolved: 2022-07-12

## 2022-07-12 PROBLEM — Z96.652 STATUS POST LEFT KNEE REPLACEMENT: Status: RESOLVED | Noted: 2019-12-06 | Resolved: 2022-07-12

## 2022-07-12 PROBLEM — N81.10 FEMALE BLADDER PROLAPSE: Status: RESOLVED | Noted: 2019-12-02 | Resolved: 2022-07-12

## 2022-07-12 PROBLEM — Z78.0 MENOPAUSE: Chronic | Status: RESOLVED | Noted: 2018-05-22 | Resolved: 2022-07-12

## 2022-07-12 PROBLEM — Z91.018 MULTIPLE FOOD ALLERGIES: Status: RESOLVED | Noted: 2019-12-02 | Resolved: 2022-07-12

## 2022-07-12 PROBLEM — B35.1 ONYCHOMYCOSIS: Status: RESOLVED | Noted: 2019-12-02 | Resolved: 2022-07-12

## 2022-07-12 PROBLEM — K21.9 ACID REFLUX: Status: RESOLVED | Noted: 2019-12-02 | Resolved: 2022-07-12

## 2022-07-12 PROBLEM — Z82.49 FAMILY HISTORY OF THROMBOSIS: Status: RESOLVED | Noted: 2019-12-02 | Resolved: 2022-07-12

## 2022-07-12 PROBLEM — M25.562 ACUTE PAIN OF LEFT KNEE: Status: RESOLVED | Noted: 2019-05-27 | Resolved: 2022-07-12

## 2022-07-12 PROBLEM — M17.10 KNEE ARTHROPATHY: Status: RESOLVED | Noted: 2018-05-30 | Resolved: 2022-07-12

## 2022-07-12 PROBLEM — R94.31 ABNORMAL EKG: Status: RESOLVED | Noted: 2019-12-08 | Resolved: 2022-07-12

## 2022-07-12 PROBLEM — Z96.659 S/P KNEE REPLACEMENT: Status: RESOLVED | Noted: 2019-12-09 | Resolved: 2022-07-12

## 2022-08-10 ENCOUNTER — INFUSION (OUTPATIENT)
Dept: INFUSION THERAPY | Facility: HOSPITAL | Age: 77
End: 2022-08-10
Attending: OBSTETRICS & GYNECOLOGY
Payer: MEDICARE

## 2022-08-10 VITALS
DIASTOLIC BLOOD PRESSURE: 76 MMHG | HEART RATE: 80 BPM | RESPIRATION RATE: 18 BRPM | TEMPERATURE: 97 F | SYSTOLIC BLOOD PRESSURE: 129 MMHG

## 2022-08-10 DIAGNOSIS — M81.0 AGE-RELATED OSTEOPOROSIS WITHOUT CURRENT PATHOLOGICAL FRACTURE: Primary | ICD-10-CM

## 2022-08-10 PROCEDURE — 63600175 PHARM REV CODE 636 W HCPCS: Performed by: OBSTETRICS & GYNECOLOGY

## 2022-08-10 PROCEDURE — 96372 THER/PROPH/DIAG INJ SC/IM: CPT

## 2022-08-10 RX ADMIN — DENOSUMAB 60 MG: 60 INJECTION SUBCUTANEOUS at 02:08

## 2022-08-10 NOTE — PROGRESS NOTES
Pt states had recent crown procedure.  Pt states started crown work about 5 weeks ago and it was completed 3 weeks ago.  Dr Del Rosario notified.  Ok to give prolia  Tolerated injection well  Dc to home in stable condition

## 2022-09-20 ENCOUNTER — TELEPHONE (OUTPATIENT)
Dept: OBSTETRICS AND GYNECOLOGY | Facility: CLINIC | Age: 77
End: 2022-09-20
Payer: MEDICARE

## 2022-09-20 DIAGNOSIS — Z12.31 BREAST CANCER SCREENING BY MAMMOGRAM: Primary | ICD-10-CM

## 2022-09-20 NOTE — TELEPHONE ENCOUNTER
----- Message from Yodit Solis MA sent at 9/20/2022  3:02 PM CDT -----  Contact: self  Evy Mendoza  MRN: [id number]  Home Phone      [phone]  Work Phone      Not on file.  Mobile          [phone]    Patient Care Team:  Rakesh Devine MD as PCP - General (Internal Medicine)  Marisa Mcdowell MD (Obstetrics and Gynecology)  [facility] (DME Provider)  OB? No  What phone number can you be reached at? [phone]  Message: Please link mammo orders to appt 12/16/22.

## 2022-12-16 ENCOUNTER — HOSPITAL ENCOUNTER (OUTPATIENT)
Dept: RADIOLOGY | Facility: HOSPITAL | Age: 77
Discharge: HOME | End: 2022-12-16
Attending: OBSTETRICS & GYNECOLOGY
Payer: MEDICARE

## 2022-12-16 VITALS — BODY MASS INDEX: 32.78 KG/M2 | WEIGHT: 192 LBS | HEIGHT: 64 IN

## 2022-12-16 DIAGNOSIS — Z12.31 BREAST CANCER SCREENING BY MAMMOGRAM: ICD-10-CM

## 2022-12-16 PROCEDURE — 77067 MAMMO DIGITAL SCREENING BILAT WITH TOMO: ICD-10-PCS | Mod: 26,,, | Performed by: RADIOLOGY

## 2022-12-16 PROCEDURE — 77063 BREAST TOMOSYNTHESIS BI: CPT | Mod: TC

## 2022-12-16 PROCEDURE — 77067 SCR MAMMO BI INCL CAD: CPT | Mod: 26,,, | Performed by: RADIOLOGY

## 2022-12-16 PROCEDURE — 77063 BREAST TOMOSYNTHESIS BI: CPT | Mod: 26,,, | Performed by: RADIOLOGY

## 2022-12-16 PROCEDURE — 77067 SCR MAMMO BI INCL CAD: CPT | Mod: TC

## 2022-12-16 PROCEDURE — 77063 MAMMO DIGITAL SCREENING BILAT WITH TOMO: ICD-10-PCS | Mod: 26,,, | Performed by: RADIOLOGY

## 2023-01-05 ENCOUNTER — PROCEDURE VISIT (OUTPATIENT)
Dept: NEUROLOGY | Facility: CLINIC | Age: 78
End: 2023-01-05
Payer: MEDICARE

## 2023-01-05 DIAGNOSIS — G24.3 CERVICAL DYSTONIA: Primary | ICD-10-CM

## 2023-01-05 PROCEDURE — 64616 CHEMODENERV MUSC NECK DYSTON: CPT | Mod: 50,S$GLB,, | Performed by: PSYCHIATRY & NEUROLOGY

## 2023-01-05 PROCEDURE — 64616 PR CHEMODENERVATION NECK MUSCLES EXC LARNYNX, UNI: ICD-10-PCS | Mod: 50,S$GLB,, | Performed by: PSYCHIATRY & NEUROLOGY

## 2023-01-05 NOTE — PROCEDURES
Procedures      BOTOX PROCEDURE NOTE     Date of Procedure:1/5/2023    Reason for Procedure: Cervical dystonia       Informed consent was obtained prior to performing this study. Two patient identifiers were confirmed with the patient prior to performing this study. A time out to determine correct patient and and agreement on procedure performed was conducted prior to the injections.     Procedure Details: After informed consent obtained the patient's head and upper neck was cleansed with alcohol rub and 300 Units of Botox (diluted 1:1) was injected in the following bilateral muscles:   50 units in each Trapezius*  40 units in each Levator Scapulae*  20 units in each Splenius Capitus*  20 units in each Longissimus*  10 units in each Spenius Cervices*  10 units in each Semispinalis Capitus*    *= denotes bilateral injections    The patient tolerated the procedure well with less than 0.25cc of blood loss. She was observed for several minutes post injection and given a handout from [employer] regarding when and where to seek help if side effects are experienced.    RTC in 6 months for more Botox, but after 12 weeks if needed at any point  Botox  continues to greatly help her symptoms of neck pain and dystonia and she is doing well with twice yearly injections  She sees Dr ADAM Liang for lumbar injection PRN-  she is used meds  PRN with PCP instead  and had updated MRI L spine as requested by Dr Dejesus which was largely unchanged and he did not recommend surgery in 2018  -sSuggested she see pain management back as needed noting she  SHIRLENE failed prior (RFA candidate?)/ currently being treated with PCP  Trigeminal neuralgia pain improved as documented prior and PCP follows this and labs  MRI C spine 1/2018 showed mild spinal stenosis per report

## 2023-01-24 ENCOUNTER — HOSPITAL ENCOUNTER (OUTPATIENT)
Dept: RADIOLOGY | Facility: HOSPITAL | Age: 78
Discharge: HOME | End: 2023-01-24
Attending: ANESTHESIOLOGY
Payer: MEDICARE

## 2023-01-24 DIAGNOSIS — M54.50 LOW BACK PAIN: ICD-10-CM

## 2023-01-24 PROCEDURE — 72148 MRI LUMBAR SPINE WITHOUT CONTRAST: ICD-10-PCS | Mod: 26,,, | Performed by: INTERNAL MEDICINE

## 2023-01-24 PROCEDURE — 72148 MRI LUMBAR SPINE W/O DYE: CPT | Mod: TC

## 2023-01-24 PROCEDURE — 72148 MRI LUMBAR SPINE W/O DYE: CPT | Mod: 26,,, | Performed by: INTERNAL MEDICINE

## 2023-01-26 DIAGNOSIS — M81.0 AGE-RELATED OSTEOPOROSIS WITHOUT CURRENT PATHOLOGICAL FRACTURE: Primary | ICD-10-CM

## 2023-02-10 ENCOUNTER — INFUSION (OUTPATIENT)
Dept: INFUSION THERAPY | Facility: HOSPITAL | Age: 78
End: 2023-02-10
Attending: OBSTETRICS & GYNECOLOGY
Payer: MEDICARE

## 2023-02-10 VITALS
HEART RATE: 83 BPM | TEMPERATURE: 97 F | SYSTOLIC BLOOD PRESSURE: 124 MMHG | RESPIRATION RATE: 18 BRPM | DIASTOLIC BLOOD PRESSURE: 64 MMHG

## 2023-02-10 DIAGNOSIS — M81.0 AGE-RELATED OSTEOPOROSIS WITHOUT CURRENT PATHOLOGICAL FRACTURE: Primary | ICD-10-CM

## 2023-02-10 PROCEDURE — 96372 THER/PROPH/DIAG INJ SC/IM: CPT

## 2023-02-10 PROCEDURE — 63600175 PHARM REV CODE 636 W HCPCS: Performed by: OBSTETRICS & GYNECOLOGY

## 2023-02-10 RX ADMIN — DENOSUMAB 60 MG: 60 INJECTION SUBCUTANEOUS at 02:02

## 2023-02-14 ENCOUNTER — OFFICE VISIT (OUTPATIENT)
Dept: OBSTETRICS AND GYNECOLOGY | Facility: CLINIC | Age: 78
End: 2023-02-14
Payer: MEDICARE

## 2023-02-14 VITALS
SYSTOLIC BLOOD PRESSURE: 124 MMHG | RESPIRATION RATE: 18 BRPM | DIASTOLIC BLOOD PRESSURE: 82 MMHG | OXYGEN SATURATION: 98 % | HEART RATE: 74 BPM | WEIGHT: 188.63 LBS | HEIGHT: 64 IN | BODY MASS INDEX: 32.2 KG/M2

## 2023-02-14 DIAGNOSIS — Z01.419 WELL WOMAN EXAM WITH ROUTINE GYNECOLOGICAL EXAM: Primary | ICD-10-CM

## 2023-02-14 DIAGNOSIS — Z12.39 ENCOUNTER FOR BREAST CANCER SCREENING USING NON-MAMMOGRAM MODALITY: ICD-10-CM

## 2023-02-14 DIAGNOSIS — Z46.89 PESSARY MAINTENANCE: ICD-10-CM

## 2023-02-14 DIAGNOSIS — M85.80 OSTEOPENIA DETERMINED BY X-RAY: ICD-10-CM

## 2023-02-14 DIAGNOSIS — Z79.890 HORMONE REPLACEMENT THERAPY (HRT): ICD-10-CM

## 2023-02-14 DIAGNOSIS — M81.0 AGE-RELATED OSTEOPOROSIS WITHOUT CURRENT PATHOLOGICAL FRACTURE: ICD-10-CM

## 2023-02-14 DIAGNOSIS — N39.3 SUI (STRESS URINARY INCONTINENCE, FEMALE): ICD-10-CM

## 2023-02-14 PROCEDURE — 1101F PR PT FALLS ASSESS DOC 0-1 FALLS W/OUT INJ PAST YR: ICD-10-PCS | Mod: CPTII,S$GLB,, | Performed by: OBSTETRICS & GYNECOLOGY

## 2023-02-14 PROCEDURE — 1126F PR PAIN SEVERITY QUANTIFIED, NO PAIN PRESENT: ICD-10-PCS | Mod: CPTII,S$GLB,, | Performed by: OBSTETRICS & GYNECOLOGY

## 2023-02-14 PROCEDURE — 1101F PT FALLS ASSESS-DOCD LE1/YR: CPT | Mod: CPTII,S$GLB,, | Performed by: OBSTETRICS & GYNECOLOGY

## 2023-02-14 PROCEDURE — 1126F AMNT PAIN NOTED NONE PRSNT: CPT | Mod: CPTII,S$GLB,, | Performed by: OBSTETRICS & GYNECOLOGY

## 2023-02-14 PROCEDURE — 3079F DIAST BP 80-89 MM HG: CPT | Mod: CPTII,S$GLB,, | Performed by: OBSTETRICS & GYNECOLOGY

## 2023-02-14 PROCEDURE — 1159F MED LIST DOCD IN RCRD: CPT | Mod: CPTII,S$GLB,, | Performed by: OBSTETRICS & GYNECOLOGY

## 2023-02-14 PROCEDURE — G0101 CA SCREEN;PELVIC/BREAST EXAM: HCPCS | Mod: GZ,S$GLB,, | Performed by: OBSTETRICS & GYNECOLOGY

## 2023-02-14 PROCEDURE — 99999 PR PBB SHADOW E&M-EST. PATIENT-LVL IV: CPT | Mod: PBBFAC,,, | Performed by: OBSTETRICS & GYNECOLOGY

## 2023-02-14 PROCEDURE — 3079F PR MOST RECENT DIASTOLIC BLOOD PRESSURE 80-89 MM HG: ICD-10-PCS | Mod: CPTII,S$GLB,, | Performed by: OBSTETRICS & GYNECOLOGY

## 2023-02-14 PROCEDURE — 3288F FALL RISK ASSESSMENT DOCD: CPT | Mod: CPTII,S$GLB,, | Performed by: OBSTETRICS & GYNECOLOGY

## 2023-02-14 PROCEDURE — G0101 PR CA SCREEN;PELVIC/BREAST EXAM: ICD-10-PCS | Mod: GZ,S$GLB,, | Performed by: OBSTETRICS & GYNECOLOGY

## 2023-02-14 PROCEDURE — 3074F PR MOST RECENT SYSTOLIC BLOOD PRESSURE < 130 MM HG: ICD-10-PCS | Mod: CPTII,S$GLB,, | Performed by: OBSTETRICS & GYNECOLOGY

## 2023-02-14 PROCEDURE — 3074F SYST BP LT 130 MM HG: CPT | Mod: CPTII,S$GLB,, | Performed by: OBSTETRICS & GYNECOLOGY

## 2023-02-14 PROCEDURE — 1159F PR MEDICATION LIST DOCUMENTED IN MEDICAL RECORD: ICD-10-PCS | Mod: CPTII,S$GLB,, | Performed by: OBSTETRICS & GYNECOLOGY

## 2023-02-14 PROCEDURE — 99999 PR PBB SHADOW E&M-EST. PATIENT-LVL IV: ICD-10-PCS | Mod: PBBFAC,,, | Performed by: OBSTETRICS & GYNECOLOGY

## 2023-02-14 PROCEDURE — 3288F PR FALLS RISK ASSESSMENT DOCUMENTED: ICD-10-PCS | Mod: CPTII,S$GLB,, | Performed by: OBSTETRICS & GYNECOLOGY

## 2023-02-14 RX ORDER — CONJUGATED ESTROGENS AND MEDROXYPROGESTERONE ACETATE .3; 1.5 MG/1; MG/1
1 TABLET, SUGAR COATED ORAL DAILY
Qty: 84 TABLET | Refills: 3 | Status: SHIPPED | OUTPATIENT
Start: 2023-02-14 | End: 2024-02-01

## 2023-02-14 NOTE — PROGRESS NOTES
Subjective:    Patient ID: Evy Mendoza is a 77 y.o. y.o. female.     Chief Complaint: Annual Well Woman Exam     History of Present Illness:  Evy presents today for Annual Well Woman exam. She describes her menses as  absent .She denies pelvic pain.  She denies breast tenderness, masses, nipple discharge. She denies difficulty with urination or bowel movements. She denies menopausal symptoms such as hotflashes, vaginal dryness, and night sweats. She denies bloating, early satiety, or weight changes. She is sexually active. Contraception is by no method.    Patient has a #4 ring with no support in place; she reports having increased JENNIFER; discussed changing pessary to ring with knob. Desires refill of HRT.    The following portions of the patient's history were reviewed and updated as appropriate: allergies, current medications, past family history, past medical history, past social history, past surgical history and problem list.      Menstrual History:   No LMP recorded. Patient is postmenopausal..     OB History          2    Para   2    Term   2            AB        Living   2         SAB        IAB        Ectopic        Multiple        Live Births   2                 The following portions of the patient's history were reviewed and updated as appropriate: allergies, current medications, past family history, past medical history, past social history, past surgical history and problem list.        ROS:     Review of Systems   Constitutional: Negative for activity change, appetite change, chills, diaphoresis, fatigue, fever and unexpected weight change.   HENT: Negative for mouth sores and tinnitus.    Eyes: Negative for discharge and visual disturbance.   Respiratory: Negative for cough, shortness of breath and wheezing.    Cardiovascular: Negative for chest pain, palpitations and leg swelling.   Gastrointestinal: Negative for abdominal pain, bloating, blood in stool, constipation,  "diarrhea, nausea and vomiting.   Endocrine: Negative for diabetes, hair loss, hot flashes, hyperthyroidism and hypothyroidism.   Genitourinary: Negative for dysmenorrhea, dyspareunia, dysuria, flank pain, frequency, genital sores, hematuria, menorrhagia, menstrual problem, pelvic pain, urgency, vaginal bleeding, vaginal discharge, vaginal pain, postcoital bleeding and vaginal odor.   Musculoskeletal: Negative for back pain, joint swelling and myalgias.   Integumentary:  Negative for rash, acne, breast mass, nipple discharge and breast skin changes.   Neurological: Negative for seizures, syncope, numbness and headaches.   Hematological: Negative for adenopathy. Does not bruise/bleed easily.   Psychiatric/Behavioral: Negative for depression and sleep disturbance. The patient is not nervous/anxious.    Breast: Negative for mass, mastodynia, nipple discharge and skin changes      Objective:    Vital Signs:  Vitals:    02/14/23 1213   BP: 124/82   Pulse: 74   Resp: 18   SpO2: 98%   Weight: 85.5 kg (188 lb 9.6 oz)   Height: 5' 4" (1.626 m)           Physical Exam:  General:  alert, cooperative, appears stated age   Skin:  Skin color, texture, turgor normal. No rashes or lesions   HEENT:  conjunctivae/corneas clear. PERRL.   Neck: supple, trachea midline, no adenopathy or thyromegally   Respiratory:  clear to auscultation bilaterally   Heart:  regular rate and rhythm, S1, S2 normal, no murmur, click, rub or gallop   Breasts:  no discharge, erythema, or tenderness   Abdomen:  normal findings: bowel sounds normal, no masses palpable, no organomegaly and soft, non-tender   Pelvis: External genitalia: normal general appearance  Urinary system: urethral meatus normal, bladder nontender  Vaginal: normal mucosa without prolapse or lesions, atrophic mucosa; pessary removed; #4 ring with knob; no support placed without difficulty  Cervix: normal appearance  Uterus: normal size, shape, position  Adnexa: normal size, nontender " bilaterally   Extremities: Normal ROM; no edema, no cyanosis   Neurologial: Normal strength and tone. No focal numbness or weakness. Reflexes 2+ and equal.   Psychiatric: normal mood, speech, dress, and thought processes         Assessment:       Healthy female exam.     1. Well woman exam with routine gynecological exam    2. Encounter for breast cancer screening using non-mammogram modality    3. Age-related osteoporosis without current pathological fracture    4. Hormone replacement therapy (HRT)    5. Osteopenia determined by x-ray    6. Pessary maintenance    7. JENNIFER (stress urinary incontinence, female)          Plan:       pap smear deferred per guidelines    Pessary replaced; patient does well with removal and reinsertion at home  Refill HRT  MMG UTD  Discussed weight bearing exercise, calcium, and vitamin d for osteoporosis prevention  Colonoscopy up to date per patient  DEXA scan UTD; patient previously on fosamax however off for 2 years (previously on it for ~5 years); now on prolia   RTC in 1 year    COUNSELING:  Evy was counseled on STD pevention, use and side-effects of various contraceptive measures, A.C.O.G. Pap guidelines and recommendations for yearly pelvic exams in addition to recommendations for monthly self breast exams; to see her PCP for other health maintenance.

## 2023-07-06 ENCOUNTER — PROCEDURE VISIT (OUTPATIENT)
Dept: NEUROLOGY | Facility: CLINIC | Age: 78
End: 2023-07-06
Payer: MEDICARE

## 2023-07-06 DIAGNOSIS — G24.3 CERVICAL DYSTONIA: Primary | ICD-10-CM

## 2023-07-06 PROCEDURE — 64616 CHEMODENERV MUSC NECK DYSTON: CPT | Mod: 50,S$GLB,, | Performed by: PSYCHIATRY & NEUROLOGY

## 2023-07-06 PROCEDURE — 64616 PR CHEMODENERVATION NECK MUSCLES EXC LARNYNX, UNI: ICD-10-PCS | Mod: 50,S$GLB,, | Performed by: PSYCHIATRY & NEUROLOGY

## 2023-07-06 NOTE — PROCEDURES
Procedures    BOTOX PROCEDURE NOTE     Date of Procedure:7/6/2023    Reason for Procedure: Cervical dystonia       Informed consent was obtained prior to performing this study. Two patient identifiers were confirmed with the patient prior to performing this study. A time out to determine correct patient and and agreement on procedure performed was conducted prior to the injections.     Procedure Details: After informed consent obtained the patient's head and upper neck was cleansed with alcohol rub and 300 Units of Botox (diluted 1:1) was injected in the following bilateral muscles:   50 units in each Trapezius*  40 units in each Levator Scapulae*  20 units in each Splenius Capitus*  20 units in each Longissimus*  10 units in each Spenius Cervices*  10 units in each Semispinalis Capitus*    *= denotes bilateral injections    The patient tolerated the procedure well with less than 0.25cc of blood loss. She was observed for several minutes post injection and given a handout from [employer] regarding when and where to seek help if side effects are experienced.    RTC in 6 months for more Botox, but after 12 weeks if needed at any point  Botox  continues to greatly help her symptoms of neck pain and dystonia and she is doing well with twice yearly injections  She sees Dr ADAM Liang for lumbar injection PRN-  she is used meds  PRN with PCP instead  and had updated MRI L spine as requested by Dr Dejesus which was largely unchanged and he did not recommend surgery in 2018  -Suggested she see pain management back as needed noting she  SHIRLENE failed prior/ also  being treated with PCP  Trigeminal neuralgia pain improved as documented prior and PCP follows this and labs  MRI C spine 1/2018 showed mild spinal stenosis per report  Cautioned her re: Reglan Rx per PCP and the potential for worsening dystonia and other movement side effects. She has discontinued use

## 2023-07-18 ENCOUNTER — TELEPHONE (OUTPATIENT)
Dept: OBSTETRICS AND GYNECOLOGY | Facility: CLINIC | Age: 78
End: 2023-07-18
Payer: MEDICARE

## 2023-07-18 NOTE — TELEPHONE ENCOUNTER
----- Message from Yodit Solis MA sent at 7/18/2023  9:02 AM CDT -----  Contact: self  Evy Mendoza  MRN: [id number]  Home Phone      [phone]  Work Phone      Not on file.  Mobile          [phone]    Patient Care Team:  Rakesh Devine MD as PCP - General (Internal Medicine)  Marisa Mcdowell MD (Obstetrics and Gynecology)  [facility] (DME Provider)  Virgilio Alonzo MD as Consulting Physician (Pain Medicine)  Caleb Lowe MD as Consulting Physician (Gastroenterology)  Madhavi Jordan MD as Consulting Physician (Hematology and Oncology)  OB? No  What phone number can you be reached at? [phone]  Message: Has questions regarding prolia injections.

## 2023-07-18 NOTE — TELEPHONE ENCOUNTER
Pt called and stated she has received 3 prolia injections so far and is scheduled for her next one in August. Pt complained of gum and teeth pain for 2 1/2 weeks after her last injection. Pt desires to know if should continue with prolia injections. Pt desires to know what her alternatives are also. Pt aware Dr. Del Rosario is out this week and that the provider on call will be sent this message to review. Please advise. Thank you.

## 2023-08-08 NOTE — TELEPHONE ENCOUNTER
----- Message from Tavia Mcmanus sent at 8/8/2023  2:01 PM CDT -----  Contact: self  Evy Mendoza  MRN: [id number]  Home Phone      [phone]  Work Phone      Not on file.  Mobile          [phone]    Patient Care Team:  Rakesh Devine MD as PCP - General (Internal Medicine)  [facility]  [facility] - (DME Provider)  Virgilio Alonzo MD as Consulting Physician (Pain Medicine)  Caleb Lowe MD as Consulting Physician (Gastroenterology)  Madhavi Jordan MD as Consulting Physician (Hematology and Oncology)  OB? No  What phone number can you be reached at? [phone]  Message: patient is stating blood in urine possible UTI

## 2023-08-09 ENCOUNTER — CLINICAL SUPPORT (OUTPATIENT)
Dept: OBSTETRICS AND GYNECOLOGY | Facility: CLINIC | Age: 78
End: 2023-08-09
Payer: MEDICARE

## 2023-08-09 ENCOUNTER — TELEPHONE (OUTPATIENT)
Dept: OBSTETRICS AND GYNECOLOGY | Facility: CLINIC | Age: 78
End: 2023-08-09

## 2023-08-09 DIAGNOSIS — R39.9 UTI SYMPTOMS: Primary | ICD-10-CM

## 2023-08-09 LAB
BILIRUB SERPL-MCNC: NEGATIVE MG/DL
BLOOD URINE, POC: NEGATIVE
CLARITY, POC UA: CLEAR
COLOR, POC UA: ABNORMAL
GLUCOSE UR QL STRIP: NORMAL
KETONES UR QL STRIP: NEGATIVE
LEUKOCYTE ESTERASE URINE, POC: ABNORMAL
NITRITE, POC UA: POSITIVE
PH, POC UA: 5
PROTEIN, POC: NEGATIVE
SPECIFIC GRAVITY, POC UA: 1.01
UROBILINOGEN, POC UA: NORMAL

## 2023-08-09 PROCEDURE — 81002 POCT URINE DIPSTICK WITHOUT MICROSCOPE: ICD-10-PCS | Mod: S$GLB,,, | Performed by: OBSTETRICS & GYNECOLOGY

## 2023-08-09 PROCEDURE — 87086 URINE CULTURE/COLONY COUNT: CPT | Performed by: OBSTETRICS & GYNECOLOGY

## 2023-08-09 PROCEDURE — 81002 URINALYSIS NONAUTO W/O SCOPE: CPT | Mod: S$GLB,,, | Performed by: OBSTETRICS & GYNECOLOGY

## 2023-08-09 RX ORDER — SULFAMETHOXAZOLE AND TRIMETHOPRIM 800; 160 MG/1; MG/1
1 TABLET ORAL 2 TIMES DAILY
Qty: 6 TABLET | Refills: 0 | Status: SHIPPED | OUTPATIENT
Start: 2023-08-09 | End: 2023-08-12

## 2023-08-09 NOTE — PROGRESS NOTES
Patient seen for nurse visit for UTI symptoms. POCT urinalysis completed and specimen sent for C&S.  See telephone note.

## 2023-08-09 NOTE — TELEPHONE ENCOUNTER
----- Message from Yodit Solis MA sent at 8/9/2023  3:00 PM CDT -----  Contact: self  Evy Mendoza  MRN: [id number]  Home Phone      [phone]  Work Phone      Not on file.  Mobile          [phone]    Patient Care Team:  Rakesh Devine MD as PCP - General (Internal Medicine)  [facility]  [facility] - (DME Provider)  Virgilio Alonzo MD as Consulting Physician (Pain Medicine)  Caleb Lowe MD as Consulting Physician (Gastroenterology)  Madhavi Jordan MD as Consulting Physician (Hematology and Oncology)  OB? No  What phone number can you be reached at? [phone]  Message: Would like ua results.

## 2023-08-09 NOTE — TELEPHONE ENCOUNTER
Patient came in for nurse visit. POCT urinalysis results:    Component 08:39   Color, UA Orange    pH, UA 5    WBC, UA trace Abnormal     Nitrite, UA positive Abnormal     Protein, POC negative    Glucose, UA normal    Ketones, UA negative    Urobilinogen, UA normal    Bilirubin, POC negative    Blood, UA negative    Clarity, UA Clear    Spec Grav UA 1.015      Specimen sent to lab for C&S.

## 2023-08-10 ENCOUNTER — OFFICE VISIT (OUTPATIENT)
Dept: OBSTETRICS AND GYNECOLOGY | Facility: CLINIC | Age: 78
End: 2023-08-10
Payer: MEDICARE

## 2023-08-10 VITALS
HEART RATE: 74 BPM | RESPIRATION RATE: 18 BRPM | BODY MASS INDEX: 32.6 KG/M2 | WEIGHT: 190.94 LBS | HEIGHT: 64 IN | OXYGEN SATURATION: 98 % | DIASTOLIC BLOOD PRESSURE: 80 MMHG | SYSTOLIC BLOOD PRESSURE: 124 MMHG

## 2023-08-10 DIAGNOSIS — Z12.39 ENCOUNTER FOR BREAST CANCER SCREENING USING NON-MAMMOGRAM MODALITY: ICD-10-CM

## 2023-08-10 DIAGNOSIS — Z12.31 ENCOUNTER FOR SCREENING MAMMOGRAM FOR MALIGNANT NEOPLASM OF BREAST: ICD-10-CM

## 2023-08-10 DIAGNOSIS — M81.0 AGE-RELATED OSTEOPOROSIS WITHOUT CURRENT PATHOLOGICAL FRACTURE: Primary | ICD-10-CM

## 2023-08-10 LAB
BACTERIA UR CULT: NORMAL
BACTERIA UR CULT: NORMAL

## 2023-08-10 PROCEDURE — 1101F PT FALLS ASSESS-DOCD LE1/YR: CPT | Mod: CPTII,S$GLB,, | Performed by: OBSTETRICS & GYNECOLOGY

## 2023-08-10 PROCEDURE — 99999 PR PBB SHADOW E&M-EST. PATIENT-LVL V: ICD-10-PCS | Mod: PBBFAC,,, | Performed by: OBSTETRICS & GYNECOLOGY

## 2023-08-10 PROCEDURE — 99999 PR PBB SHADOW E&M-EST. PATIENT-LVL V: CPT | Mod: PBBFAC,,, | Performed by: OBSTETRICS & GYNECOLOGY

## 2023-08-10 PROCEDURE — 1159F PR MEDICATION LIST DOCUMENTED IN MEDICAL RECORD: ICD-10-PCS | Mod: CPTII,S$GLB,, | Performed by: OBSTETRICS & GYNECOLOGY

## 2023-08-10 PROCEDURE — 3288F FALL RISK ASSESSMENT DOCD: CPT | Mod: CPTII,S$GLB,, | Performed by: OBSTETRICS & GYNECOLOGY

## 2023-08-10 PROCEDURE — 1126F AMNT PAIN NOTED NONE PRSNT: CPT | Mod: CPTII,S$GLB,, | Performed by: OBSTETRICS & GYNECOLOGY

## 2023-08-10 PROCEDURE — 3074F PR MOST RECENT SYSTOLIC BLOOD PRESSURE < 130 MM HG: ICD-10-PCS | Mod: CPTII,S$GLB,, | Performed by: OBSTETRICS & GYNECOLOGY

## 2023-08-10 PROCEDURE — 3074F SYST BP LT 130 MM HG: CPT | Mod: CPTII,S$GLB,, | Performed by: OBSTETRICS & GYNECOLOGY

## 2023-08-10 PROCEDURE — 3288F PR FALLS RISK ASSESSMENT DOCUMENTED: ICD-10-PCS | Mod: CPTII,S$GLB,, | Performed by: OBSTETRICS & GYNECOLOGY

## 2023-08-10 PROCEDURE — 3079F PR MOST RECENT DIASTOLIC BLOOD PRESSURE 80-89 MM HG: ICD-10-PCS | Mod: CPTII,S$GLB,, | Performed by: OBSTETRICS & GYNECOLOGY

## 2023-08-10 PROCEDURE — 3079F DIAST BP 80-89 MM HG: CPT | Mod: CPTII,S$GLB,, | Performed by: OBSTETRICS & GYNECOLOGY

## 2023-08-10 PROCEDURE — 99213 PR OFFICE/OUTPT VISIT, EST, LEVL III, 20-29 MIN: ICD-10-PCS | Mod: S$GLB,,, | Performed by: OBSTETRICS & GYNECOLOGY

## 2023-08-10 PROCEDURE — 1126F PR PAIN SEVERITY QUANTIFIED, NO PAIN PRESENT: ICD-10-PCS | Mod: CPTII,S$GLB,, | Performed by: OBSTETRICS & GYNECOLOGY

## 2023-08-10 PROCEDURE — 1101F PR PT FALLS ASSESS DOC 0-1 FALLS W/OUT INJ PAST YR: ICD-10-PCS | Mod: CPTII,S$GLB,, | Performed by: OBSTETRICS & GYNECOLOGY

## 2023-08-10 PROCEDURE — 99213 OFFICE O/P EST LOW 20 MIN: CPT | Mod: S$GLB,,, | Performed by: OBSTETRICS & GYNECOLOGY

## 2023-08-10 PROCEDURE — 1159F MED LIST DOCD IN RCRD: CPT | Mod: CPTII,S$GLB,, | Performed by: OBSTETRICS & GYNECOLOGY

## 2023-08-10 NOTE — PROGRESS NOTES
Subjective:    Patient ID: Evy Mendoza is a 78 y.o. y.o. female.     Chief Complaint:   Chief Complaint   Patient presents with    Consult       History of Present Illness:  Evy presents today to discuss some symptoms she is having from what she believes is from the Prolia. She has received three doses now and is due for another one tomorrow. She reports after the last dose she had jaw and gum discomfort for ~ 2 weeks.      ROS:   Review of Systems   Constitutional:  Negative for activity change, appetite change, chills, diaphoresis, fatigue, fever and unexpected weight change.   HENT:  Negative for mouth sores and tinnitus.    Eyes:  Negative for discharge and visual disturbance.   Respiratory:  Positive for wheezing. Negative for cough and shortness of breath.    Cardiovascular:  Negative for chest pain, palpitations and leg swelling.   Gastrointestinal:  Positive for abdominal pain and constipation. Negative for bloating, blood in stool, diarrhea, nausea and vomiting.   Endocrine: Negative for diabetes, hair loss, hot flashes, hyperthyroidism and hypothyroidism.   Genitourinary:  Positive for pelvic pain and vaginal discharge. Negative for dysuria, flank pain, frequency, genital sores, hematuria, urgency, vaginal bleeding, vaginal pain, postcoital bleeding and vaginal odor.   Musculoskeletal:  Positive for arthralgias, back pain, joint swelling and myalgias.   Integumentary:  Negative for rash, acne, breast mass, nipple discharge and breast skin changes.   Neurological:  Negative for seizures, syncope, numbness and headaches.   Hematological:  Negative for adenopathy. Does not bruise/bleed easily.   Psychiatric/Behavioral:  Negative for depression and sleep disturbance. The patient is nervous/anxious.    Breast: Negative for mass, mastodynia, nipple discharge and skin changes          Objective:    Vital Signs:  Vitals:    08/10/23 0819   BP: 124/80   Pulse: 74   Resp: 18       Physical  Exam:  General:  alert, cooperative, no distress   Head Normocephalic, without obvious abnormality, atraumatic   Neck .supple, symmetrical, trachea midline   Skin:  Skin color, texture, turgor normal. No rashes or lesions   Abdomen:  soft, non-tender; bowel sounds normal   Pelvis: Deferred   Extremities extremities normal, atraumatic, no cyanosis or edema   Neurologic Grossly normal        Assessment:      1. Age-related osteoporosis without current pathological fracture    2. Encounter for breast cancer screening using non-mammogram modality    3. Encounter for screening mammogram for malignant neoplasm of breast          Plan:      PLAN:   1. Will repeat DEXA in Dec  2. MMG ordered in Dec

## 2023-08-11 ENCOUNTER — INFUSION (OUTPATIENT)
Dept: INFUSION THERAPY | Facility: HOSPITAL | Age: 78
End: 2023-08-11
Attending: OBSTETRICS & GYNECOLOGY
Payer: MEDICARE

## 2023-08-11 VITALS
RESPIRATION RATE: 18 BRPM | HEART RATE: 79 BPM | DIASTOLIC BLOOD PRESSURE: 68 MMHG | TEMPERATURE: 98 F | SYSTOLIC BLOOD PRESSURE: 116 MMHG

## 2023-08-11 DIAGNOSIS — M81.0 AGE-RELATED OSTEOPOROSIS WITHOUT CURRENT PATHOLOGICAL FRACTURE: Primary | ICD-10-CM

## 2023-08-11 PROCEDURE — 63600175 PHARM REV CODE 636 W HCPCS: Mod: JZ,TB | Performed by: OBSTETRICS & GYNECOLOGY

## 2023-08-11 PROCEDURE — 96372 THER/PROPH/DIAG INJ SC/IM: CPT

## 2023-08-11 RX ADMIN — DENOSUMAB 60 MG: 60 INJECTION SUBCUTANEOUS at 02:08

## 2023-10-31 PROBLEM — R13.19 ESOPHAGEAL DYSPHAGIA: Status: ACTIVE | Noted: 2023-10-31

## 2023-11-02 DIAGNOSIS — R13.19 ESOPHAGEAL DYSPHAGIA: Primary | ICD-10-CM

## 2023-12-04 ENCOUNTER — TELEPHONE (OUTPATIENT)
Dept: OBSTETRICS AND GYNECOLOGY | Facility: CLINIC | Age: 78
End: 2023-12-04
Payer: MEDICARE

## 2023-12-04 NOTE — TELEPHONE ENCOUNTER
----- Message from Marielena Dg sent at 12/4/2023  1:35 PM CST -----  Contact: Self  Evy Mendoza  MRN: [id number]  Home Phone      [phone]  Work Phone      Not on file.  Mobile          [phone]    Patient Care Team:  Rakesh Devine MD as PCP - General (Internal Medicine)  [facility] R[facility] MD (Obstetrics and Gynecology)  [facility] - (DME Provider)  Caleb Lowe MD as Consulting Physician (Gastroenterology)  Madhavi Jordan MD as Consulting Physician (Hematology and Oncology)  OB? No  What phone number can you be reached at? [phone]  Message: pt states she is having a bloody discharge and states she is having some pressure in her pelvic area and lower back

## 2023-12-07 ENCOUNTER — OFFICE VISIT (OUTPATIENT)
Dept: OBSTETRICS AND GYNECOLOGY | Facility: CLINIC | Age: 78
End: 2023-12-07
Payer: MEDICARE

## 2023-12-07 VITALS
HEIGHT: 64 IN | HEART RATE: 82 BPM | DIASTOLIC BLOOD PRESSURE: 74 MMHG | SYSTOLIC BLOOD PRESSURE: 118 MMHG | BODY MASS INDEX: 32.28 KG/M2 | WEIGHT: 189.06 LBS

## 2023-12-07 DIAGNOSIS — Z46.89 PESSARY MAINTENANCE: ICD-10-CM

## 2023-12-07 DIAGNOSIS — R39.9 UTI SYMPTOMS: Primary | ICD-10-CM

## 2023-12-07 DIAGNOSIS — N93.9 VAGINAL BLEEDING: ICD-10-CM

## 2023-12-07 PROCEDURE — 1125F PR PAIN SEVERITY QUANTIFIED, PAIN PRESENT: ICD-10-PCS | Mod: CPTII,S$GLB,, | Performed by: OBSTETRICS & GYNECOLOGY

## 2023-12-07 PROCEDURE — 99999 PR PBB SHADOW E&M-EST. PATIENT-LVL IV: ICD-10-PCS | Mod: PBBFAC,,, | Performed by: OBSTETRICS & GYNECOLOGY

## 2023-12-07 PROCEDURE — 87086 URINE CULTURE/COLONY COUNT: CPT | Performed by: OBSTETRICS & GYNECOLOGY

## 2023-12-07 PROCEDURE — 3074F PR MOST RECENT SYSTOLIC BLOOD PRESSURE < 130 MM HG: ICD-10-PCS | Mod: CPTII,S$GLB,, | Performed by: OBSTETRICS & GYNECOLOGY

## 2023-12-07 PROCEDURE — 3078F PR MOST RECENT DIASTOLIC BLOOD PRESSURE < 80 MM HG: ICD-10-PCS | Mod: CPTII,S$GLB,, | Performed by: OBSTETRICS & GYNECOLOGY

## 2023-12-07 PROCEDURE — 1159F MED LIST DOCD IN RCRD: CPT | Mod: CPTII,S$GLB,, | Performed by: OBSTETRICS & GYNECOLOGY

## 2023-12-07 PROCEDURE — 3074F SYST BP LT 130 MM HG: CPT | Mod: CPTII,S$GLB,, | Performed by: OBSTETRICS & GYNECOLOGY

## 2023-12-07 PROCEDURE — 99999 PR PBB SHADOW E&M-EST. PATIENT-LVL IV: CPT | Mod: PBBFAC,,, | Performed by: OBSTETRICS & GYNECOLOGY

## 2023-12-07 PROCEDURE — 1159F PR MEDICATION LIST DOCUMENTED IN MEDICAL RECORD: ICD-10-PCS | Mod: CPTII,S$GLB,, | Performed by: OBSTETRICS & GYNECOLOGY

## 2023-12-07 PROCEDURE — 1125F AMNT PAIN NOTED PAIN PRSNT: CPT | Mod: CPTII,S$GLB,, | Performed by: OBSTETRICS & GYNECOLOGY

## 2023-12-07 PROCEDURE — 3078F DIAST BP <80 MM HG: CPT | Mod: CPTII,S$GLB,, | Performed by: OBSTETRICS & GYNECOLOGY

## 2023-12-07 PROCEDURE — 99213 OFFICE O/P EST LOW 20 MIN: CPT | Mod: S$GLB,,, | Performed by: OBSTETRICS & GYNECOLOGY

## 2023-12-07 PROCEDURE — 99213 PR OFFICE/OUTPT VISIT, EST, LEVL III, 20-29 MIN: ICD-10-PCS | Mod: S$GLB,,, | Performed by: OBSTETRICS & GYNECOLOGY

## 2023-12-07 RX ORDER — DENOSUMAB 60 MG/ML
INJECTION SUBCUTANEOUS
COMMUNITY
Start: 2023-11-27

## 2023-12-07 NOTE — PROGRESS NOTES
Subjective:    Patient ID: Evy Mendoza is a 78 y.o. y.o. female.     Chief Complaint:   Chief Complaint   Patient presents with    Vaginal Bleeding    Pelvic Pain       History of Present Illness:   Evy presents complaining of an episode of bright red vaginal bleeding. She has a pessary in place. She reports it was with wiping and is no longer bleeding. She is having some pelvic pressure.       MEDICATION LIST    Current Outpatient Medications:     acetaminophen (TYLENOL) 325 MG tablet, Take 325 mg by mouth every 6 (six) hours as needed for Pain., Disp: , Rfl:     albuterol (PROVENTIL) 2.5 mg /3 mL (0.083 %) nebulizer solution, 3 mLs., Disp: , Rfl:     ascorbic acid (VITAMIN C ORAL), Take by mouth., Disp: , Rfl:     calcium-vitamin D3 (OS-INDIA 500 + D3) 500 mg(1,250mg) -200 unit per tablet, Take 1 tablet by mouth once daily at 6am. , Disp: , Rfl:     carBAMazepine (TEGRETOL) 200 mg tablet, Take 1 tablet twice a day by oral route., Disp: , Rfl:     cetirizine (ZYRTEC) 10 MG tablet, Take 5 mg by mouth as needed for Allergies. , Disp: , Rfl:     cinnamon bark (CINNAMON ORAL), Take by mouth., Disp: , Rfl:     cyanocobalamin 500 MCG tablet, Take 500 mcg by mouth once daily., Disp: , Rfl:     denosumab (PROLIA) 60 mg/mL Syrg, Inject 60 mg into the skin every 6 (six) months., Disp: , Rfl:     denosumab (PROLIA) 60 mg/mL Syrg, Prolia 60 mg/mL subcutaneous syringe, [RxNorm: 530517], Disp: , Rfl:     docusate sodium (COLACE) 100 MG capsule, Take 1 capsule every day by oral route., Disp: , Rfl:     ezetimibe (ZETIA) 10 mg tablet, TAKE 1 TABLET (10 MG TOTAL) BY MOUTH ONCE DAILY., Disp: 90 tablet, Rfl: 3    famotidine (PEPCID) 40 MG tablet, Take 40 mg by mouth., Disp: , Rfl:     fish oil-omega-3 fatty acids 300-1,000 mg capsule, Take 1 g by mouth once daily. , Disp: , Rfl:     fluticasone propionate (FLONASE) 50 mcg/actuation nasal spray, 1 spray by Nasal route as needed for Allergies. , Disp: , Rfl:     justin sul  2KCl/msm/chond/C/Mn (GLUCOSAMINE CHONDROITIN ORAL), Take by mouth., Disp: , Rfl:     lactulose (CHRONULAC) 10 gram/15 mL solution, Take 1 tablet by mouth once a week., Disp: , Rfl:     losartan-hydrochlorothiazide 50-12.5 mg (HYZAAR) 50-12.5 mg per tablet, TAKE 1 TABLET EVERY DAY, Disp: 90 tablet, Rfl: 3    metoprolol succinate (TOPROL-XL) 50 MG 24 hr tablet, TAKE 1/2 TABLET EVERY DAY, Disp: 45 tablet, Rfl: 3    multivitamin (THERAGRAN) per tablet, Take 1 tablet by mouth once daily., Disp: , Rfl:     pentosan polysulfate (ELMIRON) 100 mg Cap, Take 1 capsule (100 mg total) by mouth 3 (three) times daily., Disp: 90 capsule, Rfl: 3    PREMPRO 0.3-1.5 mg per tablet, Take 1 tablet by mouth once daily., Disp: 84 tablet, Rfl: 3    RABEprazole (ACIPHEX) 20 mg tablet, TAKE 1 TABLET EVERY DAY, Disp: 90 tablet, Rfl: 3    simvastatin (ZOCOR) 20 MG tablet, TAKE 1 TABLET (20 MG TOTAL) BY MOUTH EVERY EVENING., Disp: 90 tablet, Rfl: 3    traMADoL (ULTRAM) 50 mg tablet, Take 1 tablet (50 mg total) by mouth 2 (two) times daily., Disp: 60 tablet, Rfl: 0    turmeric root extract 500 mg Cap, Take by mouth., Disp: , Rfl:     UNABLE TO FIND, medication name: Herbevision, Disp: , Rfl:     betamethasone acetate-betamethasone sodium phosphate (CELESTONE) 6 mg/mL injection, 1 mL., Disp: , Rfl:     estradioL (ESTRACE) 0.01 % (0.1 mg/gram) vaginal cream, APPLY 1 GRAM AS DIRECTED EVERY NIGHT AT BEDTIME, Disp: , Rfl:     ferrous sulfate (FEOSOL) 325 mg (65 mg iron) Tab tablet, Take 325 mg by mouth daily with breakfast., Disp: , Rfl:     PRE-PESSARY CHECK COUNSELING:  The patient was informed of the risks of pain or discomfort, continuous incontinence, urinary retention with insertion of a pessary and malodorous discharge and/or possible bleeding from granulation tissue after wearing the pessary for sometime. She was counseled on the alternatives to pessary insertion, including surgery or no treatment with continued symptoms, and she agrees to  proceed.    PROCEDURE:  TIME OUT PERFORMED.  The pessary was removed and cleaned with soap and water.  There was no granulation tissue present. There is a mild area of erythema along the vaginal wall at the site of the pessary but no ulceration or granulation was noted.  There was a slight discharge present. No blood noted in vaginal vault  The pessary was re-inserted.  The patient was able to sit, stand, walk and either cough or urinate on the toilet after the procedure normally without expelling the pessary. The patient tolerated the procedure well.      ASSESSMENT:  Pelvic relaxation managed with pessary. 618.9.    POST PESSARY CHECK COUNSELING:  Report worsening urinary incontinency, urinary retention, pain, fever, foul smelling discharge or bleeding.  Importance of keeping follow-up appointments for a pessary check stressed.    Counseling lasted approximately 10 minutes and all her questions were answered.    FOLLOW-UP:     Urine culture

## 2023-12-08 LAB
BACTERIA UR CULT: NORMAL
BACTERIA UR CULT: NORMAL

## 2023-12-11 ENCOUNTER — HOSPITAL ENCOUNTER (OUTPATIENT)
Dept: RADIOLOGY | Facility: HOSPITAL | Age: 78
Discharge: HOME | End: 2023-12-11
Attending: SPECIALIST
Payer: MEDICARE

## 2023-12-11 ENCOUNTER — CLINICAL SUPPORT (OUTPATIENT)
Dept: REHABILITATION | Facility: HOSPITAL | Age: 78
End: 2023-12-11
Attending: SPECIALIST
Payer: MEDICARE

## 2023-12-11 DIAGNOSIS — R13.19 ESOPHAGEAL DYSPHAGIA: ICD-10-CM

## 2023-12-11 PROCEDURE — A9698 NON-RAD CONTRAST MATERIALNOC: HCPCS | Performed by: SPECIALIST

## 2023-12-11 PROCEDURE — 97535 SELF CARE MNGMENT TRAINING: CPT | Mod: PN

## 2023-12-11 PROCEDURE — 25500020 PHARM REV CODE 255: Performed by: SPECIALIST

## 2023-12-11 PROCEDURE — 92611 MOTION FLUOROSCOPY/SWALLOW: CPT | Mod: PN

## 2023-12-11 PROCEDURE — 92610 EVALUATE SWALLOWING FUNCTION: CPT | Mod: 59,PN

## 2023-12-11 PROCEDURE — 74230 X-RAY XM SWLNG FUNCJ C+: CPT | Mod: TC

## 2023-12-11 RX ADMIN — BARIUM SULFATE 148 ML: 0.81 POWDER, FOR SUSPENSION ORAL at 02:12

## 2023-12-12 NOTE — PLAN OF CARE
Addended by: AUDREY BURRELL on: 9/23/2020 03:01 PM     Modules accepted: Orders     [facility]  MODIFIED BARIUM SWALLOW STUDY      Date: 12/11/2023     Name: Evy Mendoza   MRN: [id number]    Therapy Diagnosis: esophageal dysphagia; mild pharyngeal dysphagia     Physician: Rakesh Devine MD  Physician Orders: SLP Video Swallow  Medical Diagnosis from Referral: R13.19 (ICD-10-CM) - Esophageal dysphagia    Date of Evaluation:  12/11/2023    Time In:  1427  Time Out:  1504  Total Billable Time: 37     Procedure Min.   Swallow and Oral Function Evaluation   10   Fl Modified Barium Swallow Speech  17   Home Management and Self-Care 10     Precautions: Standard    Subjective   Patient arrived to radiology suite, unaccompanied. Patient was engaged, cooperative, and motivated to participate within the evaluation procedure.    Date of Onset: within the last year noticed choking sensation on her saliva    Past Medical History: Evy Mendoza  has a past medical history of Arthritis, Cataract, Fibrocystic breast, GERD (gastroesophageal reflux disease), Hyperlipidemia, Hypertension, Interstitial cystitis, Osteoporosis, and Sleep apnea.  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    The patient is a 78 y.o. female who complains of coughing and choking with her saliva, mentions xerostomia however. Out of nowhere, she will go into a coughing spell, thinks it is from her saliva. She denies dysphagia to solids, liquids, or medications, odynophagia, or globus sensation. She feels this testing is being completed for a rule-out to ensure dysphagia is not present. She mentions taking several medications and supplements per her doctor's instructions, concern for polypharmacy.    Patient is currently taking the  following medications: has a current medication list which includes the following prescription(s): acetaminophen, albuterol, ascorbic acid, betamethasone acetate-betamethasone sodium phosphate, calcium-vitamin d3, carbamazepine, cetirizine, cinnamon bark, cyanocobalamin, prolia, prolia, docusate sodium, estradiol, ezetimibe, famotidine, ferrous sulfate, fish oil-omega-3 fatty acids, fluticasone propionate, glucos sul 2kcl/msm/chond/c/mn, lactulose, losartan-hydrochlorothiazide 50-12.5 mg, metoprolol succinate, multivitamin, elmiron, prempro, rabeprazole, simvastatin, tramadol, turmeric root extract, and UNABLE TO FIND.    History was provided by patient and/or taken from chart review:   -Current diet at home: IDDSI 7/0 (regular solids/thin liquids)  -Recommended diet from previous study: n/a  -Therapy received: No prior ST  -Imaging: CT Cervical Spine 11/29/21: Previous laminectomy and fusion C3-6. Central disc protrusion C2-3 with near contact with the anterior cord. Broad-based disc-osteophyte C6-7 with mild impression upon the anterior spinal canal.  -Surgery: as above in imaging note    In consideration of the interrelationships between body systems and swallowing, History was provided by patient and/or taken from chart review. The following are medical conditions present in the patient's history which can result in or be attributed to dysphagia:  Respiratory None noted in this category   Cardiovascular None noted in this category   Digestive GERD - 1x a day morning   Infections  None noted in this category   Urinary None noted in this category   Endocrine None noted in this category   Nervous Trigeminal Neuralgia - several years   Skeletal PCDF 11 years ago   Immune None noted in this category   Cancer None noted in this category   Psychiatric  None noted in this category   Congenital  None noted in this category   Other None noted in this category   Benign tumor in neck - UE deficits 11 years ago PCDF    The  following observations were made:   -Mental status: Alert and Cooperative  -Factors affecting performance: no difficulties participating in the study  -Feeding Method: independent in self-feeding    Respiratory Status:   -Respiratory Status: room air    Medical Hx and Allergies:    Review of patient's allergies indicates:   Allergen Reactions    Coffee Other (See Comments)     congestion    Cottonseed Other (See Comments)     congestion    Cely Other (See Comments)     congestion    Lettuce Other (See Comments)     congestion    Onion Other (See Comments)     congestion     Medications that May Affect Swallowing    Anti-Convulsants/Anti-Epileptics  (Used to treat seizure disorder)  [x]Carbamazepine (Tegretol)  []Lorazepam (Ativan)  []Clonazepam (Klonopin)  []Phenobarbitol  []Gabapentin (Neurontin)  []Phentoin (Dilantin)  []Levetiracetam (Keppra)  []Valproic Acid (Depakote)    Beta-Blockers  (Used to treat hypertension arrhythmia, a-fib, angina, CHF, migraine prophylaxis)  []Acebutolol (Sectol)  []Carvedilol (Coreg)  [x]Metoprolol (Lopressor)  []Nadolel (Corgard)    Pain Scale:  0/10 on VAS currently.   Pain Location: n/a    EAT-10 Score:    Eating Assessment Tool (EAT-10) is a questionnaire given to the patient to  her swallowing function.     Key: 0= no problem; 4= severe problem  1. My swallowing problem has caused me to lose weight. - 0  2. My swallowing problem interferes with my ability to go out for meals. - 0  3. Swallowing liquids takes extra effort. - 0  4. Swallowing solids takes extra effort. - 0  5. Swallowing pills takes extra effort. - 0  6. Swallowing is painful. - 0  7. The pleasure of eating is affected by my swallowing. - 0  8. When I swallow food sticks in my throat. - 0  9. I cough when I eat. - 0  10. Swallowing is stressful. - 0    James ranked her swallowing function as a 0/40     Interpretation: Score of greater than 3 is indicative of a swallowing disorder (Isabella et al., 2008);  "higher scores correlate with increased penetration-aspiration  scale scores, and scores greater than 15 results in the patient being 2.2 times more likely to aspirate (Linda et al., 2015)    References:   JEREL Mason., KIMBERLEE Mckeon., DURGA Galicia., BENNETT Luong., ZAIN Hernadez., BENNETT Dorantes, & LACHO Clarke (2008). Validity and reliability of the Eating Assessment Tool (EAT-10). Annals of Otology, Rhinology & Laryngology, 117(12), 919-924. [email or web address]  KIMBERLEE Mckeon., JUSTICE Lehman, BENNETT Blair., Rishi, M. A., & JEREL Mason (2015). The ability of the 10-item eating assessment tool (EAT-10) to predict aspiration risk in persons with dysphagia. Annals of Otology, Rhinology & Laryngology, 124(5), 351-354. https://[email or web address]/[email or web address]     Reflux Symptom Index (RSI) was completed to assess the possible presence and/or severity of LPR symptoms and any relationship between this condition and the pt's dysphagia; score of ~15 may indicate LPR):     Hoarseness or a problem with your voice: 0  Clearing your throat: 1  Excess throat or mucous post-nasal drip:3  Difficulty swallowing food, liquid, or pills:0  Coughing after you ate or after lying down: 0  Breathing difficulties or choking episodes: 1  Troublesome or annoying cough: 0  Sensation or something sticking in your throat: 0  Heartburn, chest pain, indigestion, or stomach acid coming up: 2    TOTAL: 7/45    Objective     Modified Barium Swallow Study  Purpose: to evaluate anatomy and physiology of the oropharyngeal swallow, to determine effectiveness of rehabilitation strategies, and to determine diet consistency and intervention recommendations. The study was performed using the "Gold Standard" of 30 fps with as low as reasonably achievable (ALARA) exposure.     The patient was seen in radiology seated in High Marcano's position in a video imaging chair for lateral views of the larynx and an A/P view. The study was " conducted using Varibar thin liquid (IDDSI 0), Varibar nectar liquid (IDDSI 2), Varibar pudding (IDDSI 4), Peaches covered in Varibar powder (IDDSI 6/2), solid coated in Varibar pudding (IDDSI 7), and 13mm barium tablet with water. She tolerated the procedure well.     A cranial nerve examination revealed the following:  Cranial Nerve Examination  Cranial Nerve 5: Trigeminal Nerve  Motor Jaw Posture at rest: Closed  Mandible Elevation/Depression: WFL  Mandible lateralization: WFL  Abnormal movement: absent Interpretation: Intact bilaterally    Sensory Forehead: WFL  Cheek: WFL  Jaw: WFL  Facial Pain: None noted Interpretation: Intact bilaterally      Cranial Nerve 7: Facial Nerve  Motor Facial Symmetry: WNL  Wrinkle Forehead: WFL  Close eyes tightly: WFL  Labial Protrusion: WFL  Labial Retraction: WFL  Buccal Strength with Labial Seal: WFL  Abnormal movement: absent Interpretation: Intact bilaterally    Sensory Formal testing not completed.       Cranial Nerves IX and X: Glossopharyngeal and Vagus Nerves  Motor Palatal Symmetry (Rest): WNL  Palatal Symmetry (Movement): WNL  Cough: Perceptually strong  Voice Prior to PO intake: Clear  Resonance: Normal  Abnormal movement: absent Interpretation: Intact bilaterally      Cranial Nerve XII: Hypoglossal Nerve  Motor Tongue at rest: WNL  Lingual Protrusion: WNL  Lingual Protrusion against Resistance: WNL  Lingual Lateralization: WNL  Abnormal movement: absent Interpretation: Intact bilaterally      Other information:  Mucosal Quality: No abnormal findings  Secretion Management: no overt deficits noted/observed; uses biotene for xerostomia   Dentition: Good condition for speech and mastication; lower plate    Oral mechanism examination reveals no significant impairments in the anatomy and physiology for achieving nutrition/hydration.    CONSISTENCIES ADMINISTERED:    Consistency  Presentation  Findings Rosenbeck's Penetration/Aspiration Scale (PAS) Strategy Attempted     Thin Liquid (IDDSI 0) Method: Self-fed    Volume: 5ml x2 Self-regulated straw sip x3 Rapid consecutive straw sip x1 2 peaches in juice x1    Projection: lateral view; AP view    Oral phase: no anterior spillage; good bolus hold on command; good anterior-posterior transit of liquids for swallow initiation which occurs consistently at the ramus of the mandible; there is a trace amount of oral residue located on the posterior aspect of the oral tongue cleared with spontaneous secondary swallow    Pharyngeal phase: good anterior movement of the hyoid as well as good elevation of the larynx with adequate airway closure; no penetration or aspiration events observed; a trace amount of residue remains consistently in the vallecula and piriform sinus following initial swallows either cleared spontaneously with secondary swallow or cleared through next trial; scattered residue remained with the first single straw sip of thin liquid in the vallecula, piriform sinus, and posterior pharyngeal wall but otherwise within functional limits    Esophageal screen: 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 without impact to bolus flow Best: (1) Material does not enter the airway    Worst: (1) Material does not enter the airway     No strategies completed or needed   Mildly-Thick Liquid (IDDSI2)  (Formerly nectar-thick) Method: Self-fed    Volume: 5ml x1 Self-regulated straw sip x1    Projection: lateral view Oral phase: good oral containment with no anterior spillage; good anterior-posterior transit of liquids; trace amounts of residue remain on the posterior oral tongue; initiation of the swallow occurred at the ramus of the mandible    Pharyngeal phase: trace residuals remained on the base of tongue and lateral channels; good airway protection with no penetration or aspiration events identified    Esophogeal screen: 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 without impact to bolus flow   Best: (1) Material does not  enter the airway    Worst: (1) Material does not enter the airway   No strategies completed or needed   Puree (IDDSI 4) Method: Self-fed    Volume: 5ml x3    Projection: lateral view; AP view  Oral phase: good oral containment without loss of bolus; good anterior-posterior transit; piecemeal deglutition noted with first trial of puree consistency, cleared with spontaneous secondary swallow; otherwise collection of residue remained on the posterior oral tongue    Pharyngeal phase: appropriate aggregation of the bolus in the vallecula prior to swallow initiation; trace residue remained on the base of tongue and vallecula as well as the laryngeal surface of the epiglottis/lateral channels, cleared with secondary swallow    Esophageal screen: delayed emptying/retention of bolus and 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 without impact to bolus flow      5mL pudding  Esophageal retention Best: (1) Material does not enter the airway    Worst: (1) Material does not enter the airway No strategies completed or needed   Mixed consistency (IDDSI 6/0)   Soft and bite-sized + thin liquid  Method: Self-fed     Volume: 2 peaches in juice x1    Projection: lateral view;   Oral phase: good mastication and manipulation of solid consistencies, no bolus loss; trace residuals remain in the oral cavity, cleared with subsequent swallow    Pharyngeal phase: appropriate aggregation of material in the vallecula prior to swallow initiation; good airway protection with no penetration or aspiration events; no pharyngeal residue    Esophageal screen: 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 without impact to bolus flow Best: (1) Material does not enter the airway    Worst: (1) Material does not enter the airway     No strategies completed or needed   Solid (IDDSI 7) Method: Self-fed    Volume: bite x1    Projection: lateral view Oral phase: good mastication and manipulation of solid consistencies, no bolus loss; trace  residuals remain on the posterior oral tongue, cleared with subsequent swallow    Pharyngeal phase:  good airway protection with no penetration or aspiration events; no pharyngeal residue    Esophageal screen: 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 It does not impede or hinder bolus flow through the pharynx. Discussed osteophyte as previously identified in CT cervical spine which may cause some food to become lodged there if not masticated well enough. Patient verbalized understanding. Discussed esophageal retention and possible need for followup with GI since this seems to be a new problem that patient is reporting. She  mentioned previous diagnosis of hiatal hernia causing this retention. She was due to follow up with her GI specialist at the beginning of the year, but had to postpone due to her doctor being out of the office.    The Sal Assessment of Swallowing Ability (MASA) is a screening tool for identifying swallowing disorders in patients. The MASA contains 24 clinical items that are measured via 5 point to 10 point rating scales. Clinical areas of assessment include: alertness, cooperation, auditory comprehension, aphasia, apraxia, dysarthria, saliva control/management, lip seal, tongue movement, tongue strength, tongue coordination, oral preparation, respiration, respiratory rate for swallowing, gag reflex, palatal movement, bolus clearance, oral transit time, cough reflex, voluntary cough, voice, tracheostomy, pharyngeal phase, and pharyngeal  response.The total score of the MASA is 200 points and the results of the MASA are interpreted as follows:  ?178 =   no abnormality  168-177= mild dysphagia  139-167 = moderate dysphagia    ?138 = severe dysphagia  Patient score = 192     Category Score   Alertness 10   Co-operation 10   Auditory Comprehension 10   Respiration 10   Respiratory Rate 5   Aphasia (Dysphasia) 5   Apraxia (Dyspraxia) 5   Dysarthria 5   Saliva 5   Lip Seal 5   Tongue Movement 10   Tongue Strength 10   Tongue Coordination 10   Oral Preparation 10   Gag 5   Palate 10   Bolus Clearance 8   Oral Transit 10   Cough reflex 5   Cough voluntary 10   Voice 6   Trachae 10   Pharyngeal Phase 8   Pharyngeal Response 10     Functional Oral Intake Scale (FOIS)  The Functional Oral Intake Scale (FOIS) is an ordinal scale that is used to assess the current status and meaningful change in the oral intake. FOIS levels include:    TUBE DEPENDENT (levels 1-3) 1. No oral intake  2. Tube dependent with minimal/inconsistent oral intake  3. Tube supplements with consistent oral intake      TOTAL ORAL INTAKE (levels 4-7) 4. Total oral intake of a single consistency  5. Total oral intake of multiple consistencies requiring special preparation  6. Total oral intake with no special preparation, but must avoid specific foods or liquid items  7. Total oral intake with no restrictions     Patient is currently judged to be at FOIS level 7.      The Modified Barium Swallowing Impairment Profile: MBSiMP:   Oral Phase   1) Lip Closure: 0 - No labial escape  2) Tongue Control During Bolus Hold: 0 - Cohesive bolus between tongue to palatal seal  3) Bolus Preparation/Mastication: 0 - Timely and efficient chewing and mashing  4) Bolus Transport/Lingual Motion: 0 - Brisk tongue motion  5) Oral Residue: 1 - Trace residue lining oral structures  6) Initiation of Pharyngeal Swallow: 0 - Bolus head at posterior angle of ramus (first hyoid excursion)     Pharyngeal Phase  7) Soft  Palate Elevation: 0 - No bolus between soft palate (SP)/pharyngeal wall (PW)  8) Laryngeal Elevation: 0 - Complete superior movement of thyroid cartilage with complete approximation of arytenoids to epiglottic petiole  9) Anterior Hyoid Excursion: 0 - Complete anterior movement  10) Epiglottic Movement: 0 - Complete inversion  11) Laryngeal Vestibular Closure: 0 - Complete; no air/contrast in laryngeal vestibule  12) Pharyngeal Stripping Wave: 1 - Present; diminished  13) Pharyngeal Contraction: 0 - Complete  14) Pharyngoesophageal Segment Openin - Complete distention and complete duration; no obstruction of flow  15) Tongue Base (TB) Retraction: 1 - Trace column of contrast or air between TB and PW  16) Pharyngeal Residue: 1 - Trace residue within or on pharyngeal structures    Esophageal Phase:  17) Esophageal Clearance Upright Position: 1 - Esophageal retention    Oral Phase Impairment Total Score: 1  Pharyngeal Phase Impairment Total Score: 3  Esophageal Phase Impairment Total Score: 1    Total MBSiMP Score: 5  Treatment   Total Treatment Time: 10 minutes  Patient educated regarding results and recommendations of the evaluation. See the recommendations section below.    Education: Plan of Care, role of SLP in care, and anatomy and physiology of swallow mechanism as it relates to MBSS findings and recommendations were discussed with the patient. Patient expressed understanding. All questions were answered.     Assessment     Evy Mendoza is a 78 y.o. female referred for Modified Barium Swallow Study with a medical diagnosis of esophageal dysphagia. Patient denies any difficulties with swallowing foods, liquids, or pills. She endorses a hoarse vocal quality since childhood, intermittent and random coughing and choking on her saliva to the point of feeling as if she will vomit, as well as xerostomia. There is no associated weight loss, odynophagia, or globus sensation reported.  An oral mechanism and  cranial nerve examination was completed, notable for adequate anatomy and physiology with no concern for the ability to achieve nutrition and hydration. Evy Mendoza self-reported swallowing to be not impacted as identified through use of patient reported outcome measures.  The patient consumed a variety of volumes and viscosities of barium to assess the physiological function and integrity of the swallow mechanism. This instrumental assessment utilized 2 minutes, 35 seconds of fluoroscopy time for evaluative purposes.     In regard to the patient's swallow function, the oral phase is remarkable for good oral containment with no anterior spillage, good anterior-posterior transit of all consistencies, with only trace amounts of residue remaining on the posterior oral tongue, which was cleared spontaneously through liquid wash or dry saliva swallow. Initiation of the swallow consistently occurred at the ramus of the mandible for all consistencies when appropriate, and then at the level of the vallecula following appropriate aggregation of solids and liquids prior to swallow initiation for puree and mixed consistencies. Pharyngeal phase is remarkable for good upward and anterior movement of the hyolaryngeal complex with good airway protection. There were no penetration or aspiration events identified during this evaluation. A trace amount of residue remained in the vallecula following initial swallows, mostly cleared with spontaneous secondary swallows. On one occasion, this increased to a collection amount in the vallecula with a straw sip of liquid, again cleared spontaneously. 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 Additionally, as mentioned in previous CT cervical spine imaging, there is an osteophyte at C6-C7 which does not impede bolus flow but could easily cause globus sensation should patient not masticate solids well.  Esophageal screening  completed with esophageal retention noted, as best seen in anterior-posterior video. Further assessment with our GI colleagues is recommended given patient's persistent symptomology.     Treatment/Plan     Individualized treatment was completed in consideration for home management and self-care for her swallowing abilities. Anatomy and physiology of the patient's swallow was completed in detail, focusing on the above-mentioned structural differences, as well as physiological components possibly leading to her symptomology. Specifically, we discussed use of alternating bites/sips to allow for esophageal retention to clear, as well as using moistened solids in smaller bites to hopefully avoid globus sensation. Patient participated in plan for further assessment with her GI provider to address esophageal retention. Universal aspiration precautions were reviewed, as well as the typical aging swallow and its impact on motility and functioning. Patient verbalized understanding to all.    Recommendations:     Consistency Recommendations: Thin liquids (IDDSI 0) and Soft and Bite-Sized consistencies (IDDSI 6).  Avoid dry, crumbly foods.  Risk Management: use good oral hygiene , sit upright for all PO intake, behavioral reflux precautions, alternate bites and sips, allow extra time for meals, and eat small meals throughout the day to reduce discomfort associated with delayed emptying of the esophagus  Specialist Referrals: GI  Ancillary Tests: Consider Barium Esophagram   Therapy: Dysphagia therapy is not recommended at this time.  Follow-up exam: Follow up swallow study is not indicated at this time.    Please contact [facility] at [phone] if there are questions or concerns.    Therapist's Name:   Geno Guerrero MS, CCC-SLP, CBIS  Speech-Language Pathologist  Certified Brain Injury Specialist  12/7/2023

## 2023-12-12 NOTE — PATIENT INSTRUCTIONS
Aspiration Precautions:    Tips for safe eating and drinking:   Clean your mouth before and after meals.  Make sure your mouth is clean and moist before starting your meal. When you are finished, clean your mouth again. Make sure there is no food around the teeth or stuck in your cheeks.   Be alert. Eat, drink, and take your medications only when you are alert and paying attention.   Reduce distractions. Turn off the TV and reduce distractions while you are eating and drinking.  Sit upright. Sit fully upright for your meals and to take your medications. It's best to eat and drink while sitting in a chair, unless your healthcare provider gave you other positioning instructions.   Stay upright. Stay fully upright for at least 30 minutes after a meal. You may also need to avoid eating 1-2 hours before bed.   Stay active.  Exercise as directed by your healthcare provider. Staying active helps your lungs stay clear.  Keep your lungs clear. Ask your doctor to recommend other therapy or devices to help you with the strength of your cough and the overall clearance of your lungs.     Copyright [employer] 2017

## 2023-12-17 NOTE — PROGRESS NOTES
· Consistency Recommendations: Thin liquids (IDDSI 0) and Soft and Bite-Sized consistencies (IDDSI 6).  Avoid dry, crumbly foods.  · Risk Management: use good oral hygiene , sit upright for all PO intake, behavioral reflux precautions, alternate bites and sips, allow extra time for meals, and eat small meals throughout the day to reduce discomfort associated with delayed emptying of the esophagus    Order barium esophagogram

## 2023-12-19 ENCOUNTER — HOSPITAL ENCOUNTER (OUTPATIENT)
Dept: RADIOLOGY | Facility: HOSPITAL | Age: 78
Discharge: HOME | End: 2023-12-19
Attending: OBSTETRICS & GYNECOLOGY
Payer: MEDICARE

## 2023-12-19 VITALS — WEIGHT: 184 LBS | HEIGHT: 64 IN | BODY MASS INDEX: 31.41 KG/M2

## 2023-12-19 DIAGNOSIS — Z12.31 ENCOUNTER FOR SCREENING MAMMOGRAM FOR MALIGNANT NEOPLASM OF BREAST: ICD-10-CM

## 2023-12-19 DIAGNOSIS — Z12.39 ENCOUNTER FOR BREAST CANCER SCREENING USING NON-MAMMOGRAM MODALITY: ICD-10-CM

## 2023-12-19 DIAGNOSIS — M81.0 AGE-RELATED OSTEOPOROSIS WITHOUT CURRENT PATHOLOGICAL FRACTURE: ICD-10-CM

## 2023-12-19 PROCEDURE — 77063 BREAST TOMOSYNTHESIS BI: CPT | Mod: 26,,, | Performed by: RADIOLOGY

## 2023-12-19 PROCEDURE — 77080 DXA BONE DENSITY AXIAL: CPT | Mod: TC

## 2023-12-19 PROCEDURE — 77063 MAMMO DIGITAL SCREENING BILAT WITH TOMO: ICD-10-PCS | Mod: 26,,, | Performed by: RADIOLOGY

## 2023-12-19 PROCEDURE — 77080 DXA BONE DENSITY AXIAL SKELETON 1 OR MORE SITES: ICD-10-PCS | Mod: 26,,, | Performed by: RADIOLOGY

## 2023-12-19 PROCEDURE — 77067 MAMMO DIGITAL SCREENING BILAT WITH TOMO: ICD-10-PCS | Mod: 26,,, | Performed by: RADIOLOGY

## 2023-12-19 PROCEDURE — 77067 SCR MAMMO BI INCL CAD: CPT | Mod: 26,,, | Performed by: RADIOLOGY

## 2023-12-19 PROCEDURE — 77080 DXA BONE DENSITY AXIAL: CPT | Mod: 26,,, | Performed by: RADIOLOGY

## 2023-12-19 PROCEDURE — 77067 SCR MAMMO BI INCL CAD: CPT | Mod: TC

## 2024-01-02 ENCOUNTER — HOSPITAL ENCOUNTER (OUTPATIENT)
Dept: RADIOLOGY | Facility: HOSPITAL | Age: 79
Discharge: HOME | End: 2024-01-02
Attending: FAMILY MEDICINE
Payer: MEDICARE

## 2024-01-02 DIAGNOSIS — M54.59 OTHER LOW BACK PAIN: Primary | ICD-10-CM

## 2024-01-02 DIAGNOSIS — M54.59 OTHER LOW BACK PAIN: ICD-10-CM

## 2024-01-02 DIAGNOSIS — M25.551 RIGHT HIP PAIN: Primary | ICD-10-CM

## 2024-01-02 DIAGNOSIS — M25.551 RIGHT HIP PAIN: ICD-10-CM

## 2024-01-02 PROCEDURE — 72100 X-RAY EXAM L-S SPINE 2/3 VWS: CPT | Mod: 26,,, | Performed by: RADIOLOGY

## 2024-01-02 PROCEDURE — 72100 X-RAY EXAM L-S SPINE 2/3 VWS: CPT | Mod: TC

## 2024-01-02 PROCEDURE — 73502 X-RAY EXAM HIP UNI 2-3 VIEWS: CPT | Mod: TC,RT

## 2024-01-02 PROCEDURE — 73502 X-RAY EXAM HIP UNI 2-3 VIEWS: CPT | Mod: 26,RT,, | Performed by: RADIOLOGY

## 2024-01-04 ENCOUNTER — PROCEDURE VISIT (OUTPATIENT)
Dept: NEUROLOGY | Facility: CLINIC | Age: 79
End: 2024-01-04
Payer: MEDICARE

## 2024-01-04 DIAGNOSIS — G24.3 CERVICAL DYSTONIA: Primary | ICD-10-CM

## 2024-01-04 PROCEDURE — 64616 CHEMODENERV MUSC NECK DYSTON: CPT | Mod: 50,S$GLB,, | Performed by: PSYCHIATRY & NEUROLOGY

## 2024-01-04 NOTE — PROCEDURES
BOTOX PROCEDURE NOTE     Date of Procedure:1/4/2024    Reason for Procedure: Cervical dystonia       Informed consent was obtained prior to performing this study. Two patient identifiers were confirmed with the patient prior to performing this study. A time out to determine correct patient and and agreement on procedure performed was conducted prior to the injections.     Procedure Details: After informed consent obtained the patient's head and upper neck was cleansed with alcohol rub and 300 Units of Botox (diluted 1:1) was injected in the following bilateral muscles:   50 units in each Trapezius*  40 units in each Levator Scapulae*  20 units in each Splenius Capitus*  20 units in each Longissimus*  10 units in each Spenius Cervices*  10 units in each Semispinalis Capitus*    *= denotes bilateral injections    The patient tolerated the procedure well with less than 0.25cc of blood loss. She was observed for several minutes post injection and given a handout from [employer] regarding when and where to seek help if side effects are experienced.    RTC in 6 months for more Botox, but after 12 weeks if needed at any point  Botox  continues to greatly help her symptoms of neck pain and dystonia and she is doing well with twice yearly injections  She sees Dr ADAM Liang for lumbar injection PRN-  she also uses meds  PRN with PCP instead  and had updated MRI L spine as requested by Dr Dejesus which was largely unchanged and he did not recommend surgery in 2018  -Suggested she see pain management back as needed noting she  SHIRLENE failed prior/ also  being treated with PCP  Trigeminal neuralgia pain improved as documented prior and PCP follows this and labs  MRI C spine 1/2018 showed mild spinal stenosis per report  Cautioned her re: Reglan Rx per PCP and the potential for worsening dystonia and other movement side effects. She has discontinued use since  Note she is being evaluated for dysphagia by other providers. She does not  notice this worsening with Botox. Thought to be due to a large hiatal hernia plus her neck disorder/ forward flexion. Notify me if worse soon after Botox dosing. Dose could be lowered if needed. Currently, she feels great benefit with Botox

## 2024-01-31 NOTE — TELEPHONE ENCOUNTER
Pt requesting refill on Prempro to [facility]. She will be due for her wwe in February

## 2024-02-01 RX ORDER — CONJUGATED ESTROGENS AND MEDROXYPROGESTERONE ACETATE .3; 1.5 MG/1; MG/1
1 TABLET, SUGAR COATED ORAL
Qty: 84 TABLET | Refills: 0 | Status: SHIPPED | OUTPATIENT
Start: 2024-02-01 | End: 2024-04-24

## 2024-02-02 DIAGNOSIS — M81.0 AGE-RELATED OSTEOPOROSIS WITHOUT CURRENT PATHOLOGICAL FRACTURE: Primary | ICD-10-CM

## 2024-02-03 ENCOUNTER — HOSPITAL ENCOUNTER (OUTPATIENT)
Dept: RADIOLOGY | Facility: HOSPITAL | Age: 79
Discharge: HOME | End: 2024-02-03
Attending: SPECIALIST
Payer: MEDICARE

## 2024-02-03 DIAGNOSIS — G89.29 CHRONIC PAIN OF RIGHT KNEE: ICD-10-CM

## 2024-02-03 DIAGNOSIS — M25.561 CHRONIC PAIN OF RIGHT KNEE: ICD-10-CM

## 2024-02-03 PROCEDURE — 73560 X-RAY EXAM OF KNEE 1 OR 2: CPT | Mod: TC,RT

## 2024-02-03 PROCEDURE — 73560 X-RAY EXAM OF KNEE 1 OR 2: CPT | Mod: 26,RT,, | Performed by: RADIOLOGY

## 2024-02-12 ENCOUNTER — INFUSION (OUTPATIENT)
Dept: INFUSION THERAPY | Facility: HOSPITAL | Age: 79
End: 2024-02-12
Attending: OBSTETRICS & GYNECOLOGY
Payer: MEDICARE

## 2024-02-12 VITALS
TEMPERATURE: 98 F | DIASTOLIC BLOOD PRESSURE: 78 MMHG | HEART RATE: 74 BPM | SYSTOLIC BLOOD PRESSURE: 142 MMHG | RESPIRATION RATE: 18 BRPM

## 2024-02-12 DIAGNOSIS — M81.0 AGE-RELATED OSTEOPOROSIS WITHOUT CURRENT PATHOLOGICAL FRACTURE: Primary | ICD-10-CM

## 2024-02-12 PROCEDURE — 96372 THER/PROPH/DIAG INJ SC/IM: CPT

## 2024-02-12 PROCEDURE — 63600175 PHARM REV CODE 636 W HCPCS: Mod: JZ,TB | Performed by: OBSTETRICS & GYNECOLOGY

## 2024-02-12 RX ADMIN — DENOSUMAB 60 MG: 60 INJECTION SUBCUTANEOUS at 01:02

## 2024-03-11 ENCOUNTER — OFFICE VISIT (OUTPATIENT)
Dept: WOUND CARE | Facility: HOSPITAL | Age: 79
End: 2024-03-11
Attending: SURGERY
Payer: MEDICARE

## 2024-03-11 VITALS — DIASTOLIC BLOOD PRESSURE: 87 MMHG | SYSTOLIC BLOOD PRESSURE: 139 MMHG | HEART RATE: 68 BPM

## 2024-03-11 DIAGNOSIS — T31.0 BURN (ANY DEGREE) INVOLVING LESS THAN 10% OF BODY SURFACE: ICD-10-CM

## 2024-03-11 DIAGNOSIS — T23.241A PARTIAL THICKNESS BURN OF MULTIPLE DIGITS OF RIGHT HAND INCLUDING PARTIAL THICKNESS BURN OF THUMB, INITIAL ENCOUNTER: Primary | ICD-10-CM

## 2024-03-11 PROCEDURE — 99499 UNLISTED E&M SERVICE: CPT | Mod: ,,, | Performed by: SURGERY

## 2024-03-11 PROCEDURE — 99213 OFFICE O/P EST LOW 20 MIN: CPT | Mod: 25

## 2024-03-11 PROCEDURE — 16020 DRESS/DEBRID P-THICK BURN S: CPT
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
## 2024-03-14 ENCOUNTER — HOSPITAL ENCOUNTER (OUTPATIENT)
Dept: RADIOLOGY | Facility: HOSPITAL | Age: 79
Discharge: HOME | End: 2024-03-14
Attending: NURSE PRACTITIONER
Payer: MEDICARE

## 2024-03-14 DIAGNOSIS — M25.559 PAIN, HIP: ICD-10-CM

## 2024-03-14 PROCEDURE — 73721 MRI JNT OF LWR EXTRE W/O DYE: CPT | Mod: TC,LT

## 2024-03-14 PROCEDURE — 73721 MRI JNT OF LWR EXTRE W/O DYE: CPT | Mod: 26,LT,, | Performed by: RADIOLOGY

## 2024-03-18 ENCOUNTER — OFFICE VISIT (OUTPATIENT)
Dept: WOUND CARE | Facility: HOSPITAL | Age: 79
End: 2024-03-18
Attending: SURGERY
Payer: MEDICARE

## 2024-03-18 VITALS
RESPIRATION RATE: 18 BRPM | TEMPERATURE: 98 F | DIASTOLIC BLOOD PRESSURE: 80 MMHG | HEART RATE: 70 BPM | SYSTOLIC BLOOD PRESSURE: 130 MMHG

## 2024-03-18 DIAGNOSIS — T23.241A PARTIAL THICKNESS BURN OF MULTIPLE DIGITS OF RIGHT HAND INCLUDING PARTIAL THICKNESS BURN OF THUMB, INITIAL ENCOUNTER: Primary | ICD-10-CM

## 2024-03-18 DIAGNOSIS — T31.0 BURN (ANY DEGREE) INVOLVING LESS THAN 10% OF BODY SURFACE: ICD-10-CM

## 2024-03-18 PROCEDURE — 99212 OFFICE O/P EST SF 10 MIN: CPT

## 2024-03-18 PROCEDURE — 99499 UNLISTED E&M SERVICE: CPT | Mod: ,,, | Performed by: SURGERY
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
## 2024-04-24 RX ORDER — CONJUGATED ESTROGENS AND MEDROXYPROGESTERONE ACETATE .3; 1.5 MG/1; MG/1
1 TABLET, SUGAR COATED ORAL DAILY
Qty: 90 TABLET | Refills: 2 | Status: SHIPPED | OUTPATIENT
Start: 2024-04-24

## 2024-04-24 NOTE — TELEPHONE ENCOUNTER
Refill Decision Note   Evy Reavesasson  is requesting a refill authorization.  Brief Assessment and Rationale for Refill:  Approve     Medication Therapy Plan:  LOV 12/7/23      Comments:     Note composed:6:17 PM 04/24/2024

## 2024-07-18 ENCOUNTER — PROCEDURE VISIT (OUTPATIENT)
Dept: NEUROLOGY | Facility: CLINIC | Age: 79
End: 2024-07-18
Payer: MEDICARE

## 2024-07-18 DIAGNOSIS — G24.3 CERVICAL DYSTONIA: Primary | ICD-10-CM

## 2024-07-18 NOTE — PROCEDURES
BOTOX PROCEDURE NOTE     Date of Procedure:7/18/2024    Reason for Procedure: Cervical dystonia       Informed consent was obtained prior to performing this study. Two patient identifiers were confirmed with the patient prior to performing this study. A time out to determine correct patient and and agreement on procedure performed was conducted prior to the injections.     Procedure Details: After informed consent obtained the patient's head and upper neck was cleansed with alcohol rub and 300 Units of Botox (diluted 1:1) was injected in the following bilateral muscles:   50 units in each Trapezius*  40 units in each Levator Scapulae*  20 units in each Splenius Capitus*  20 units in each Longissimus*  10 units in each Spenius Cervices*  10 units in each Semispinalis Capitus*    *= denotes bilateral injections    The patient tolerated the procedure well with less than 0.25cc of blood loss. She was observed for several minutes post injection and given a handout from [employer] regarding when and where to seek help if side effects are experienced.    RTC in 6 months for more Botox, but after 12 weeks if needed at any point  Botox  continues to greatly help her symptoms of neck pain and dystonia and she is doing well with twice yearly injections  She sees pain management for lumbar injection PRN-  she also uses meds  PRN with PCP instead  and had updated MRI L spine as requested by Dr Dejesus which was largely unchanged and he did not recommend surgery in 2018  -Suggested she see pain management back as needed noting she  SHIRLENE failed prior/ also  being treated with PCP  Trigeminal neuralgia pain improved as documented prior and PCP follows this and labs  MRI C spine 1/2018 showed mild spinal stenosis per report  Cautioned her re: Reglan Rx per PCP and the potential for worsening dystonia and other movement side effects. She has discontinued use since  Note she is being evaluated for dysphagia by other providers. She does  not notice this worsening with Botox. Thought to be due to a large hiatal hernia plus her neck disorder/ forward flexion. Currently, she feels great benefit with Botox and swallowing is improved.

## 2024-08-07 DIAGNOSIS — M81.0 AGE-RELATED OSTEOPOROSIS WITHOUT CURRENT PATHOLOGICAL FRACTURE: Primary | ICD-10-CM

## 2024-08-12 ENCOUNTER — INFUSION (OUTPATIENT)
Dept: INFUSION THERAPY | Facility: HOSPITAL | Age: 79
End: 2024-08-12
Attending: OBSTETRICS & GYNECOLOGY
Payer: MEDICARE

## 2024-08-12 VITALS
RESPIRATION RATE: 20 BRPM | HEIGHT: 64 IN | DIASTOLIC BLOOD PRESSURE: 92 MMHG | SYSTOLIC BLOOD PRESSURE: 140 MMHG | HEART RATE: 72 BPM | BODY MASS INDEX: 32.26 KG/M2 | WEIGHT: 188.94 LBS

## 2024-08-12 DIAGNOSIS — M81.0 AGE-RELATED OSTEOPOROSIS WITHOUT CURRENT PATHOLOGICAL FRACTURE: Primary | ICD-10-CM

## 2024-08-12 PROCEDURE — 63600175 PHARM REV CODE 636 W HCPCS: Mod: JZ,TB | Performed by: OBSTETRICS & GYNECOLOGY

## 2024-08-12 PROCEDURE — 96372 THER/PROPH/DIAG INJ SC/IM: CPT

## 2024-08-12 RX ADMIN — DENOSUMAB 60 MG: 60 INJECTION SUBCUTANEOUS at 01:08

## 2024-08-14 ENCOUNTER — TELEPHONE (OUTPATIENT)
Dept: OBSTETRICS AND GYNECOLOGY | Facility: CLINIC | Age: 79
End: 2024-08-14

## 2024-08-14 ENCOUNTER — OFFICE VISIT (OUTPATIENT)
Dept: OBSTETRICS AND GYNECOLOGY | Facility: CLINIC | Age: 79
End: 2024-08-14
Payer: MEDICARE

## 2024-08-14 VITALS
WEIGHT: 187.81 LBS | RESPIRATION RATE: 14 BRPM | BODY MASS INDEX: 32.06 KG/M2 | HEART RATE: 74 BPM | OXYGEN SATURATION: 96 % | HEIGHT: 64 IN | SYSTOLIC BLOOD PRESSURE: 118 MMHG | DIASTOLIC BLOOD PRESSURE: 68 MMHG

## 2024-08-14 DIAGNOSIS — N30.01 ACUTE CYSTITIS WITH HEMATURIA: ICD-10-CM

## 2024-08-14 DIAGNOSIS — R10.2 PELVIC PAIN: Primary | ICD-10-CM

## 2024-08-14 LAB
BILIRUB SERPL-MCNC: NEGATIVE MG/DL
BLOOD URINE, POC: NORMAL
CLARITY, POC UA: NORMAL
COLOR, POC UA: YELLOW
GLUCOSE UR QL STRIP: NEGATIVE
KETONES UR QL STRIP: NEGATIVE
LEUKOCYTE ESTERASE URINE, POC: NORMAL
NITRITE, POC UA: NEGATIVE
PH, POC UA: 5.5
PROTEIN, POC: NEGATIVE
SPECIFIC GRAVITY, POC UA: 1.02
UROBILINOGEN, POC UA: NEGATIVE

## 2024-08-14 PROCEDURE — 3074F SYST BP LT 130 MM HG: CPT | Mod: CPTII,S$GLB,, | Performed by: OBSTETRICS & GYNECOLOGY

## 2024-08-14 PROCEDURE — 81002 URINALYSIS NONAUTO W/O SCOPE: CPT | Mod: S$GLB,,, | Performed by: OBSTETRICS & GYNECOLOGY

## 2024-08-14 PROCEDURE — 87086 URINE CULTURE/COLONY COUNT: CPT | Performed by: OBSTETRICS & GYNECOLOGY

## 2024-08-14 PROCEDURE — 1125F AMNT PAIN NOTED PAIN PRSNT: CPT | Mod: CPTII,S$GLB,, | Performed by: OBSTETRICS & GYNECOLOGY

## 2024-08-14 PROCEDURE — 1159F MED LIST DOCD IN RCRD: CPT | Mod: CPTII,S$GLB,, | Performed by: OBSTETRICS & GYNECOLOGY

## 2024-08-14 PROCEDURE — 3078F DIAST BP <80 MM HG: CPT | Mod: CPTII,S$GLB,, | Performed by: OBSTETRICS & GYNECOLOGY

## 2024-08-14 PROCEDURE — 99213 OFFICE O/P EST LOW 20 MIN: CPT | Mod: S$GLB,,, | Performed by: OBSTETRICS & GYNECOLOGY

## 2024-08-14 PROCEDURE — 99999 PR PBB SHADOW E&M-EST. PATIENT-LVL IV: CPT | Mod: PBBFAC,,, | Performed by: OBSTETRICS & GYNECOLOGY

## 2024-08-14 RX ORDER — NITROFURANTOIN 25; 75 MG/1; MG/1
100 CAPSULE ORAL 2 TIMES DAILY
Qty: 14 CAPSULE | Refills: 0 | Status: SHIPPED | OUTPATIENT
Start: 2024-08-14 | End: 2024-08-21

## 2024-08-14 NOTE — PROGRESS NOTES
Subjective:    Patient ID: Evy Mendoza is a 79 y.o. y.o. female.     Chief Complaint:   Chief Complaint   Patient presents with    Pelvic Pain       History of Present Illness:  Evy presents today complaining of 4 days history of lower back pain and lower pelvic pressure. She reports yesterday she was having sharp pains with movement. She does have severe constipation; she did have a bowel movement on Sunday and the pain improved some but returned.  She has some discomfort in her bladder.        ROS:   Review of Systems   Constitutional:  Negative for activity change, appetite change, chills, diaphoresis, fatigue, fever and unexpected weight change.   HENT:  Negative for mouth sores and tinnitus.    Eyes:  Negative for discharge and visual disturbance.   Respiratory:  Negative for cough, shortness of breath and wheezing.    Cardiovascular:  Negative for chest pain, palpitations and leg swelling.   Gastrointestinal:  Positive for abdominal pain and constipation. Negative for bloating, blood in stool, diarrhea, nausea and vomiting.   Endocrine: Negative for diabetes, hair loss, hot flashes, hyperthyroidism and hypothyroidism.   Genitourinary:  Positive for pelvic pain. Negative for dysuria, flank pain, frequency, genital sores, hematuria, urgency, vaginal bleeding, vaginal discharge, vaginal pain, postcoital bleeding and vaginal odor.   Musculoskeletal:  Negative for back pain, joint swelling and myalgias.   Integumentary:  Negative for rash, acne, breast mass, nipple discharge and breast skin changes.   Neurological:  Negative for seizures, syncope, numbness and headaches.   Hematological:  Negative for adenopathy. Does not bruise/bleed easily.   Psychiatric/Behavioral:  Negative for depression and sleep disturbance. The patient is not nervous/anxious.    Breast: Negative for mass, mastodynia, nipple discharge and skin changes          Objective:    Vital Signs:  Vitals:    08/14/24 1552   BP: 118/68    Pulse: 74   Resp: 14       Physical Exam:  General:  alert, cooperative, no distress   Head Normocephalic, without obvious abnormality, atraumatic   Neck .supple, symmetrical, trachea midline   Skin:  Skin color, texture, turgor normal. No rashes or lesions   Abdomen:  soft, non-tender; bowel sounds normal   Pelvis: External genitalia: normal general appearance  Urinary system: bladder tender to palpation  Vaginal: normal mucosa without prolapse or lesions Pessary removed  Cervix: normal appearance  Uterus: normal size, shape, position  Adnexa: non palpable   Extremities extremities normal, atraumatic, no cyanosis or edema   Neurologic Grossly normal          Problem List Items Addressed This Visit    None  Visit Diagnoses       Pelvic pain    -  Primary    Relevant Orders    Urine culture    POCT urine dipstick without microscope (Completed)    Acute cystitis with hematuria        Relevant Medications    nitrofurantoin, macrocrystal-monohydrate, (MACROBID) 100 MG capsule

## 2024-08-14 NOTE — TELEPHONE ENCOUNTER
----- Message from Marielena Dg sent at 8/14/2024  8:12 AM CDT -----  Contact: Self  Evy Mendoza  MRN: [id number]  Home Phone      [phone]  Work Phone      Not on file.  Mobile          [phone]    Patient Care Team:  Rakesh Devine MD as PCP - General (Internal Medicine)  Marisa Mcdowell MD (Obstetrics and Gynecology)  Caleb Lowe MD as Consulting Physician (Gastroenterology)  Shree Thapa MD as Consulting Physician (Interventional Cardiology)  OB? No  What phone number can you be reached at? [phone]  Message: pt states she is having pain in her lower abdominal area going to the back. States it sometimes shocks her. Pt also states she has a pessary and doesn't know if that may have something to do it with it, or if it may be an infection
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
## 2024-08-16 LAB
BACTERIA UR CULT: NORMAL
BACTERIA UR CULT: NORMAL

## 2024-08-28 ENCOUNTER — OFFICE VISIT (OUTPATIENT)
Dept: UROLOGY | Facility: CLINIC | Age: 79
End: 2024-08-28
Attending: SPECIALIST
Payer: MEDICARE

## 2024-08-28 VITALS
HEART RATE: 73 BPM | WEIGHT: 185.88 LBS | HEIGHT: 64 IN | SYSTOLIC BLOOD PRESSURE: 116 MMHG | DIASTOLIC BLOOD PRESSURE: 68 MMHG | BODY MASS INDEX: 31.73 KG/M2 | OXYGEN SATURATION: 96 %

## 2024-08-28 DIAGNOSIS — N30.10 INTERSTITIAL CYSTITIS: Primary | ICD-10-CM

## 2024-08-28 PROCEDURE — 99999 PR PBB SHADOW E&M-EST. PATIENT-LVL V: CPT | Mod: PBBFAC,,, | Performed by: SPECIALIST

## 2024-08-28 PROCEDURE — 1159F MED LIST DOCD IN RCRD: CPT | Mod: CPTII,S$GLB,, | Performed by: SPECIALIST

## 2024-08-28 PROCEDURE — 1160F RVW MEDS BY RX/DR IN RCRD: CPT | Mod: CPTII,S$GLB,, | Performed by: SPECIALIST

## 2024-08-28 PROCEDURE — 99204 OFFICE O/P NEW MOD 45 MIN: CPT | Mod: S$GLB,,, | Performed by: SPECIALIST

## 2024-08-28 PROCEDURE — 3074F SYST BP LT 130 MM HG: CPT | Mod: CPTII,S$GLB,, | Performed by: SPECIALIST

## 2024-08-28 PROCEDURE — 1126F AMNT PAIN NOTED NONE PRSNT: CPT | Mod: CPTII,S$GLB,, | Performed by: SPECIALIST

## 2024-08-28 PROCEDURE — 3288F FALL RISK ASSESSMENT DOCD: CPT | Mod: CPTII,S$GLB,, | Performed by: SPECIALIST

## 2024-08-28 PROCEDURE — 3078F DIAST BP <80 MM HG: CPT | Mod: CPTII,S$GLB,, | Performed by: SPECIALIST

## 2024-08-28 PROCEDURE — 1101F PT FALLS ASSESS-DOCD LE1/YR: CPT | Mod: CPTII,S$GLB,, | Performed by: SPECIALIST

## 2024-08-28 RX ORDER — HYDROXYZINE HYDROCHLORIDE 25 MG/1
25 TABLET, FILM COATED ORAL NIGHTLY
Qty: 30 TABLET | Refills: 5 | Status: SHIPPED | OUTPATIENT
Start: 2024-08-28

## 2024-08-28 NOTE — PROGRESS NOTES
[facility] - Urology   Clinic Note    SUBJECTIVE:     Chief Complaint   Patient presents with    Consult     States she used to see Dr. Godfrey and Dr. Lay but due to her insurance she had to see another Urologist. States she is doing okay now with her IC, has been taking Elmiron, Azo and drinking lots of water.        Referral from: Self, Trey.    History of Present Illness:  Evy Mendoza is a 79 y.o. female who presents to clinic for interstitial cystitis.    Very pleasant and youthful 79-year-old female with a long history of interstitial cystitis.  She has been followed by the urology group and homeless for many years however they no longer accept her insurance.  I am happy to provide care for here in [place].  She has done reasonably well with Elmiron.  She has been well informed of the risk of maculopathy and permanent retinal damage.  Fortunately she has regular visits with her ophthalmologist.  In addition to Elmiron, she has done well with intravesical therapy with DMSO.  She is presently asymptomatic.    Patient endorses no additional complaints at this time.    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 Ochsner Jr., MD;  Location: [facility];  Service: Orthopedics;  Laterality: Left;    TUBAL LIGATION         Family History   Problem Relation Name Age of Onset    Diabetes Maternal Grandfather      Prostate cancer Father      Diabetes  Mother      Lung cancer Mother      Diabetes Brother      Breast cancer Neg Hx      Ovarian cancer Neg Hx      Colon cancer Neg Hx         Social History     Tobacco Use    Smoking status: Never    Smokeless tobacco: Never   Substance Use Topics    Alcohol use: No    Drug use: No       Current Outpatient Medications on File Prior to Visit   Medication Sig Dispense Refill    acetaminophen (TYLENOL) 325 MG tablet Take 325 mg by mouth every 6 (six) hours as needed for Pain.      albuterol (PROVENTIL) 2.5 mg /3 mL (0.083 %) nebulizer solution 3 mLs.      ascorbic acid (VITAMIN C ORAL) Take 1 tablet by mouth once daily.      calcium-vitamin D3 (OS-INDIA 500 + D3) 500 mg(1,250mg) -200 unit per tablet Take 1 tablet by mouth once daily at 6am.       carBAMazepine (TEGRETOL) 200 mg tablet Take 1 tablet twice a day by oral route.      carboxymethylcellulose sodium 0.25 % Drop Place 1 drop into both eyes as needed.      cetirizine (ZYRTEC) 10 MG tablet Take 5 mg by mouth as needed for Allergies.       cinnamon bark (CINNAMON ORAL) Take by mouth.      denosumab (PROLIA) 60 mg/mL Syrg Inject 60 mg into the skin every 6 (six) months.      diclofenac sodium (VOLTAREN ARTHRITIS PAIN) 1 % Gel Apply 2 g topically once daily.      ezetimibe (ZETIA) 10 mg tablet TAKE 1 TABLET (10 MG TOTAL) BY MOUTH ONCE DAILY. 90 tablet 3    famotidine (PEPCID) 40 MG tablet Take 40 mg by mouth.      ferrous sulfate (FEOSOL) 325 mg (65 mg iron) Tab tablet Take 325 mg by mouth. Mon Wed Fri      fish oil-omega-3 fatty acids 300-1,000 mg capsule Take 1 g by mouth once daily.       fluticasone propionate (FLONASE) 50 mcg/actuation nasal spray 1 spray by Nasal route as needed for Allergies.       glucos sul 2KCl/msm/chond/C/Mn (GLUCOSAMINE CHONDROITIN ORAL) Take by mouth.      lactulose (CHRONULAC) 10 gram/15 mL solution Take 1 tablet by mouth once a week.      losartan-hydrochlorothiazide 50-12.5 mg (HYZAAR) 50-12.5 mg per tablet TAKE 1 TABLET EVERY DAY 90  tablet 3    metoprolol succinate (TOPROL-XL) 50 MG 24 hr tablet TAKE 1/2 TABLET EVERY DAY 45 tablet 3    multivitamin (THERAGRAN) per tablet Take 1 tablet by mouth once daily.      nitrofurantoin, macrocrystal-monohydrate, (MACROBID) 100 MG capsule Take 1 capsule (100 mg total) by mouth 2 (two) times daily. for 7 days 14 capsule 0    pentosan polysulfate (ELMIRON) 100 mg Cap Take 1 capsule (100 mg total) by mouth 3 (three) times daily. (Patient taking differently: Take 100 mg by mouth 2 (two) times a day.) 90 capsule 3    PREMPRO 0.3-1.5 mg per tablet Take 1 tablet by mouth once daily. 90 tablet 2    RABEprazole (ACIPHEX) 20 mg tablet TAKE 1 TABLET EVERY DAY 90 tablet 3    simvastatin (ZOCOR) 20 MG tablet TAKE 1 TABLET (20 MG TOTAL) BY MOUTH EVERY EVENING. 90 tablet 3    traMADoL (ULTRAM) 50 mg tablet Take 1 tablet (50 mg total) by mouth 2 (two) times daily. 60 tablet 0    turmeric root extract 500 mg Cap Take by mouth.      UNABLE TO FIND medication name: Herbevision      vitamin B complex (B COMPLEX-VITAMIN B12 ORAL) Take 1 tablet by mouth once daily.       Current Facility-Administered Medications on File Prior to Visit   Medication Dose Route Frequency Provider Last Rate Last Admin    denosumab (PROLIA) injection 60 mg  60 mg Subcutaneous 1 time in Clinic/HOD Marisa Mcdowell MD        denosumab (PROLIA) injection 60 mg  60 mg Subcutaneous 1 time in Clinic/HOD Marisa Mcdowell MD           Review of patient's allergies indicates:   Allergen Reactions    Coffee Other (See Comments)     congestion    Cottonseed Other (See Comments)     congestion    Cely Other (See Comments)     congestion    Lettuce Other (See Comments)     congestion    Onion Other (See Comments)     congestion       Review of Systems   All other systems reviewed and are negative.       Review of Systems:  A review of 10+ systems was conducted with pertinent positive and negative findings documented in HPI with all other systems  "reviewed and negative.    OBJECTIVE:     Estimated body mass index is 31.9 kg/m² as calculated from the following:    Height as of this encounter: 5' 4" (1.626 m).    Weight as of this encounter: 84.3 kg (185 lb 13.6 oz).    Vital Signs (Most Recent)  Vitals:    08/28/24 0943   BP: 116/68   Pulse: 73       Physical Exam:    Physical Exam     GENERAL: patient sitting comfortably  HEENT: normocephalic  NECK: supple, no JVD  PULM: normal chest rise, no increased WOB  HEART: non-diaphoretic  ABDO: soft, nondistended, nontender  BACK: no CVA tenderness bilaterally  SKIN: warm, dry, well perfused  EXT: no bruising or edema  NEURO: grossly normal with no focal deficits  PSYCH: appropriate mood and affect    Genitourinary Exam:  deferred      LABS:     Lab Results   Component Value Date    BUN 28 (H) 04/06/2023    CREATININE 1.00 04/06/2023    WBC 5.60 10/24/2023    HGB 12.1 10/24/2023    HCT 35.7 (L) 10/24/2023     10/24/2023    AST 32 04/06/2023    ALT 19 04/06/2023    ALKPHOS 99 04/06/2023    ALBUMIN 4.3 04/06/2023    INR 1.0 11/22/2019        Urinalysis:   No results found for: "UAREFLEX"       Imaging:  I have personally reviewed all relevant imaging studies.    No results found for this or any previous visit (from the past 2160 hour(s)).  No results found for this or any previous visit (from the past 2160 hour(s)).  MRI Hip Without Contrast Left  Narrative: EXAMINATION:  MRI HIP WITHOUT CONTRAST LEFT    CLINICAL HISTORY:  Pain in unspecified hip    TECHNIQUE:  Multisequence, multiplanar MR images of the left hip without contrast.    COMPARISON:  Radiographs 01/02/2024    FINDINGS:  Left hip MRI:    Bones/joints:    Normal bone marrow signal.  No fracture, osteonecrosis, stress response, or aggressive marrow infiltrative process.    Mild degenerate arthritis of the left hip.  No significant joint effusion.    Degenerative tear of the anterior superior labrum.    Soft tissues:    High-grade partial-thickness " tearing of the distal gluteus minimus tendon on a background of moderate tendinosis.  Mild distal gluteus medius tendinosis, without tear.  Mild reactive edema in the greater trochanteric bursa.    Complete tear at the hamstring origin with 7 mm retraction.  These findings are on a background of mild tendinosis.    Moderate gluteus minimus atrophy.  Mild tensor fascia nicki atrophy.  Otherwise, normal muscle signal and volume.    Sciatic nerve and femoral neurovascular bundle are unremarkable.    Other:    Right hip is evaluated only in large field-of-view.  Complete tear at the hamstring origin with up to 16 mm retraction on a background of mild tendinosis.  Complete or near complete tear of the distal gluteus minimus tendon and reactive edema in the greater trochanteric bursa.  Moderate gluteus minimus and mild right tensor fascia nicki atrophy.    Moderate atrophy of the lower lumbar paraspinal musculature.    Degenerative changes of the lumbar spine, incompletely evaluated.    Moderate bilateral SI joint osteoarthritis.    Colonic diverticulosis.    Vaginal pessary.  Impression: Left hip MRI:    1. Mild left hip osteoarthritis.  Degenerative tear of the anterior superior labrum.  2. High-grade partial-thickness tearing of the distal gluteus minimus tendon on a background of moderate tendinosis. Mild distal gluteus medius tendinosis, without tear.  Mild reactive edema in the greater trochanteric bursa.  3. Complete tear at the hamstring origin with 7 mm retraction. These findings are on a background of mild tendinosis.  Additional findings, as above.    Electronically signed by: Everardo Waldrop  Date:    03/14/2024  Time:    18:14         ASSESSMENT     1. Interstitial cystitis        PLAN:     Presently asymptomatic.  I agreed that the benefits outweigh the risks with continued Elmiron.  Patient will certainly give our clinic a call if she develops a flare in her symptoms.  We will look into carrying DMSO for  the clinic should she need continued intravesical therapy.    Virgilio Cruz MD  Urology  [facility]     Disclaimer: This note has been generated using voice-recognition software. There may be typographical errors that have been missed during proof-reading.

## 2024-10-16 ENCOUNTER — TELEPHONE (OUTPATIENT)
Dept: OBSTETRICS AND GYNECOLOGY | Facility: CLINIC | Age: 79
End: 2024-10-16
Payer: MEDICARE

## 2024-10-16 DIAGNOSIS — Z12.31 BREAST CANCER SCREENING BY MAMMOGRAM: Primary | ICD-10-CM

## 2024-10-16 NOTE — TELEPHONE ENCOUNTER
----- Message from Med Assistant Monsivais sent at 10/16/2024  8:38 AM CDT -----  Contact: self  Evy Mendoza  MRN: [id number]  Home Phone      [phone]  Work Phone      Not on file.  Mobile          [phone]    Patient Care Team:  Rakesh Devine MD as PCP - General (Internal Medicine)  Marisa Mcdowell MD (Obstetrics and Gynecology)  Caleb Lowe MD as Consulting Physician (Gastroenterology)  Shree Thapa MD as Consulting Physician (Interventional Cardiology)  OB? No  What phone number can you be reached at? [phone]  Message: Please link mammo orders to appt 12/26/24.

## 2024-10-16 NOTE — TELEPHONE ENCOUNTER
----- Message from Med Assistant Monsivais sent at 10/16/2024  8:34 AM CDT -----  Contact: self  Evy Mendoza  MRN: [id number]  Home Phone      [phone]  Work Phone      Not on file.  Mobile          [phone]    Patient Care Team:  Rakesh Devine MD as PCP - General (Internal Medicine)  Marisa Mcdowell MD (Obstetrics and Gynecology)  Caleb Lowe MD as Consulting Physician (Gastroenterology)  Shree Thapa MD as Consulting Physician (Interventional Cardiology)  OB? No  What phone number can you be reached at? [phone]  Message: Needs to make appt for irrg bleeding.

## 2024-10-17 ENCOUNTER — OFFICE VISIT (OUTPATIENT)
Dept: OBSTETRICS AND GYNECOLOGY | Facility: CLINIC | Age: 79
End: 2024-10-17
Payer: MEDICARE

## 2024-10-17 VITALS
DIASTOLIC BLOOD PRESSURE: 64 MMHG | HEIGHT: 64 IN | WEIGHT: 185.94 LBS | SYSTOLIC BLOOD PRESSURE: 112 MMHG | BODY MASS INDEX: 31.74 KG/M2 | HEART RATE: 76 BPM

## 2024-10-17 DIAGNOSIS — N93.9 VAGINAL BLEEDING: ICD-10-CM

## 2024-10-17 DIAGNOSIS — Z46.89 PESSARY MAINTENANCE: Primary | ICD-10-CM

## 2024-10-17 PROCEDURE — 1159F MED LIST DOCD IN RCRD: CPT | Mod: CPTII,S$GLB,, | Performed by: OBSTETRICS & GYNECOLOGY

## 2024-10-17 PROCEDURE — 1101F PT FALLS ASSESS-DOCD LE1/YR: CPT | Mod: CPTII,S$GLB,, | Performed by: OBSTETRICS & GYNECOLOGY

## 2024-10-17 PROCEDURE — 3074F SYST BP LT 130 MM HG: CPT | Mod: CPTII,S$GLB,, | Performed by: OBSTETRICS & GYNECOLOGY

## 2024-10-17 PROCEDURE — 3078F DIAST BP <80 MM HG: CPT | Mod: CPTII,S$GLB,, | Performed by: OBSTETRICS & GYNECOLOGY

## 2024-10-17 PROCEDURE — 3288F FALL RISK ASSESSMENT DOCD: CPT | Mod: CPTII,S$GLB,, | Performed by: OBSTETRICS & GYNECOLOGY

## 2024-10-17 PROCEDURE — 99213 OFFICE O/P EST LOW 20 MIN: CPT | Mod: S$GLB,,, | Performed by: OBSTETRICS & GYNECOLOGY

## 2024-10-17 PROCEDURE — 99999 PR PBB SHADOW E&M-EST. PATIENT-LVL IV: CPT | Mod: PBBFAC,,, | Performed by: OBSTETRICS & GYNECOLOGY

## 2024-10-17 RX ORDER — ESTRADIOL 0.1 MG/G
1 CREAM VAGINAL
COMMUNITY

## 2024-10-17 NOTE — PROGRESS NOTES
Subjective:    Patient ID: Evy Mendoza is a 79 y.o. y.o. female.     Chief Complaint:   Chief Complaint   Patient presents with    Vaginal Bleeding     Irregular bleeding       History of Present Illness:  Evy presents today complaining of some irregular bleeding/spotting. She has a pessary in place that she is able to remove and clear independently. She reports she recently starting using estrace cream from the urologist and was concerned this could have been causing the bleeding.       ROS:   Review of Systems   Constitutional:  Negative for activity change, appetite change, chills, diaphoresis, fatigue, fever and unexpected weight change.   HENT:  Negative for mouth sores and tinnitus.    Eyes:  Negative for discharge and visual disturbance.   Respiratory:  Negative for cough, shortness of breath and wheezing.    Cardiovascular:  Negative for chest pain, palpitations and leg swelling.   Gastrointestinal:  Negative for abdominal pain, bloating, blood in stool, constipation, diarrhea, nausea and vomiting.   Endocrine: Negative for diabetes, hair loss, hot flashes, hyperthyroidism and hypothyroidism.   Genitourinary:  Positive for vaginal bleeding. Negative for dysuria, flank pain, frequency, genital sores, hematuria, urgency, vaginal discharge, vaginal pain, postcoital bleeding and vaginal odor.   Musculoskeletal:  Negative for back pain, joint swelling and myalgias.   Integumentary:  Negative for rash, acne, breast mass, nipple discharge and breast skin changes.   Neurological:  Negative for seizures, syncope, numbness and headaches.   Hematological:  Negative for adenopathy. Does not bruise/bleed easily.   Psychiatric/Behavioral:  Negative for depression and sleep disturbance. The patient is not nervous/anxious.    Breast: Negative for mass, mastodynia, nipple discharge and skin changes          Objective:    Vital Signs:  Vitals:    10/17/24 1523   BP: 112/64   Pulse: 76       Physical  Exam:  General:  alert, cooperative, no distress   Head Normocephalic, without obvious abnormality, atraumatic   Neck .supple, symmetrical, trachea midline   Skin:  Skin color, texture, turgor normal. No rashes or lesions   Abdomen:  soft, non-tender; bowel sounds normal   Pelvis: External genitalia: normal general appearance  Urinary system: urethral meatus normal, bladder nontender  Vaginal: atrophic mucosa, erosion noted, no granulation tissue; no active bleeding  Cervix: normal appearance   Extremities extremities normal, atraumatic, no cyanosis or edema   Neurologic Grossly normal          Problem List Items Addressed This Visit    None  Visit Diagnoses       Pessary maintenance    -  Primary    Vaginal bleeding              Continue estrace cream twice weekly  Continue pessary  Monitor bleeding

## 2024-12-10 ENCOUNTER — OFFICE VISIT (OUTPATIENT)
Dept: UROLOGY | Facility: CLINIC | Age: 79
End: 2024-12-10
Attending: SPECIALIST
Payer: MEDICARE

## 2024-12-10 VITALS
DIASTOLIC BLOOD PRESSURE: 88 MMHG | WEIGHT: 186.75 LBS | HEART RATE: 70 BPM | BODY MASS INDEX: 31.88 KG/M2 | SYSTOLIC BLOOD PRESSURE: 120 MMHG | HEIGHT: 64 IN | OXYGEN SATURATION: 98 %

## 2024-12-10 DIAGNOSIS — N30.10 INTERSTITIAL CYSTITIS: Primary | ICD-10-CM

## 2024-12-10 PROCEDURE — 1101F PT FALLS ASSESS-DOCD LE1/YR: CPT | Mod: CPTII,S$GLB,, | Performed by: SPECIALIST

## 2024-12-10 PROCEDURE — 3079F DIAST BP 80-89 MM HG: CPT | Mod: CPTII,S$GLB,, | Performed by: SPECIALIST

## 2024-12-10 PROCEDURE — 99214 OFFICE O/P EST MOD 30 MIN: CPT | Mod: S$GLB,,, | Performed by: SPECIALIST

## 2024-12-10 PROCEDURE — 1126F AMNT PAIN NOTED NONE PRSNT: CPT | Mod: CPTII,S$GLB,, | Performed by: SPECIALIST

## 2024-12-10 PROCEDURE — 3288F FALL RISK ASSESSMENT DOCD: CPT | Mod: CPTII,S$GLB,, | Performed by: SPECIALIST

## 2024-12-10 PROCEDURE — 3074F SYST BP LT 130 MM HG: CPT | Mod: CPTII,S$GLB,, | Performed by: SPECIALIST

## 2024-12-10 PROCEDURE — 1160F RVW MEDS BY RX/DR IN RCRD: CPT | Mod: CPTII,S$GLB,, | Performed by: SPECIALIST

## 2024-12-10 PROCEDURE — 1159F MED LIST DOCD IN RCRD: CPT | Mod: CPTII,S$GLB,, | Performed by: SPECIALIST

## 2024-12-10 PROCEDURE — 99999 PR PBB SHADOW E&M-EST. PATIENT-LVL II: CPT | Mod: PBBFAC,,, | Performed by: SPECIALIST

## 2024-12-10 RX ORDER — HYDROXYZINE HYDROCHLORIDE 25 MG/1
25 TABLET, FILM COATED ORAL 3 TIMES DAILY PRN
Qty: 30 TABLET | Refills: 11 | Status: SHIPPED | OUTPATIENT
Start: 2024-12-10

## 2024-12-10 NOTE — PROGRESS NOTES
[facility] - Urology   Clinic Note    SUBJECTIVE:     Chief Complaint   Patient presents with    Follow-up     States she has been doing good, didn't want to cancel due to trying to keep up with a urologist.       Referral from: No ref. provider found.    History of Present Illness:  Evy Mendoza is a 79 y.o. female who presents to clinic for IC.    Doing well with Elmiron BID.  Discussed risk of maculopathy.  Recommend elavil and atarax.  Has done well with DMSO in the past, but will refrain from proceeding at the moment.    Past Medical History:   Diagnosis Date    Arthritis     Cataract     Fibrocystic breast     GERD (gastroesophageal reflux disease)     Hyperlipidemia     Hypertension     Interstitial cystitis     Osteoporosis     Sleep apnea        Current Outpatient Medications on File Prior to Visit   Medication Sig Dispense Refill    acetaminophen (TYLENOL) 325 MG tablet Take 325 mg by mouth every 6 (six) hours as needed for Pain.      albuterol (PROVENTIL) 2.5 mg /3 mL (0.083 %) nebulizer solution 3 mLs.      calcium-vitamin D3 (OS-INDIA 500 + D3) 500 mg(1,250mg) -200 unit per tablet Take 1 tablet by mouth once daily at 6am.       carBAMazepine (TEGRETOL) 200 mg tablet Take 1 tablet twice a day by oral route.      cetirizine (ZYRTEC) 10 MG tablet Take 5 mg by mouth as needed for Allergies.       cinnamon bark (CINNAMON ORAL) Take by mouth.      denosumab (PROLIA) 60 mg/mL Syrg Inject 60 mg into the skin every 6 (six) months.      estradioL (ESTRACE) 0.01 % (0.1 mg/gram) vaginal cream Place 1 g vaginally twice a week.      ezetimibe (ZETIA) 10 mg tablet TAKE 1 TABLET EVERY DAY 90 tablet 3    ferrous sulfate (FEOSOL) 325 mg (65 mg iron) Tab tablet Take 325 mg by mouth. Mon Wed Fri      fish oil-omega-3 fatty acids 300-1,000 mg capsule Take 1 g by mouth once daily.       fluticasone propionate (FLONASE) 50 mcg/actuation nasal spray 1 spray by Nasal route as needed for Allergies.       glucos  "sul 2KCl/msm/chond/C/Mn (GLUCOSAMINE CHONDROITIN ORAL) Take by mouth.      lactulose (CHRONULAC) 10 gram/15 mL solution Take 1 tablet by mouth once a week.      losartan-hydrochlorothiazide 50-12.5 mg (HYZAAR) 50-12.5 mg per tablet TAKE 1 TABLET EVERY DAY 90 tablet 3    metoprolol succinate (TOPROL-XL) 50 MG 24 hr tablet TAKE 1/2 TABLET EVERY DAY 45 tablet 3    multivitamin (THERAGRAN) per tablet Take 1 tablet by mouth once daily.      pentosan polysulfate (ELMIRON) 100 mg Cap Take 1 capsule (100 mg total) by mouth 3 (three) times daily. (Patient taking differently: Take 100 mg by mouth 2 (two) times a day.) 90 capsule 3    PREMPRO 0.3-1.5 mg per tablet Take 1 tablet by mouth once daily. 90 tablet 2    RABEprazole (ACIPHEX) 20 mg tablet TAKE 1 TABLET EVERY DAY 90 tablet 3    simvastatin (ZOCOR) 20 MG tablet TAKE 1 TABLET EVERY EVENING 90 tablet 3    traMADoL (ULTRAM) 50 mg tablet Take 1 tablet (50 mg total) by mouth 2 (two) times a day. 60 tablet 0    turmeric root extract 500 mg Cap Take by mouth.      UNABLE TO FIND medication name: Herbevision      vitamin B complex (B COMPLEX-VITAMIN B12 ORAL) Take 1 tablet by mouth once daily.      diclofenac sodium (VOLTAREN ARTHRITIS PAIN) 1 % Gel Apply 2 g topically once daily. (Patient not taking: Reported on 12/10/2024)       Current Facility-Administered Medications on File Prior to Visit   Medication Dose Route Frequency Provider Last Rate Last Admin    denosumab (PROLIA) injection 60 mg  60 mg Subcutaneous 1 time in Clinic/HOD Marisa Mcdowell MD        denosumab (PROLIA) injection 60 mg  60 mg Subcutaneous 1 time in Clinic/HOD Marisa Mcdowell MD             OBJECTIVE:     Estimated body mass index is 32.05 kg/m² as calculated from the following:    Height as of this encounter: 5' 4" (1.626 m).    Weight as of this encounter: 84.7 kg (186 lb 11.7 oz).    Vital Signs (Most Recent)  Vitals:    12/10/24 0901   BP: 120/88   Pulse: 70       Physical " "Exam:    Physical Exam     GENERAL: patient sitting comfortably  HEENT: normocephalic  NECK: supple, no JVD  PULM: normal chest rise, no increased WOB  HEART: non-diaphoretic  ABDO: soft, nondistended, nontender  BACK: no CVA tenderness bilaterally  SKIN: warm, dry, well perfused  EXT: no bruising or edema  NEURO: grossly normal with no focal deficits  PSYCH: appropriate mood and affect    Genitourinary Exam:  deferred      LABS:     Lab Results   Component Value Date    BUN 28 (H) 04/06/2023    CREATININE 1.00 04/06/2023    WBC 5.60 10/24/2023    HGB 12.1 10/24/2023    HCT 35.7 (L) 10/24/2023     10/24/2023    AST 32 04/06/2023    ALT 19 04/06/2023    ALKPHOS 99 04/06/2023    ALBUMIN 4.3 04/06/2023    INR 1.0 11/22/2019        Urinalysis:   No results found for: "UAREFLEX"       Imaging:  I have personally reviewed all relevant imaging studies.    No results found for this or any previous visit (from the past 2160 hours).  No results found for this or any previous visit (from the past 2160 hours).  MRI Hip Without Contrast Left  Narrative: EXAMINATION:  MRI HIP WITHOUT CONTRAST LEFT    CLINICAL HISTORY:  Pain in unspecified hip    TECHNIQUE:  Multisequence, multiplanar MR images of the left hip without contrast.    COMPARISON:  Radiographs 01/02/2024    FINDINGS:  Left hip MRI:    Bones/joints:    Normal bone marrow signal.  No fracture, osteonecrosis, stress response, or aggressive marrow infiltrative process.    Mild degenerate arthritis of the left hip.  No significant joint effusion.    Degenerative tear of the anterior superior labrum.    Soft tissues:    High-grade partial-thickness tearing of the distal gluteus minimus tendon on a background of moderate tendinosis.  Mild distal gluteus medius tendinosis, without tear.  Mild reactive edema in the greater trochanteric bursa.    Complete tear at the hamstring origin with 7 mm retraction.  These findings are on a background of mild tendinosis.    Moderate " gluteus minimus atrophy.  Mild tensor fascia nicki atrophy.  Otherwise, normal muscle signal and volume.    Sciatic nerve and femoral neurovascular bundle are unremarkable.    Other:    Right hip is evaluated only in large field-of-view.  Complete tear at the hamstring origin with up to 16 mm retraction on a background of mild tendinosis.  Complete or near complete tear of the distal gluteus minimus tendon and reactive edema in the greater trochanteric bursa.  Moderate gluteus minimus and mild right tensor fascia nicki atrophy.    Moderate atrophy of the lower lumbar paraspinal musculature.    Degenerative changes of the lumbar spine, incompletely evaluated.    Moderate bilateral SI joint osteoarthritis.    Colonic diverticulosis.    Vaginal pessary.  Impression: Left hip MRI:    1. Mild left hip osteoarthritis.  Degenerative tear of the anterior superior labrum.  2. High-grade partial-thickness tearing of the distal gluteus minimus tendon on a background of moderate tendinosis. Mild distal gluteus medius tendinosis, without tear.  Mild reactive edema in the greater trochanteric bursa.  3. Complete tear at the hamstring origin with 7 mm retraction. These findings are on a background of mild tendinosis.  Additional findings, as above.    Electronically signed by: Everardo Waldrop  Date:    03/14/2024  Time:    18:14         ASSESSMENT     No diagnosis found.    PLAN:     Marie Bravo  Discussed adding elavil however there's a risk of seizures with tramadol.    Recommend trial of atarax 12.5 mg p.o. qhs.  Consider restarting DMSO  RTC 3 months.    Virgilio Cruz MD  Urology  [facility]     Disclaimer: This note has been generated using voice-recognition software. There may be typographical errors that have been missed during proof-reading.

## 2024-12-26 ENCOUNTER — HOSPITAL ENCOUNTER (OUTPATIENT)
Dept: RADIOLOGY | Facility: HOSPITAL | Age: 79
Discharge: HOME | End: 2024-12-26
Attending: OBSTETRICS & GYNECOLOGY
Payer: MEDICARE

## 2024-12-26 ENCOUNTER — OFFICE VISIT (OUTPATIENT)
Dept: OBSTETRICS AND GYNECOLOGY | Facility: CLINIC | Age: 79
End: 2024-12-26
Payer: MEDICARE

## 2024-12-26 VITALS — HEIGHT: 64 IN | WEIGHT: 186 LBS | BODY MASS INDEX: 31.76 KG/M2

## 2024-12-26 VITALS
SYSTOLIC BLOOD PRESSURE: 112 MMHG | WEIGHT: 182.44 LBS | HEIGHT: 64 IN | BODY MASS INDEX: 31.15 KG/M2 | DIASTOLIC BLOOD PRESSURE: 70 MMHG | HEART RATE: 80 BPM

## 2024-12-26 DIAGNOSIS — Z01.419 WELL WOMAN EXAM WITH ROUTINE GYNECOLOGICAL EXAM: Primary | ICD-10-CM

## 2024-12-26 DIAGNOSIS — Z12.31 BREAST CANCER SCREENING BY MAMMOGRAM: ICD-10-CM

## 2024-12-26 DIAGNOSIS — Z79.890 HORMONE REPLACEMENT THERAPY (HRT): ICD-10-CM

## 2024-12-26 DIAGNOSIS — M85.80 OSTEOPENIA DETERMINED BY X-RAY: ICD-10-CM

## 2024-12-26 PROCEDURE — 99999 PR PBB SHADOW E&M-EST. PATIENT-LVL III: CPT | Mod: PBBFAC,,, | Performed by: OBSTETRICS & GYNECOLOGY

## 2024-12-26 PROCEDURE — 77067 SCR MAMMO BI INCL CAD: CPT | Mod: TC

## 2024-12-26 PROCEDURE — 77063 BREAST TOMOSYNTHESIS BI: CPT | Mod: 26,,, | Performed by: RADIOLOGY

## 2024-12-26 PROCEDURE — 77067 SCR MAMMO BI INCL CAD: CPT | Mod: 26,,, | Performed by: RADIOLOGY

## 2024-12-26 RX ORDER — CONJUGATED ESTROGENS AND MEDROXYPROGESTERONE ACETATE .3; 1.5 MG/1; MG/1
1 TABLET, SUGAR COATED ORAL DAILY
Qty: 90 TABLET | Refills: 3 | Status: SHIPPED | OUTPATIENT
Start: 2024-12-26

## 2024-12-26 RX ORDER — ESTRADIOL 0.1 MG/G
1 CREAM VAGINAL
Qty: 42.5 G | Refills: 3 | Status: SHIPPED | OUTPATIENT
Start: 2024-12-26

## 2024-12-26 NOTE — LETTER
2024    Evy GARDNER Chiasson  KAR[address]YN [address]  [facility] [address]             [facility]  [address] [place] [id number]  Phone: [phone] To whom it may concern:          Evy Mendoza DUSTIN 1945 had her yearly mammogram completed on 2024.      If you have any questions or concerns, please don't hesitate to call.    Sincerely,        Marisa Mcdowell MD

## 2024-12-26 NOTE — PROGRESS NOTES
Subjective:    Patient ID: Evy Mendoza is a 79 y.o. y.o. female.     Chief Complaint: Annual Well Woman Exam     History of Present Illness:  Evy presents today for Annual Well Woman exam. She describes her menses as  absent .She denies pelvic pain.  She denies breast tenderness, masses, nipple discharge. She denies difficulty with urination or bowel movements. She denies menopausal symptoms such as hotflashes, vaginal dryness, and night sweats. She denies bloating, early satiety, or weight changes. She is not sexually active. Contraception is by no method.    The following portions of the patient's history were reviewed and updated as appropriate: allergies, current medications, past family history, past medical history, past social history, past surgical history and problem list.      Menstrual History:   No LMP recorded. Patient is postmenopausal..     OB History          2    Para   2    Term   2            AB        Living   2         SAB        IAB        Ectopic        Multiple        Live Births   2                 The following portions of the patient's history were reviewed and updated as appropriate: allergies, current medications, past family history, past medical history, past social history, past surgical history, and problem list.        ROS:     Review of Systems   Constitutional:  Negative for activity change, appetite change, chills, diaphoresis, fatigue, fever and unexpected weight change.   HENT:  Negative for mouth sores and tinnitus.    Eyes:  Negative for discharge and visual disturbance.   Respiratory:  Negative for cough, shortness of breath and wheezing.    Cardiovascular:  Negative for chest pain, palpitations and leg swelling.   Gastrointestinal:  Negative for abdominal pain, bloating, blood in stool, constipation, diarrhea, nausea and vomiting.   Endocrine: Negative for diabetes, hair loss, hot flashes, hyperthyroidism and hypothyroidism.   Genitourinary:   "Negative for dysuria, flank pain, frequency, genital sores, hematuria, urgency, vaginal bleeding, vaginal discharge, vaginal pain, postcoital bleeding and vaginal odor.   Musculoskeletal:  Negative for back pain, joint swelling and myalgias.   Integumentary:  Negative for rash, acne, breast mass, nipple discharge and breast skin changes.   Neurological:  Negative for seizures, syncope, numbness and headaches.   Hematological:  Negative for adenopathy. Does not bruise/bleed easily.   Psychiatric/Behavioral:  Negative for depression and sleep disturbance. The patient is not nervous/anxious.    Breast: Negative for mass, mastodynia, nipple discharge and skin changes      Objective:    Vital Signs:  Vitals:    12/26/24 1449   BP: 112/70   Pulse: 80   Weight: 82.7 kg (182 lb 6.9 oz)   Height: 5' 4" (1.626 m)         Physical Exam:  General:  alert, cooperative, appears stated age   Skin:  Skin color, texture, turgor normal. No rashes or lesions   HEENT:  conjunctivae/corneas clear. PERRL.   Neck: supple, trachea midline, no adenopathy or thyromegally   Respiratory:  clear to auscultation bilaterally   Heart:  regular rate and rhythm, S1, S2 normal, no murmur, click, rub or gallop   Breasts:  no discharge, erythema, or tenderness   Abdomen:  normal findings: bowel sounds normal, no masses palpable, no organomegaly and soft, non-tender   Pelvis: External genitalia: normal general appearance  Urinary system: urethral meatus normal, bladder nontender  Vaginal: normal mucosa without prolapse or lesions pessary removed and cleaned; replaced  Cervix: normal appearance  Uterus: normal size, shape, position  Adnexa: non palpable   Extremities: Normal ROM; no edema, no cyanosis   Neurologial: Normal strength and tone. No focal numbness or weakness. Reflexes 2+ and equal.   Psychiatric: normal mood, speech, dress, and thought processes         Assessment:       Healthy female exam.     1. Well woman exam with routine gynecological " exam    2. Breast cancer screening by mammogram    3. Hormone replacement therapy (HRT)    4. Osteopenia determined by x-ray          Plan:       Pap smear deferred; no longer indicated  DEXA due next year  Continue Prolia  MMG pending  Continue Pessary     COUNSELING:  Evy was counseled on STD pevention, use and side-effects of various contraceptive measures, A.C.O.G. Pap guidelines and recommendations for yearly pelvic exams in addition to recommendations for monthly self breast exams; to see her PCP for other health maintenance.

## 2024-12-27 ENCOUNTER — TELEPHONE (OUTPATIENT)
Dept: OBSTETRICS AND GYNECOLOGY | Facility: CLINIC | Age: 79
End: 2024-12-27
Payer: MEDICARE

## 2024-12-27 NOTE — TELEPHONE ENCOUNTER
----- Message from Logan Tom sent at 12/27/2024  8:08 AM CST -----  He does not  ----- Message -----  From: Too Espinoza LPN  Sent: 12/26/2024   5:43 PM CST  To: [facility] Staff    Dr Del Rosario would like to know if Dr Cruz does DSMO Bladder Instillation for IC?

## 2025-01-16 ENCOUNTER — PROCEDURE VISIT (OUTPATIENT)
Dept: NEUROLOGY | Facility: CLINIC | Age: 80
End: 2025-01-16
Payer: MEDICARE

## 2025-01-16 DIAGNOSIS — G24.3 CERVICAL DYSTONIA: Primary | ICD-10-CM

## 2025-01-16 PROCEDURE — 64616 CHEMODENERV MUSC NECK DYSTON: CPT | Mod: 50,S$GLB,, | Performed by: PSYCHIATRY & NEUROLOGY

## 2025-01-16 NOTE — PROCEDURES
BOTOX PROCEDURE NOTE     Date of Procedure:1/16/2025    Reason for Procedure: Cervical dystonia       Informed consent was obtained prior to performing this study. Two patient identifiers were confirmed with the patient prior to performing this study. A time out to determine correct patient and and agreement on procedure performed was conducted prior to the injections.     Procedure Details: After informed consent obtained the patient's head and upper neck was cleansed with alcohol rub and 300 Units of Botox (diluted 1:1) was injected in the following bilateral muscles:   50 units in each Trapezius*  40 units in each Levator Scapulae*  20 units in each Splenius Capitus*  20 units in each Longissimus*  10 units in each Spenius Cervices*  10 units in each Semispinalis Capitus*    *= denotes bilateral injections    The patient tolerated the procedure well with less than 0.25cc of blood loss. She was observed for several minutes post injection and given a handout from [employer] regarding when and where to seek help if side effects are experienced.    RTC in 6 months for more Botox, but after 12 weeks if needed at any point  Botox  continues to greatly help her symptoms of neck pain and dystonia and she is doing well with twice yearly injections  She sees pain management for lumbar injection PRN-  she also uses meds  PRN with PCP instead  and had updated MRI L spine as requested by Dr Dejesus which was largely unchanged and he did not recommend surgery in 2018  -Suggested she see pain management back as needed noting she  SHIRLENE failed prior/ also  being treated with PCP  Trigeminal neuralgia pain improved as documented prior and PCP follows this and labs  MRI C spine 1/2018 showed mild spinal stenosis per report  Cautioned her re: Reglan Rx per PCP and the potential for worsening dystonia and other movement side effects. She has discontinued use since  Note she is being evaluated for dysphagia by other providers. She does  not notice this worsening with Botox. Thought to be due to a large hiatal hernia plus her neck disorder/ forward flexion. Currently, she feels great benefit with Botox and swallowing is improved.

## 2025-02-05 ENCOUNTER — TELEPHONE (OUTPATIENT)
Dept: UROLOGY | Facility: CLINIC | Age: 80
End: 2025-02-05
Payer: MEDICARE

## 2025-02-05 NOTE — TELEPHONE ENCOUNTER
----- Message from Med Assistant Tom sent at 2/3/2025 11:40 AM CST -----  Contact: PATIENT    ----- Message -----  From: Stephania Hyatt  Sent: 2/3/2025   8:15 AM CST  To: [facility] Staff    Evy Mendoza  MRN: [id number]  : 1945  PCP: Rakesh Devine  Home Phone      [phone]  Work Phone      Not on file.  Mobile          [phone]      MESSAGE: Patient would like to make an appointment for the DMSO treatment if the medication can be obtained.        Phone: [phone]

## 2025-02-05 NOTE — TELEPHONE ENCOUNTER
Returned call to patient and notified her that we have to get with the pharmacy to see if we'll be able to get this medication in stock. Notified her that I would give her a call back once we get a response. Patient v/u.

## 2025-02-06 DIAGNOSIS — M81.0 AGE-RELATED OSTEOPOROSIS WITHOUT CURRENT PATHOLOGICAL FRACTURE: Primary | ICD-10-CM

## 2025-02-11 ENCOUNTER — TELEPHONE (OUTPATIENT)
Dept: UROLOGY | Facility: CLINIC | Age: 80
End: 2025-02-11
Payer: MEDICARE

## 2025-02-11 NOTE — TELEPHONE ENCOUNTER
----- Message from Med Assistant Tom sent at 2025 11:27 AM CST -----  Contact: PATIENT    ----- Message -----  From: Stephania Hyatt  Sent: 2025   9:14 AM CST  To: [facility] Staff    Evy Mendoza  MRN: [id number]  : 1945  PCP: Rakesh Devine  Home Phone      [phone]  Work Phone      Not on file.  Mobile          [phone]      MESSAGE: Patient is following up on a message that she put in last week regarding the DMSO medication.          Phone: [phone]

## 2025-02-11 NOTE — TELEPHONE ENCOUNTER
Returned call to patient and notified her that we are trying to get everything situated on our end to be able to do the DMSO. Patient v/u.

## 2025-02-12 ENCOUNTER — INFUSION (OUTPATIENT)
Dept: INFUSION THERAPY | Facility: HOSPITAL | Age: 80
End: 2025-02-12
Attending: OBSTETRICS & GYNECOLOGY
Payer: MEDICARE

## 2025-02-12 VITALS
DIASTOLIC BLOOD PRESSURE: 70 MMHG | RESPIRATION RATE: 18 BRPM | SYSTOLIC BLOOD PRESSURE: 129 MMHG | TEMPERATURE: 98 F | HEART RATE: 74 BPM

## 2025-02-12 DIAGNOSIS — M81.0 AGE-RELATED OSTEOPOROSIS WITHOUT CURRENT PATHOLOGICAL FRACTURE: Primary | ICD-10-CM

## 2025-02-12 PROCEDURE — 96372 THER/PROPH/DIAG INJ SC/IM: CPT

## 2025-02-12 PROCEDURE — 63600175 PHARM REV CODE 636 W HCPCS: Mod: JZ,TB | Performed by: OBSTETRICS & GYNECOLOGY

## 2025-02-12 RX ADMIN — DENOSUMAB 60 MG: 60 INJECTION SUBCUTANEOUS at 01:02

## 2025-02-12 NOTE — PATIENT INSTRUCTIONS
Tolerated medication well. Instructed patient when to seek medical care, reviewed appointment times.  Discharged in stable condition.

## 2025-02-17 ENCOUNTER — TELEPHONE (OUTPATIENT)
Dept: GASTROENTEROLOGY | Facility: CLINIC | Age: 80
End: 2025-02-17
Payer: MEDICARE

## 2025-02-17 ENCOUNTER — HOSPITAL ENCOUNTER (OUTPATIENT)
Dept: RADIOLOGY | Facility: HOSPITAL | Age: 80
Discharge: HOME | End: 2025-02-17
Attending: NURSE PRACTITIONER
Payer: MEDICARE

## 2025-02-17 DIAGNOSIS — K80.50 CALCULUS OF BILE DUCT WITHOUT CHOLANGITIS OR CHOLECYSTITIS WITHOUT OBSTRUCTION: Primary | ICD-10-CM

## 2025-02-17 DIAGNOSIS — R10.11 RIGHT UPPER QUADRANT PAIN: ICD-10-CM

## 2025-02-17 PROCEDURE — 76705 ECHO EXAM OF ABDOMEN: CPT | Mod: TC

## 2025-02-17 PROCEDURE — 76705 ECHO EXAM OF ABDOMEN: CPT | Mod: 26,,, | Performed by: RADIOLOGY

## 2025-02-17 NOTE — TELEPHONE ENCOUNTER
----- Message from Freddie sent at 2/17/2025  3:07 PM CST -----  Good afternoon, The pt listed above is being referred from Lashae Murdock for (Calculus of bile duct without cholangitis or cholecystitis without obstruction. I have scanned the referral and records into . The referral is marked urgent.Appointments are not populating for any [facility] location. Please advise or contact pt to schedule appt at your earliest convenience. Thank You, Freddie Murphy

## 2025-02-19 ENCOUNTER — PATIENT MESSAGE (OUTPATIENT)
Dept: GASTROENTEROLOGY | Facility: CLINIC | Age: 80
End: 2025-02-19
Payer: MEDICARE

## 2025-07-17 ENCOUNTER — PROCEDURE VISIT (OUTPATIENT)
Dept: NEUROLOGY | Facility: CLINIC | Age: 80
End: 2025-07-17
Payer: MEDICARE

## 2025-07-17 DIAGNOSIS — G24.3 CERVICAL DYSTONIA: Primary | ICD-10-CM

## 2025-07-17 NOTE — PROCEDURES
BOTOX PROCEDURE NOTE     Date of Procedure:7/172025    Reason for Procedure: Cervical dystonia       Informed consent was obtained prior to performing this study. Two patient identifiers were confirmed with the patient prior to performing this study. A time out to determine correct patient and and agreement on procedure performed was conducted prior to the injections.     Procedure Details: After informed consent obtained the patient's head and upper neck was cleansed with alcohol rub and 300 Units of Botox (diluted 1:1) was injected in the following bilateral muscles:   50 units in each Trapezius* (over 5 sites)  40 units in each Levator Scapulae* (over 2 sites)  20 units in each Splenius Capitus*  20 units in each Longissimus*  10 units in each Spenius Cervices*  10 units in each Semispinalis Capitus*    *= denotes bilateral injections    The patient tolerated the procedure well with less than 0.25cc of blood loss. She was observed for several minutes post injection and given a handout from [employer] regarding when and where to seek help if side effects are experienced.    RTC in 6 months for more Botox, but after 12 weeks if needed at any point  Botox  continues to greatly help her symptoms of neck pain and dystonia and she is doing well with twice yearly injections  She sees pain management for lumbar injection PRN-  she also uses meds  PRN with PCP instead  and had updated MRI L spine as requested by Dr Dejesus which was largely unchanged and he did not recommend surgery in 2018  -Suggested she see pain management back as needed noting she  SHIRLENE failed prior/ also  being treated with PCP  Trigeminal neuralgia pain improved as documented prior and PCP follows this and labs  MRI C spine 1/2018 showed mild spinal stenosis per report  Cautioned her re: Reglan Rx per PCP and the potential for worsening dystonia and other movement side effects. She has discontinued use since  Note was = evaluated for dysphagia by other  providers prior. She does not notice this worsening with Botox. Thought to be due to a large hiatal hernia plus her neck disorder/ forward flexion. Currently, she feels great benefit with Botox and swallowing is improved.

## 2025-08-14 ENCOUNTER — OFFICE VISIT (OUTPATIENT)
Dept: OBSTETRICS AND GYNECOLOGY | Facility: CLINIC | Age: 80
End: 2025-08-14
Payer: MEDICARE

## 2025-08-14 ENCOUNTER — TELEPHONE (OUTPATIENT)
Dept: OBSTETRICS AND GYNECOLOGY | Facility: CLINIC | Age: 80
End: 2025-08-14
Payer: MEDICARE

## 2025-08-14 VITALS
HEIGHT: 64 IN | BODY MASS INDEX: 32.37 KG/M2 | WEIGHT: 189.63 LBS | HEART RATE: 74 BPM | DIASTOLIC BLOOD PRESSURE: 80 MMHG | SYSTOLIC BLOOD PRESSURE: 130 MMHG

## 2025-08-14 DIAGNOSIS — N95.0 PMB (POSTMENOPAUSAL BLEEDING): Primary | ICD-10-CM

## 2025-08-14 PROCEDURE — 99999 PR PBB SHADOW E&M-EST. PATIENT-LVL IV: CPT | Mod: PBBFAC,,, | Performed by: OBSTETRICS & GYNECOLOGY

## 2025-08-14 PROCEDURE — 3075F SYST BP GE 130 - 139MM HG: CPT | Mod: CPTII,S$GLB,, | Performed by: OBSTETRICS & GYNECOLOGY

## 2025-08-14 PROCEDURE — 1159F MED LIST DOCD IN RCRD: CPT | Mod: CPTII,S$GLB,, | Performed by: OBSTETRICS & GYNECOLOGY

## 2025-08-14 PROCEDURE — 1101F PT FALLS ASSESS-DOCD LE1/YR: CPT | Mod: CPTII,S$GLB,, | Performed by: OBSTETRICS & GYNECOLOGY

## 2025-08-14 PROCEDURE — 99213 OFFICE O/P EST LOW 20 MIN: CPT | Mod: S$GLB,,, | Performed by: OBSTETRICS & GYNECOLOGY

## 2025-08-14 PROCEDURE — 3079F DIAST BP 80-89 MM HG: CPT | Mod: CPTII,S$GLB,, | Performed by: OBSTETRICS & GYNECOLOGY

## 2025-08-14 PROCEDURE — 3288F FALL RISK ASSESSMENT DOCD: CPT | Mod: CPTII,S$GLB,, | Performed by: OBSTETRICS & GYNECOLOGY

## 2025-08-18 ENCOUNTER — TELEPHONE (OUTPATIENT)
Dept: OBSTETRICS AND GYNECOLOGY | Facility: CLINIC | Age: 80
End: 2025-08-18
Payer: MEDICARE

## 2025-08-20 ENCOUNTER — TELEPHONE (OUTPATIENT)
Dept: OBSTETRICS AND GYNECOLOGY | Facility: CLINIC | Age: 80
End: 2025-08-20
Payer: MEDICARE

## 2025-08-20 ENCOUNTER — INFUSION (OUTPATIENT)
Dept: INFUSION THERAPY | Facility: HOSPITAL | Age: 80
End: 2025-08-20
Attending: OBSTETRICS & GYNECOLOGY
Payer: MEDICARE

## 2025-08-20 VITALS
RESPIRATION RATE: 18 BRPM | HEART RATE: 72 BPM | SYSTOLIC BLOOD PRESSURE: 138 MMHG | DIASTOLIC BLOOD PRESSURE: 87 MMHG | TEMPERATURE: 98 F

## 2025-08-20 DIAGNOSIS — M81.0 AGE-RELATED OSTEOPOROSIS WITHOUT CURRENT PATHOLOGICAL FRACTURE: Primary | ICD-10-CM

## 2025-08-20 PROCEDURE — 96372 THER/PROPH/DIAG INJ SC/IM: CPT

## 2025-08-20 PROCEDURE — 63600175 PHARM REV CODE 636 W HCPCS: Mod: JZ,TB | Performed by: OBSTETRICS & GYNECOLOGY

## 2025-08-20 RX ADMIN — DENOSUMAB 60 MG: 60 INJECTION SUBCUTANEOUS at 02:08

## (undated) DEVICE — SEE MEDLINE ITEM 146298

## (undated) DEVICE — BOWL CEMENT

## (undated) DEVICE — UNDERGLOVES BIOGEL PI SIZE 7.5

## (undated) DEVICE — BLADE SAG 18.0X1.27X100

## (undated) DEVICE — KIT TOTAL KNEE TKOFG

## (undated) DEVICE — ADHESIVE DERMABOND ADVANCED

## (undated) DEVICE — SEE MEDLINE ITEM 157117

## (undated) DEVICE — TOURNIQUET SB QC DP 34X4IN

## (undated) DEVICE — SEE MEDLINE ITEM 157131

## (undated) DEVICE — KIT IRR SUCTION HND PIECE

## (undated) DEVICE — PUMP COLD THERAPY

## (undated) DEVICE — SUT VICRYL BR 1 GEN 27 CT-1

## (undated) DEVICE — BLADE SURG CARBON STEEL #10

## (undated) DEVICE — SYR 30CC LUER LOCK

## (undated) DEVICE — GLOVE BIOGEL SKINSENSE PI 7.5

## (undated) DEVICE — DRAPE STERI INSTRUMENT 1018

## (undated) DEVICE — SEE MEDLINE ITEM 152487

## (undated) DEVICE — BIT DRILL PIN TROCAR 3.2MM
Type: IMPLANTABLE DEVICE | Site: KNEE | Status: NON-FUNCTIONAL
Removed: 2019-12-09

## (undated) DEVICE — Device

## (undated) DEVICE — BRUSH SURGICAL SCRUB STERILE

## (undated) DEVICE — SUT MONOCRYL 3-0 PS-2 UND

## (undated) DEVICE — DRESSING AQUACEL ADH 4X10IN

## (undated) DEVICE — TAPE SURG DURAPORE 2 X10YD

## (undated) DEVICE — NDL 18GA X1 1/2 REG BEVEL

## (undated) DEVICE — SYR ONLY LUER LOCK 20CC

## (undated) DEVICE — SOL IRR NACL .9% 3000ML

## (undated) DEVICE — BLADE SAG 13.0 X1.27X90

## (undated) DEVICE — BANDAGE ESMARK 6X12

## (undated) DEVICE — SCREW HEX HEAD
Type: IMPLANTABLE DEVICE | Site: KNEE | Status: NON-FUNCTIONAL
Removed: 2019-12-09

## (undated) DEVICE — BLADE RECIP RIBBED

## (undated) DEVICE — SET CEMENT (SCULP)

## (undated) DEVICE — PAD COLD THERAPY KNEE WRAP ON

## (undated) DEVICE — SEE MEDLINE ITEM 154981

## (undated) DEVICE — SUT CTD VICRYL 0 UND BR CPX

## (undated) DEVICE — SUT VICRYL 3-0 27 SH

## (undated) DEVICE — ELECTRODE REM PLYHSV RETURN 9

## (undated) DEVICE — SCREW HEX HEAD 3.5X38MM
Type: IMPLANTABLE DEVICE | Site: KNEE | Status: NON-FUNCTIONAL
Removed: 2019-12-09